# Patient Record
Sex: MALE | Race: WHITE | NOT HISPANIC OR LATINO | Employment: FULL TIME | URBAN - METROPOLITAN AREA
[De-identification: names, ages, dates, MRNs, and addresses within clinical notes are randomized per-mention and may not be internally consistent; named-entity substitution may affect disease eponyms.]

---

## 2017-05-27 DIAGNOSIS — E78.5 HYPERLIPIDEMIA LDL GOAL <160: ICD-10-CM

## 2017-05-30 RX ORDER — ATORVASTATIN CALCIUM 10 MG/1
TABLET, FILM COATED ORAL
Refills: 0
Start: 2017-05-30

## 2017-05-30 NOTE — TELEPHONE ENCOUNTER
Pending Prescriptions:                       Disp   Refills    atorvastatin (LIPITOR) 10 MG tablet [Pharm*90 tab*0        Sig: TAKE 1 TABLET BY MOUTH DAILY - NEED APPT           Last Written Prescription Date: 11/10/2016  Last Fill Quantity: 90, # refills: 3  Last Office Visit with [facility], [facility] or [facility] prescribing provider: 11/10/2016       Lab Results   Component Value Date    CHOL 182 06/17/2016     Lab Results   Component Value Date    HDL 29 06/17/2016     Lab Results   Component Value Date     06/17/2016     Lab Results   Component Value Date    TRIG 171 06/17/2016     Lab Results   Component Value Date    CHOLHDLRATIO 7.3 09/21/2015

## 2017-07-18 DIAGNOSIS — Z11.59 NEED FOR HEPATITIS C SCREENING TEST: ICD-10-CM

## 2017-07-18 DIAGNOSIS — G47.33 OBSTRUCTIVE SLEEP APNEA: ICD-10-CM

## 2017-07-18 DIAGNOSIS — E78.5 HYPERLIPIDEMIA LDL GOAL <160: Primary | ICD-10-CM

## 2017-07-18 DIAGNOSIS — Z12.5 SCREENING FOR PROSTATE CANCER: ICD-10-CM

## 2017-07-18 PROCEDURE — 86803 HEPATITIS C AB TEST: CPT | Performed by: FAMILY MEDICINE

## 2017-07-18 PROCEDURE — 36415 COLL VENOUS BLD VENIPUNCTURE: CPT | Performed by: FAMILY MEDICINE

## 2017-07-18 PROCEDURE — 80053 COMPREHEN METABOLIC PANEL: CPT | Performed by: FAMILY MEDICINE

## 2017-07-18 PROCEDURE — 85027 COMPLETE CBC AUTOMATED: CPT | Performed by: FAMILY MEDICINE

## 2017-07-18 PROCEDURE — 80061 LIPID PANEL: CPT | Performed by: FAMILY MEDICINE

## 2017-07-18 PROCEDURE — 84153 ASSAY OF PSA TOTAL: CPT | Performed by: FAMILY MEDICINE

## 2017-07-18 NOTE — LETTER
[place] [address] [facility]  [address]  [place]  [address]  Tel: [phone]    July 20, 2017    Syed GALICIA Xander  [address]  [place] [place] [facility] [address]        Dear Ronit Xander,    I wanted to get back to you with your test results.  I have enclosed a copy for your review.       I am happy to report that your CBC or complete blood count is normal with no signs of anemia, leukemia or platelet abnormalities. Your chemistry panel shows no signs of diabetes.  Your blood salts, kidney tests, liver tests, and proteins are all fine.     Your total cholesterol is 186 with the normal range being below 200.  Your HDL or good cholesterol is 37 with the normal range being above 40.  Your LDL or bad cholesterol is 122 with the normal range being below 130.       Your PSA (prostate cancer screening test) was normal.     Your screening test for hepatitis C was negative.     Juhi HUTCHINSON-C /NB    Results for orders placed or performed in visit on 07/18/17   **Hepatitis C Screen Reflex to RNA FUTURE anytime   Result Value Ref Range    Hepatitis C Antibody  NR     Nonreactive   Assay performance characteristics have not been established for newborns,   infants, and children     Comprehensive metabolic panel (BMP + Alb, Alk Phos, ALT, AST, Total. Bili, TP)   Result Value Ref Range    Sodium 140 133 - 144 mmol/L    Potassium 4.3 3.4 - 5.3 mmol/L    Chloride 105 94 - 109 mmol/L    Carbon Dioxide 30 20 - 32 mmol/L    Anion Gap 5 3 - 14 mmol/L    Glucose 90 70 - 99 mg/dL    Urea Nitrogen 19 7 - 30 mg/dL    Creatinine 1.17 0.66 - 1.25 mg/dL    GFR Estimate 65 >60 mL/min/1.7m2    GFR Estimate If Black 79 >60 mL/min/1.7m2    Calcium 9.0 8.5 - 10.1 mg/dL    Bilirubin Total 0.9 0.2 - 1.3 mg/dL    Albumin 4.0 3.4 - 5.0 g/dL    Protein Total 7.2 6.8 - 8.8 g/dL    Alkaline Phosphatase 71 40 - 150 U/L    ALT 46 0 - 70 U/L    AST 15 0 - 45 U/L   Lipid Profile (Chol, Trig, HDL, LDL calc)   Result Value Ref Range    Cholesterol  186 <200 mg/dL    Triglycerides 134 <150 mg/dL    HDL Cholesterol 37 (L) >39 mg/dL    LDL Cholesterol Calculated 122 (H) <100 mg/dL    Non HDL Cholesterol 149 (H) <130 mg/dL   PSA, tumor marker   Result Value Ref Range    PSA 0.85 0 - 4 ug/L   CBC with platelets   Result Value Ref Range    WBC 5.4 4.0 - 11.0 10e9/L    RBC Count 4.61 4.4 - 5.9 10e12/L    Hemoglobin 14.7 13.3 - 17.7 g/dL    Hematocrit 42.1 40.0 - 53.0 %    MCV 91 78 - 100 fl    MCH 31.9 26.5 - 33.0 pg    MCHC 34.9 31.5 - 36.5 g/dL    RDW 12.4 10.0 - 15.0 %    Platelet Count 235 150 - 450 10e9/L

## 2017-07-19 LAB
ALBUMIN SERPL-MCNC: 4 G/DL (ref 3.4–5)
ALP SERPL-CCNC: 71 U/L (ref 40–150)
ALT SERPL W P-5'-P-CCNC: 46 U/L (ref 0–70)
ANION GAP SERPL CALCULATED.3IONS-SCNC: 5 MMOL/L (ref 3–14)
AST SERPL W P-5'-P-CCNC: 15 U/L (ref 0–45)
BILIRUB SERPL-MCNC: 0.9 MG/DL (ref 0.2–1.3)
BUN SERPL-MCNC: 19 MG/DL (ref 7–30)
CALCIUM SERPL-MCNC: 9 MG/DL (ref 8.5–10.1)
CHLORIDE SERPL-SCNC: 105 MMOL/L (ref 94–109)
CHOLEST SERPL-MCNC: 186 MG/DL
CO2 SERPL-SCNC: 30 MMOL/L (ref 20–32)
CREAT SERPL-MCNC: 1.17 MG/DL (ref 0.66–1.25)
ERYTHROCYTE [DISTWIDTH] IN BLOOD BY AUTOMATED COUNT: 12.4 % (ref 10–15)
GFR SERPL CREATININE-BSD FRML MDRD: 65 ML/MIN/1.7M2
GLUCOSE SERPL-MCNC: 90 MG/DL (ref 70–99)
HCT VFR BLD AUTO: 42.1 % (ref 40–53)
HDLC SERPL-MCNC: 37 MG/DL
HGB BLD-MCNC: 14.7 G/DL (ref 13.3–17.7)
LDLC SERPL CALC-MCNC: 122 MG/DL
MCH RBC QN AUTO: 31.9 PG (ref 26.5–33)
MCHC RBC AUTO-ENTMCNC: 34.9 G/DL (ref 31.5–36.5)
MCV RBC AUTO: 91 FL (ref 78–100)
NONHDLC SERPL-MCNC: 149 MG/DL
PLATELET # BLD AUTO: 235 10E9/L (ref 150–450)
POTASSIUM SERPL-SCNC: 4.3 MMOL/L (ref 3.4–5.3)
PROT SERPL-MCNC: 7.2 G/DL (ref 6.8–8.8)
PSA SERPL-MCNC: 0.85 UG/L (ref 0–4)
RBC # BLD AUTO: 4.61 10E12/L (ref 4.4–5.9)
SODIUM SERPL-SCNC: 140 MMOL/L (ref 133–144)
TRIGL SERPL-MCNC: 134 MG/DL
WBC # BLD AUTO: 5.4 10E9/L (ref 4–11)

## 2017-07-20 LAB — HCV AB SERPL QL IA: NORMAL

## 2017-07-20 NOTE — PROGRESS NOTES
I wanted to get back to you with your test results.  I have enclosed a copy for your review.      I am happy to report that your CBC or complete blood count is normal with no signs of anemia, leukemia or platelet abnormalities. Your chemistry panel shows no signs of diabetes.  Your blood salts, kidney tests, liver tests, and proteins are all fine.    Your total cholesterol is 186 with the normal range being below 200.  Your HDL or good cholesterol is 37 with the normal range being above 40.  Your LDL or bad cholesterol is 122 with the normal range being below 130.      Your PSA (prostate cancer screening test) was normal.    Your screening test for hepatitis C was negative.    Juhi Skelton PA-C

## 2017-09-07 DIAGNOSIS — F33.1 MAJOR DEPRESSIVE DISORDER, RECURRENT EPISODE, MODERATE (H): ICD-10-CM

## 2017-09-08 RX ORDER — BUPROPION HYDROCHLORIDE 300 MG/1
TABLET ORAL
Qty: 90 TABLET | Refills: 0 | Status: SHIPPED | OUTPATIENT
Start: 2017-09-08 | End: 2018-01-04

## 2017-09-08 NOTE — TELEPHONE ENCOUNTER
Prescription approved per [facility] Refill Protocol.  Emely Cano RN

## 2017-09-08 NOTE — TELEPHONE ENCOUNTER
Pending Prescriptions:                       Disp   Refills    buPROPion (WELLBUTRIN XL) 300 MG 24 hr ta*90 tab*1            Sig: TAKE 1 TABLET (300 MG) BY MOUTH EVERY MORNING           Last Written Prescription Date: 11/10/16  Last Fill Quantity: 90; # refills: 1  Last Office Visit with [facility], [facility] or [facility] prescribing provider:  11/10/16 Nafisa        Last PHQ-9 score on record=   PHQ-9 SCORE 11/10/2016   Total Score -   Total Score 3       Lab Results   Component Value Date    AST 15 07/18/2017     Lab Results   Component Value Date    ALT 46 07/18/2017     Anabell Denney, RT(R)

## 2017-12-14 DIAGNOSIS — F33.1 MAJOR DEPRESSIVE DISORDER, RECURRENT EPISODE, MODERATE (H): ICD-10-CM

## 2017-12-18 RX ORDER — BUPROPION HYDROCHLORIDE 300 MG/1
TABLET ORAL
Qty: 30 TABLET | Refills: 0 | Status: SHIPPED | OUTPATIENT
Start: 2017-12-18 | End: 2018-01-04

## 2017-12-18 NOTE — TELEPHONE ENCOUNTER
Medication is being filled for 1 time refill only due to:  Patient needs to be seen because it has been more than one year since last visit.   Sadia POP RN    Requested Prescriptions   Pending Prescriptions Disp Refills     buPROPion (WELLBUTRIN XL) 300 MG 24 hr tablet [Pharmacy Med Name: BUPROPION HCL  MG TABLET] 90 tablet 1     Sig: TAKE 1 TABLET (300 MG) BY MOUTH EVERY MORNING    SSRIs Protocol Failed    12/14/2017  5:37 PM       Failed - PHQ-9 score less than 5 in past 6 months    Please review PHQ-9 score.          Failed - Medication is NOT Bupropion    If the medication is Bupropion (Wellbutrin), and the patient is taking for smoking cessation; OK to refill.         Failed - Recent (6 mo) or future visit with authorizing provider's specialty    Patient had office visit in the last 6 months or has a visit in the next 30 days with authorizing provider.  See chart review.            Passed - Patient is age 18 or older

## 2018-01-04 ENCOUNTER — OFFICE VISIT (OUTPATIENT)
Dept: FAMILY MEDICINE | Facility: CLINIC | Age: 53
End: 2018-01-04
Payer: COMMERCIAL

## 2018-01-04 VITALS
WEIGHT: 237 LBS | HEART RATE: 56 BPM | OXYGEN SATURATION: 96 % | RESPIRATION RATE: 16 BRPM | HEIGHT: 73 IN | TEMPERATURE: 97.6 F | DIASTOLIC BLOOD PRESSURE: 82 MMHG | BODY MASS INDEX: 31.41 KG/M2 | SYSTOLIC BLOOD PRESSURE: 132 MMHG

## 2018-01-04 DIAGNOSIS — F33.1 MAJOR DEPRESSIVE DISORDER, RECURRENT EPISODE, MODERATE (H): ICD-10-CM

## 2018-01-04 DIAGNOSIS — E78.5 HYPERLIPIDEMIA LDL GOAL <160: ICD-10-CM

## 2018-01-04 DIAGNOSIS — J30.0 CHRONIC VASOMOTOR RHINITIS: ICD-10-CM

## 2018-01-04 DIAGNOSIS — G47.33 OSA ON CPAP: ICD-10-CM

## 2018-01-04 DIAGNOSIS — Z00.00 ENCOUNTER FOR ROUTINE ADULT HEALTH EXAMINATION WITHOUT ABNORMAL FINDINGS: Primary | ICD-10-CM

## 2018-01-04 PROCEDURE — 80053 COMPREHEN METABOLIC PANEL: CPT | Performed by: PHYSICIAN ASSISTANT

## 2018-01-04 PROCEDURE — 99396 PREV VISIT EST AGE 40-64: CPT | Performed by: PHYSICIAN ASSISTANT

## 2018-01-04 PROCEDURE — 80061 LIPID PANEL: CPT | Performed by: PHYSICIAN ASSISTANT

## 2018-01-04 PROCEDURE — 36415 COLL VENOUS BLD VENIPUNCTURE: CPT | Performed by: PHYSICIAN ASSISTANT

## 2018-01-04 RX ORDER — IPRATROPIUM BROMIDE 21 UG/1
2 SPRAY, METERED NASAL EVERY 12 HOURS
Qty: 1 BOX | Refills: 11 | Status: SHIPPED | OUTPATIENT
Start: 2018-01-04 | End: 2019-02-05

## 2018-01-04 RX ORDER — BUPROPION HYDROCHLORIDE 300 MG/1
TABLET ORAL
Qty: 90 TABLET | Refills: 3 | Status: SHIPPED | OUTPATIENT
Start: 2018-01-04 | End: 2019-01-28

## 2018-01-04 RX ORDER — ATORVASTATIN CALCIUM 10 MG/1
TABLET, FILM COATED ORAL
Qty: 90 TABLET | Refills: 3 | Status: SHIPPED | OUTPATIENT
Start: 2018-01-04 | End: 2019-01-28

## 2018-01-04 ASSESSMENT — PATIENT HEALTH QUESTIONNAIRE - PHQ9: SUM OF ALL RESPONSES TO PHQ QUESTIONS 1-9: 4

## 2018-01-04 NOTE — PATIENT INSTRUCTIONS
Preventive Health Recommendations  Male Ages 50 - 64    Yearly exam:             See your health care provider every year in order to  o   Review health changes.   o   Discuss preventive care.    o   Review your medicines if your doctor has prescribed any.     Have a cholesterol test every 5 years, or more frequently if you are at risk for high cholesterol/heart disease.     Have a diabetes test (fasting glucose) every three years. If you are at risk for diabetes, you should have this test more often.     Have a colonoscopy at age 50, or have a yearly FIT test (stool test). These exams will check for colon cancer.      Talk with your health care provider about whether or not a prostate cancer screening test (PSA) is right for you.    You should be tested each year for STDs (sexually transmitted diseases), if you re at risk.     Shots: Get a flu shot each year. Get a tetanus shot every 10 years.     Nutrition:    Eat at least 5 servings of fruits and vegetables daily.     Eat whole-grain bread, whole-wheat pasta and brown rice instead of white grains and rice.     Talk to your provider about Calcium and Vitamin D.     Lifestyle    Exercise for at least 150 minutes a week (30 minutes a day, 5 days a week). This will help you control your weight and prevent disease.     Limit alcohol to one drink per day.     No smoking.     Wear sunscreen to prevent skin cancer.     See your dentist every six months for an exam and cleaning.     See your eye doctor every 1 to 2 years.      For the runny nose:   Your could try an allergy pill such as Allegra 180mg or claritin 10mg or Zyrtec 10mg  If that doesn't work, try the prescription nasal spray Atrovent  Flonase and Nasacort are steroid nasal sprays you can buy over the counter    Start the lamisil for the toenail

## 2018-01-04 NOTE — MR AVS SNAPSHOT
After Visit Summary   1/4/2018    Syed Terrell    MRN: [id number]           Patient Information     Date Of Birth          1965        Visit Information        Provider Department      1/4/2018 9:30 AM Juhi Skelton PA-C [facility]        Today's Diagnoses     Encounter for routine adult health examination without abnormal findings    -  1    Major depressive disorder, recurrent episode, moderate (H)        Chronic vasomotor rhinitis        Hyperlipidemia LDL goal <160        VANESSA on CPAP          Care Instructions      Preventive Health Recommendations  Male Ages 50 - 64    Yearly exam:             See your health care provider every year in order to  o   Review health changes.   o   Discuss preventive care.    o   Review your medicines if your doctor has prescribed any.     Have a cholesterol test every 5 years, or more frequently if you are at risk for high cholesterol/heart disease.     Have a diabetes test (fasting glucose) every three years. If you are at risk for diabetes, you should have this test more often.     Have a colonoscopy at age 50, or have a yearly FIT test (stool test). These exams will check for colon cancer.      Talk with your health care provider about whether or not a prostate cancer screening test (PSA) is right for you.    You should be tested each year for STDs (sexually transmitted diseases), if you re at risk.     Shots: Get a flu shot each year. Get a tetanus shot every 10 years.     Nutrition:    Eat at least 5 servings of fruits and vegetables daily.     Eat whole-grain bread, whole-wheat pasta and brown rice instead of white grains and rice.     Talk to your provider about Calcium and Vitamin D.     Lifestyle    Exercise for at least 150 minutes a week (30 minutes a day, 5 days a week). This will help you control your weight and prevent disease.     Limit alcohol to one drink per day.     No smoking.     Wear sunscreen to prevent skin cancer.  "    See your dentist every six months for an exam and cleaning.     See your eye doctor every 1 to 2 years.      For the runny nose:   Your could try an allergy pill such as Allegra 180mg or claritin 10mg or Zyrtec 10mg  If that doesn't work, try the prescription nasal spray Atrovent  Flonase and Nasacort are steroid nasal sprays you can buy over the counter    Start the lamisil for the toenail              Follow-ups after your visit        Who to contact     If you have questions or need follow up information about today's clinic visit or your schedule please contact [facility] directly at [phone].  Normal or non-critical lab and imaging results will be communicated to you by MyChart, letter or phone within 4 business days after the clinic has received the results. If you do not hear from us within 7 days, please contact the clinic through MyChart or phone. If you have a critical or abnormal lab result, we will notify you by phone as soon as possible.  Submit refill requests through [employer] or call your pharmacy and they will forward the refill request to us. Please allow 3 business days for your refill to be completed.          Additional Information About Your Visit        MyChart Information     [employer] lets you send messages to your doctor, view your test results, renew your prescriptions, schedule appointments and more. To sign up, go to [email or web address] . Click on \"Log in\" on the left side of the screen, which will take you to the Welcome page. Then click on \"Sign up Now\" on the right side of the page.     You will be asked to enter the access code listed below, as well as some personal information. Please follow the directions to create your username and password.     Your access code is: [email or web address]  Expires: 2018  9:33 AM     Your access code will  in 90 days. If you need help or a new code, please call your [facility] clinic or [phone].        Care EveryWhere ID     " "This is your Care EveryWhere ID. This could be used by other organizations to access your [facility] medical records  [id number]        Your Vitals Were     Pulse Temperature Respirations Height Pulse Oximetry BMI (Body Mass Index)    56 97.6  F (36.4  C) (Oral) 16 6' 1\" (1.854 m) 96% 31.27 kg/m2       Blood Pressure from Last 3 Encounters:   01/04/18 132/82   11/10/16 118/70   12/17/15 136/80    Weight from Last 3 Encounters:   01/04/18 237 lb (107.5 kg)   11/10/16 240 lb 12.8 oz (109.2 kg)   12/17/15 232 lb 12.8 oz (105.6 kg)              We Performed the Following     Comprehensive metabolic panel     Lipid Profile          Today's Medication Changes          These changes are accurate as of: 1/4/18  9:33 AM.  If you have any questions, ask your nurse or doctor.               Start taking these medicines.        Dose/Directions    ipratropium 0.03 % spray   Commonly known as:  ATROVENT   Used for:  Chronic vasomotor rhinitis   Started by:  Juhi Skelton PA-C        Dose:  2 spray   Spray 2 sprays into both nostrils every 12 hours   Quantity:  1 Box   Refills:  11         These medicines have changed or have updated prescriptions.        Dose/Directions    buPROPion 300 MG 24 hr tablet   Commonly known as:  WELLBUTRIN XL   This may have changed:  See the new instructions.   Used for:  Major depressive disorder, recurrent episode, moderate (H)   Changed by:  Juhi Skelton PA-C        TAKE 1 TABLET (300 MG) BY MOUTH EVERY MORNING   Quantity:  90 tablet   Refills:  3         Stop taking these medicines if you haven't already. Please contact your care team if you have questions.     terbinafine 250 MG tablet   Commonly known as:  lamISIL   Stopped by:  Juhi Skelton PA-C           triamcinolone 0.1 % cream   Commonly known as:  KENALOG   Stopped by:  Juhi Skelton PA-C                Where to get your medicines      These medications were sent to [facility]/pharmacy #[id number] - [place], [place] - [address] [place] AT [facility]  [address] [place] [address] [address] [facility] [address]     Phone:  [phone]     atorvastatin 10 MG tablet    buPROPion 300 MG 24 hr tablet    ipratropium 0.03 % spray                Primary Care Provider Office Phone # Fax #    Juhi Skelton PA-C [phone] [address] [facility] [address]  [facility] [address]        Equal Access to Services     ROSENDA SCALES : Hadii aad ku hadasho Soomaali, waaxda luqadaha, qaybta kaalmada adeegyada, waxay idiin hayaan adeeg kharash la'jairo . So [facility] [phone].    ATENCIÓN: Si habla español, tiene a hardin disposición servicios gratuitos de asistencia lingüística. [facility] [phone].    We comply with applicable federal civil rights laws and [place] laws. We do not discriminate on the basis of race, color, national origin, age, disability, sex, sexual orientation, or gender identity.            Thank you!     Thank you for choosing [facility]  for your care. Our goal is always to provide you with excellent care. Hearing back from our patients is one way we can continue to improve our services. Please take a few minutes to complete the written survey that you may receive in the mail after your visit with us. Thank you!             Your Updated Medication List - Protect others around you: Learn how to safely use, store and throw away your medicines at www.disposemymeds.org.          This list is accurate as of: 1/4/18  9:33 AM.  Always use your most recent med list.                   Brand Name Dispense Instructions for use Diagnosis    atorvastatin 10 MG tablet    LIPITOR    90 tablet    TAKE 1 TABLET BY MOUTH DAILY    Hyperlipidemia LDL goal <160       buPROPion 300 MG 24 hr tablet    WELLBUTRIN XL    90 tablet    TAKE 1 TABLET (300 MG) BY MOUTH EVERY MORNING    Major depressive disorder, recurrent episode, moderate (H)       ipratropium 0.03 % spray    ATROVENT    1 Box    Spray 2 sprays into both nostrils every 12  hours    Chronic vasomotor rhinitis       vitamin D 2000 UNITS Caps      Take  by mouth.

## 2018-01-04 NOTE — NURSING NOTE
"Chief Complaint   Patient presents with     Physical     pt is fasting.        Initial /82  Pulse 56  Temp 97.6  F (36.4  C) (Oral)  Resp 16  Ht 6' 1\" (1.854 m)  Wt 237 lb (107.5 kg)  SpO2 96%  BMI 31.27 kg/m2 Estimated body mass index is 31.27 kg/(m^2) as calculated from the following:    Height as of this encounter: 6' 1\" (1.854 m).    Weight as of this encounter: 237 lb (107.5 kg).  Medication Reconciliation: complete  "

## 2018-01-04 NOTE — PROGRESS NOTES
SUBJECTIVE:   CC: Syed Terrell is an 52 year old male who presents for preventative health visit.     He has a constant runny nose and some post nasal drip for a number of weeks  Had some sneezing at times    He was off of wellbutrin for a few weeks and felt run down so back on this.   Has helped his mood    He never used his lamisil for toenail fungus and now this nail is thickened and painful    Wife Lb had gastric sleeve surgery and has been losing wt and exercising.  He has not been exercising.      Healthy Habits:    Do you get at least three servings of calcium containing foods daily (dairy, green leafy vegetables, etc.)? yes    Amount of exercise or daily activities, outside of work: 0 day(s) per week    Problems taking medications regularly No    Medication side effects: No    Have you had an eye exam in the past two years? yes    Do you see a dentist twice per year? yes    Do you have sleep apnea, excessive snoring or daytime drowsiness?yes, has cpap and also has daytime drowsiness         Toe fungus problem on left foot. Painful  Allergies? Sinus drainage    Today's PHQ-2 Score:   PHQ-2 ( 1999 Pfizer) 1/4/2018 11/10/2016   Q1: Little interest or pleasure in doing things 0 0   Q2: Feeling down, depressed or hopeless 0 0   PHQ-2 Score 0 0       Abuse: Current or Past(Physical, Sexual or Emotional)- No  Do you feel safe in your environment - Yes    Social History   Substance Use Topics     Smoking status: Never Smoker     Smokeless tobacco: Never Used     Alcohol use 0.0 - 0.6 oz/week     0 - 1 Standard drinks or equivalent per week      Comment: 0-1 drinks per week      If you drink alcohol do you typically have >3 drinks per day or >7 drinks per week? No                      Last PSA:   PSA   Date Value Ref Range Status   07/18/2017 0.85 0 - 4 ug/L Final     Comment:     Assay Method:  Chemiluminescence using Siemens Vista analyzer     Past Medical History:   Diagnosis Date     Colon polyp 2010     "Repeat colonoscopy 2015     FH: colon cancer      Hyperlipidemia LDL goal <160      Moderate major depression (H) 6/12/2013     VANESSA on CPAP     could not tolerate CPAP     History reviewed. No pertinent surgical history.  Current Outpatient Prescriptions   Medication Sig Dispense Refill     buPROPion (WELLBUTRIN XL) 300 MG 24 hr tablet TAKE 1 TABLET (300 MG) BY MOUTH EVERY MORNING 90 tablet 3     ipratropium (ATROVENT) 0.03 % spray Spray 2 sprays into both nostrils every 12 hours 1 Box 11     atorvastatin (LIPITOR) 10 MG tablet TAKE 1 TABLET BY MOUTH DAILY 90 tablet 3     Cholecalciferol (VITAMIN D) 2000 UNITS CAPS Take  by mouth.       [DISCONTINUED] buPROPion (WELLBUTRIN XL) 300 MG 24 hr tablet TAKE 1 TABLET (300 MG) BY MOUTH EVERY MORNING 30 tablet 0     [DISCONTINUED] buPROPion (WELLBUTRIN XL) 300 MG 24 hr tablet TAKE 1 TABLET (300 MG) BY MOUTH EVERY MORNING 90 tablet 0     [DISCONTINUED] atorvastatin (LIPITOR) 10 MG tablet TAKE 1 TABLET BY MOUTH DAILY 90 tablet 3       ROS:  C: NEGATIVE for fever, chills, change in weight  I: NEGATIVE for worrisome rashes, moles or lesions  E: NEGATIVE for vision changes or irritation  ENT: NEGATIVE for ear, mouth and throat problems  R: NEGATIVE for significant cough or SOB  CV: NEGATIVE for chest pain, palpitations or peripheral edema  GI: NEGATIVE for nausea, abdominal pain, heartburn, or change in bowel habits   male: negative for dysuria, hematuria, decreased urinary stream, erectile dysfunction, urethral discharge  M: NEGATIVE for significant arthralgias or myalgia  N: NEGATIVE for weakness, dizziness or paresthesias  P: NEGATIVE for changes in mood or affect    OBJECTIVE:   /82  Pulse 56  Temp 97.6  F (36.4  C) (Oral)  Resp 16  Ht 6' 1\" (1.854 m)  Wt 237 lb (107.5 kg)  SpO2 96%  BMI 31.27 kg/m2  EXAM:  GENERAL: healthy, alert and no distress  EYES: Eyes grossly normal to inspection, PERRL and conjunctivae and sclerae normal  HENT: ear canals and TM's " "normal, nose and mouth without ulcers or lesions  NECK: no adenopathy, no asymmetry, masses, or scars and thyroid normal to palpation  RESP: lungs clear to auscultation - no rales, rhonchi or wheezes  CV: regular rate and rhythm, normal S1 S2, no S3 or S4, no murmur, click or rub, no peripheral edema and peripheral pulses strong  ABDOMEN: soft, nontender, no hepatosplenomegaly, no masses and bowel sounds normal  Rectal: prostate is smooth without masses  MS: no gross musculoskeletal defects noted, no edema  SKIN: no suspicious lesions or rashes  NEURO: Normal strength and tone, mentation intact and speech normal  PSYCH: mentation appears normal, affect normal/bright    ASSESSMENT/PLAN:   Assessment and Plan:     (Z00.00) Encounter for routine adult health examination without abnormal findings  (primary encounter diagnosis)  Comment: colonoscopy is up to date  Plan: Comprehensive metabolic panel, PSA and CBC done in July so not repeated today. Pt has needle phobia so drawn in room.  Declines flu shot            (F33.1) Major depressive disorder, recurrent episode, moderate (H)  Comment:   Plan: buPROPion (WELLBUTRIN XL) 300 MG 24 hr tablet        refilled    (J30.0) Chronic vasomotor rhinitis  Comment: he'd like to try an oral antihistamine first, but then if needed could try Atrovent or flonase  Plan: ipratropium (ATROVENT) 0.03 % spray            (E78.5) Hyperlipidemia LDL goal <160  Comment:   Plan: atorvastatin (LIPITOR) 10 MG tablet, Lipid         Profile            (G47.33,  Z99.89) VANESSA on CPAP  Comment:   Plan:         COUNSELING:  Reviewed preventive health counseling, as reflected in patient instructions       reports that he has never smoked. He has never used smokeless tobacco.      Estimated body mass index is 31.27 kg/(m^2) as calculated from the following:    Height as of this encounter: 6' 1\" (1.854 m).    Weight as of this encounter: 237 lb (107.5 kg).       Counseling Resources:  ATP IV " Guidelines  Pooled Cohorts Equation Calculator  FRAX Risk Assessment  ICSI Preventive Guidelines  Dietary Guidelines for Americans, 2010  USDA's MyPlate  ASA Prophylaxis  Lung CA Screening    Juhi Skelton PA-C  [facility]

## 2018-01-04 NOTE — LETTER
[place] [address] [facility]  [address]  [place]  [address]  Tel: [phone]    January 5, 2018    Syed Terrell  [address]  [place] [address]        Dear Mr. Terrell,    It was a pleasure seeing you for your physical examination.  I wanted to get back to you with your test results.  I have enclosed a copy for your review.      I am happy to report that your chemistry panel shows no signs of diabetes.  Your blood salts, kidney tests, liver tests, and proteins are all fine.    Your total cholesterol is 191 with the normal range being below 200.  Your HDL or good cholesterol is 38 with the normal range being above 40.  Your LDL or bad cholesterol is 117 with the normal range being below 130.  Looks good.  Exercise can bring up the HDL.    Have a great 2018!    If you have any further questions or problems, please contact our office.      Sincerely,    Juhi Skelton PA-C / katty          Enclosure: Lab Results    Resulted Orders   Comprehensive metabolic panel   Result Value Ref Range    Sodium 143 133 - 144 mmol/L    Potassium 4.2 3.4 - 5.3 mmol/L    Chloride 106 94 - 109 mmol/L    Carbon Dioxide 32 20 - 32 mmol/L    Anion Gap 5 3 - 14 mmol/L    Glucose 84 70 - 99 mg/dL    Urea Nitrogen 16 7 - 30 mg/dL    Creatinine 1.01 0.66 - 1.25 mg/dL    GFR Estimate 77 >60 mL/min/1.7m2      Comment:      Non  GFR Calc    GFR Estimate If Black >90 >60 mL/min/1.7m2      Comment:       GFR Calc    Calcium 9.0 8.5 - 10.1 mg/dL    Bilirubin Total 0.7 0.2 - 1.3 mg/dL    Albumin 4.3 3.4 - 5.0 g/dL    Protein Total 7.2 6.8 - 8.8 g/dL    Alkaline Phosphatase 68 40 - 150 U/L    ALT 68 0 - 70 U/L    AST 33 0 - 45 U/L   Lipid Profile   Result Value Ref Range    Cholesterol 191 <200 mg/dL    Triglycerides 180 (H) <150 mg/dL      Comment:      Borderline high:  150-199 mg/dl  High:             200-499 mg/dl  Very high:       >499 mg/dl      HDL Cholesterol 38 (L) >39 mg/dL    LDL  Cholesterol Calculated 117 (H) <100 mg/dL      Comment:      Above desirable:  100-129 mg/dl  Borderline High:  130-159 mg/dL  High:             160-189 mg/dL  Very high:       >189 mg/dl      Non HDL Cholesterol 153 (H) <130 mg/dL      Comment:      Above Desirable:  130-159 mg/dl  Borderline high:  160-189 mg/dl  High:             190-219 mg/dl  Very high:       >219 mg/dl

## 2018-01-05 LAB
ALBUMIN SERPL-MCNC: 4.3 G/DL (ref 3.4–5)
ALP SERPL-CCNC: 68 U/L (ref 40–150)
ALT SERPL W P-5'-P-CCNC: 68 U/L (ref 0–70)
ANION GAP SERPL CALCULATED.3IONS-SCNC: 5 MMOL/L (ref 3–14)
AST SERPL W P-5'-P-CCNC: 33 U/L (ref 0–45)
BILIRUB SERPL-MCNC: 0.7 MG/DL (ref 0.2–1.3)
BUN SERPL-MCNC: 16 MG/DL (ref 7–30)
CALCIUM SERPL-MCNC: 9 MG/DL (ref 8.5–10.1)
CHLORIDE SERPL-SCNC: 106 MMOL/L (ref 94–109)
CHOLEST SERPL-MCNC: 191 MG/DL
CO2 SERPL-SCNC: 32 MMOL/L (ref 20–32)
CREAT SERPL-MCNC: 1.01 MG/DL (ref 0.66–1.25)
GFR SERPL CREATININE-BSD FRML MDRD: 77 ML/MIN/1.7M2
GLUCOSE SERPL-MCNC: 84 MG/DL (ref 70–99)
HDLC SERPL-MCNC: 38 MG/DL
LDLC SERPL CALC-MCNC: 117 MG/DL
NONHDLC SERPL-MCNC: 153 MG/DL
POTASSIUM SERPL-SCNC: 4.2 MMOL/L (ref 3.4–5.3)
PROT SERPL-MCNC: 7.2 G/DL (ref 6.8–8.8)
SODIUM SERPL-SCNC: 143 MMOL/L (ref 133–144)
TRIGL SERPL-MCNC: 180 MG/DL

## 2018-01-05 NOTE — PROGRESS NOTES
It was a pleasure seeing you for your physical examination.  I wanted to get back to you with your test results.  I have enclosed a copy for your review.      I am happy to report that your chemistry panel shows no signs of diabetes.  Your blood salts, kidney tests, liver tests, and proteins are all fine.    Your total cholesterol is 191 with the normal range being below 200.  Your HDL or good cholesterol is 38 with the normal range being above 40.  Your LDL or bad cholesterol is 117 with the normal range being below 130.  Looks good.  Exercise can bring up the HDL.    Have a great 2018!    Juhi Skelton PA-C

## 2018-02-02 DIAGNOSIS — F33.1 MAJOR DEPRESSIVE DISORDER, RECURRENT EPISODE, MODERATE (H): ICD-10-CM

## 2018-02-03 NOTE — TELEPHONE ENCOUNTER
"Requested Prescriptions   Pending Prescriptions Disp Refills     buPROPion (WELLBUTRIN XL) 300 MG 24 hr tablet [Pharmacy Med Name: BUPROPION HCL  MG TABLET] 30 tablet 0     Sig: TAKE 1 TABLET BY MOUTH EVERY MORNING    SSRIs Protocol Passed    2/2/2018  5:51 PM       Passed - PHQ-9 score less than 5 in past 6 months    The PHQ-9 criteria is meant to fail. It requires a PHQ-9 score review         Passed - Medication is Bupropion    If the medication is Bupropion (Wellbutrin), and the patient is taking for smoking cessation; OK to refill.         Passed - Patient is age 18 or older       Passed - Recent (6 mo) or future visit with authorizing provider's specialty    Patient had office visit in the last 6 months or has a visit in the next 30 days with authorizing provider.  See \"Patient Info\" tab in inbasket, or \"Choose Columns\" in Meds & Orders section of the refill encounter.            Last Written Prescription Date:  1/04/18  Last Fill Quantity: 90 tablet,  # refills: 3   Last Office Visit with [facility] provider:  1/04/18 Nafisa   Future Office Visit:           PHQ-9 SCORE 12/17/2015 11/10/2016 1/4/2018   Total Score - - -   Total Score 2 3 4       "

## 2018-02-05 ENCOUNTER — TELEPHONE (OUTPATIENT)
Dept: FAMILY MEDICINE | Facility: CLINIC | Age: 53
End: 2018-02-05

## 2018-02-05 DIAGNOSIS — F33.1 MAJOR DEPRESSIVE DISORDER, RECURRENT EPISODE, MODERATE (H): ICD-10-CM

## 2018-02-05 RX ORDER — BUPROPION HYDROCHLORIDE 300 MG/1
TABLET ORAL
Qty: 90 TABLET | Refills: 3 | Status: CANCELLED | OUTPATIENT
Start: 2018-02-05

## 2018-02-05 RX ORDER — BUPROPION HYDROCHLORIDE 300 MG/1
TABLET ORAL
Refills: 0
Start: 2018-02-05

## 2018-02-05 NOTE — TELEPHONE ENCOUNTER
Reason for Call: call back    Detailed comments: Pharmacy calling in stating that there is no bupropion on file in their system for refill or ever having been refilled. Please call to advise.     Phone Number Patient can be reached at: Other phone number: [phone]- Ani    Best Time: any    Can we leave a detailed message on this number? Not Applicable    Call taken on 2/5/2018 at 3:59 PM by Gladys Beckford

## 2018-03-06 ENCOUNTER — OFFICE VISIT (OUTPATIENT)
Dept: PODIATRY | Facility: CLINIC | Age: 53
End: 2018-03-06
Payer: COMMERCIAL

## 2018-03-06 VITALS
SYSTOLIC BLOOD PRESSURE: 128 MMHG | HEIGHT: 73 IN | HEART RATE: 68 BPM | BODY MASS INDEX: 32.74 KG/M2 | WEIGHT: 247 LBS | DIASTOLIC BLOOD PRESSURE: 79 MMHG

## 2018-03-06 DIAGNOSIS — B35.1 ONYCHOMYCOSIS: Primary | ICD-10-CM

## 2018-03-06 DIAGNOSIS — L60.0 ONYCHOCRYPTOSIS: ICD-10-CM

## 2018-03-06 PROCEDURE — 99203 OFFICE O/P NEW LOW 30 MIN: CPT | Mod: 25 | Performed by: PODIATRIST

## 2018-03-06 PROCEDURE — 11730 AVULSION NAIL PLATE SIMPLE 1: CPT | Mod: TA | Performed by: PODIATRIST

## 2018-03-06 RX ORDER — TERBINAFINE HYDROCHLORIDE 250 MG/1
250 TABLET ORAL DAILY
COMMUNITY
End: 2019-02-05

## 2018-03-06 NOTE — PROGRESS NOTES
PATIENT HISTORY:  Syed Terrell is a 52 year old male who presents to clinic for b/l 1st toenail issues, left more deformed and painful.  Pt is on oral Lamisil.  No other treatments.  Too early to know if this is helping.  Right 1st toenail with some sensitivity distally.  Pain can be worse in shoes overall. 0-5/10 pain.      Review of Systems:  Patient denies fever, chills, rash, wound, stiffness, limping, numbness, weakness, heart burn, blood in stool, chest pain with activity, calf pain when walking, shortness of breath with activity, chronic cough, easy bleeding/bruising, swelling of ankles, excessive thirst, fatigue, depression, anxiety.       PAST MEDICAL HISTORY:   Past Medical History:   Diagnosis Date     Colon polyp 2010    Repeat colonoscopy 2015     FH: colon cancer      Hyperlipidemia LDL goal <160      Moderate major depression (H) 6/12/2013     VANESSA on CPAP     could not tolerate CPAP        PAST SURGICAL HISTORY: No past surgical history on file.     MEDICATIONS:   Current Outpatient Prescriptions:      terbinafine (LAMISIL) 250 MG tablet, Take 250 mg by mouth daily, Disp: , Rfl:      buPROPion (WELLBUTRIN XL) 300 MG 24 hr tablet, TAKE 1 TABLET (300 MG) BY MOUTH EVERY MORNING, Disp: 90 tablet, Rfl: 3     ipratropium (ATROVENT) 0.03 % spray, Spray 2 sprays into both nostrils every 12 hours, Disp: 1 Box, Rfl: 11     atorvastatin (LIPITOR) 10 MG tablet, TAKE 1 TABLET BY MOUTH DAILY, Disp: 90 tablet, Rfl: 3     Cholecalciferol (VITAMIN D) 2000 UNITS CAPS, Take  by mouth., Disp: , Rfl:      ALLERGIES:    Allergies   Allergen Reactions     No Known Drug Allergy         SOCIAL HISTORY:   Social History     Social History     Marital status:      Spouse name: N/A     Number of children: N/A     Years of education: N/A     Occupational History     Not on file.     Social History Main Topics     Smoking status: Never Smoker     Smokeless tobacco: Never Used     Alcohol use 0.0 - 0.6 oz/week     0 - 1  "Standard drinks or equivalent per week      Comment: 0-1 drinks per week     Drug use: No     Sexual activity: Yes     Partners: Female     Other Topics Concern     Not on file     Social History Narrative    , 2 sons ages 21 and 17        FAMILY HISTORY:   Family History   Problem Relation Age of Onset     Cancer - colorectal Maternal Grandfather 70     GASTROINTESTINAL DISEASE Mother      colon polyps     C.A.D. Paternal Grandfather      MI age 65     Prostate Cancer Father 78        EXAM:Vitals: /79 (BP Location: Right arm, Patient Position: Chair, Cuff Size: Adult Large)  Pulse 68  Ht 6' 1\" (1.854 m)  Wt 247 lb (112 kg)  BMI 32.59 kg/m2  BMI= Body mass index is 32.59 kg/(m^2).    General appearance: Patient is alert and fully cooperative with history & exam.  No sign of distress is noted during the visit.     Psychiatric: Affect is pleasant & appropriate.  Patient appears motivated to improve health.     Respiratory: Breathing is regular & unlabored while sitting.     HEENT: Hearing is intact to spoken word.  Speech is clear.  No gross evidence of visual impairment that would impact ambulation.     Dermatologic: b/l hallux nails thickened, dystrophic, discolored.  Left considerably more thickened with central peaking, ingrowing borders.  Pain to pressure.  No paronychia or evidence of soft tissue infection is noted.     Vascular: DP & PT pulses are intact & regular bilaterally.  No significant edema or varicosities noted.  CFT and skin temperature are normal to both lower extremities.     Neurologic: Lower extremity sensation is intact to light touch.  No evidence of weakness or contracture in the lower extremities.  No evidence of neuropathy.     Musculoskeletal: Patient is ambulatory without assistive device or brace.  No gross ankle deformity noted.  No foot or ankle joint effusion is noted.     ASSESSMENT: b/l 1st toe onychomycosis, left onychocryptosis.     PLAN:  Reviewed patient's chart " in epic.  Discussed condition and treatment options including pros and cons.    Discussed causes of nail fungus and ingrowing nails.      Discussed fungus treatment options with patient and explained that there isn't one treatment that is 100% effective.  Debridement is an option.  Discussed oral lamisil which is the most effective but can have liver effects.  Discussed topical options, which are less effective.  Also discussed that if there was damage to the nail and the nail is now dystrophic that none of the above is going to change the nail.      Discussed nail removal as an option for fungus/ingrowing nail.  Given level of deformity of left hallux nail, I don't think partial removal would be adequate.    The patient elected to proceed with nonpermanent partial removal of the left hallux nail.  Discussed risk of painful dystrophic nail regrowth, infection, wound healing issues.      Procedure:  In addition to office visit.  After consent, the left 1st toe was anesthetized with 5cc's of 1% lidocaine plain after alcohol prep. Betadine prep performed.  A tourniquet was applied to the toe. Using sterile instrumentation, the nail was elevated from underlying skin and avulsed.  Bacitracin was applied to the nail bed.  The tourniquet was removed and a hyperemic response was noted.  Bandage was applied to the toe.  The patient tolerated the procedure and anesthesia well.    Post op instructions provided and discussed with pt.  F/u prn.      Mo Dalal DPM, FACFAS

## 2018-03-06 NOTE — MR AVS SNAPSHOT
After Visit Summary   3/6/2018    Syed Terrell    MRN: [id number]           Patient Information     Date Of Birth          1965        Visit Information        Provider Department      3/6/2018 4:30 PM Mo Mena DPM [facility]        Today's Diagnoses     Onychomycosis    -  1    Onychocryptosis          Care Instructions    Thank you for choosing [facility]!    Follow up as needed.    DR. MENA'S CLINIC LOCATIONS     MONDAY  [place] TUESDAY & FRIDAY AM  [place]   [address] [facility]   [address] [place] [address] [address]   [place] [address] [place] [address]   [phone]  [phone]  [phone]       WEDNESDAY  [place] SCHEDULE SURGERY: [phone]   [address] [place] [address] APPOINTMENTS: [phone]   [place] [address] BILLING QUESTIONS: [phone]   [phone]         Ingrown Toenail          What Is an Ingrown Toenail?  When a toenail is ingrown, it is curved and grows into the skin, usually at the nail borders (the sides of the nail). This  digging in  of the nail irritates the skin, often creating pain, redness, swelling, and warmth in the toe.  If an ingrown nail causes a break in the skin, bacteria may enter and cause an infection in the area, which is often marked by drainage and a foul odor. However, even if the toe isn t painful, red, swollen, or warm, a nail that curves downward into the skin can progress to an infection.  Causes  Causes of ingrown toenails include:  Heredity. In many people, the tendency for ingrown toenails is inherited.   Trauma. Sometimes an ingrown toenail is the result of trauma, such as stubbing your toe, having an object fall on your toe, or engaging in activities that involve repeated pressure on the toes, such as kicking or running.   Improper trimming. The most common cause of ingrown toenails is cutting your nails too short. This encourages the skin  next to the nail to fold over the nail.   Improperly sized footwear. Ingrown toenails can result from wearing socks and shoes that are tight or short.   Nail Conditions. Ingrown toenails can be caused by nail problems, such as fungal infections or losing a nail due to trauma.   Treatment  Sometimes initial treatment for ingrown toenails can be safely performed at home. However, home treatment is strongly discouraged if an infection is suspected, or for those who have medical conditions that put feet at high risk, such as diabetes, nerve damage in the foot, or poor circulation.  Home care:  If you don t have an infection or any of the above medical conditions, you can soak your foot in room-temperature water (adding Epsom s salt may be recommended by your doctor), and gently massage the side of the nail fold to help reduce the inflammation.  Avoid attempting  bathroom surgery.  Repeated cutting of the nail can cause the condition to worsen over time. If your symptoms fail to improve, it s time to see a foot and ankle surgeon.  Physician care:  After examining the toe, the foot and ankle surgeon will select the treatment best suited for you. If an infection is present, an oral antibiotic may be prescribed.  Sometimes a minor surgical procedure, often performed in the office, will ease the pain and remove the offending nail. After applying a local anesthetic, the doctor removes part of the nail s side border. Some nails may become ingrown again, requiring removal of the nail root.  Following the nail procedure, a light bandage will be applied. Most people experience very little pain after surgery and may resume normal activity the next day. If your surgeon has prescribed an oral antibiotic, be sure to take all the medication, even if your symptoms have improved.      Preventing Ingrown Toenails  Many cases of ingrown toenails may be prevented by:  Proper trimming. Cut toenails in a fairly straight line, and don t cut  them too short. You should be able to get your fingernail under the sides and end of the nail.   Well-fitted shoes and socks. Don t wear shoes that are short or tight in the toe area. Avoid shoes that are loose, because they too cause pressure on the toes, especially when running or walking briskly.           INGROWN TOENAIL POSTOPERATIVE INSTRUCTIONS   (Nail avulsion or chemical matrixectomy)   1 Go directly home and elevate the affected foot on one or two pillows for the remainder of the day/evening. Your toe may stay numb for the next 2-8 hours.   2 Take Tylenol, ibuprofen or another anti-inflammatory as needed for pain.   3 Take antibiotic if that has been prescribed. Take the entire prescribed antibiotic even if your symptoms have improved.   4 Keep dressing dry and intact the day of the procedure. The morning after the procedure, remove entire dressing and soak/wash the affected area in warm water (you may add Epsom salt) for 5 to 10 minutes. Do this twice a day for 2-4 weeks (6-8 weeks if you had phenol) (you may count showering/bathing as one soak).  After soaks, pat the area dry and then allow to airdry for a few minutes. Apply antibiotic ointment to the area and cover with 2 X 2 gauze and paper tape or a band-aid.  5 May walk and pursue everyday activities as tolerated with either an open toe shoe or cut-out shoe as needed. May wear regular shoes if no pain is noted.  6 Watch for any signs and symptoms of infection such as: redness, red streaks going up the foot/leg, swelling, pus or foul odor. Those that have had the phenol procedure, the toe will drain longer and will look like it is infected because it is a chemical burn.  Please call with questions.  NAIL FUNGUS / ONYCHOMYCOSIS   Nail fungus is not a hygiene problem and will not likely lead to significant medical   problems. The nails may get thick causing pain and possibly local skin infection.   Treatments include debridement (trimming), oral  antifungals, topical antifungals and complete removal of the nail. Most fungal nails are not treated.   Topicals such as tea tree oil can be helpful for surface fungus and may, at best, limit   progression. Over the counter creams (such as Lamisil) can also be used however, their effectiveness is also quite low.  Topical treatment with Pen lac is expensive and often not covered by insurance. Pen lac has an approximate 8% success rate. Topical therapy recommendations is to apply twice a day for at least 3-4 months as it takes 9 months for new nail to grow out.    Experts suggest soaking your feet for 15 to 20 minutes in a mixture of 1 cup vinegar to 4 cups warm water. Be sure to rinse well and pat your feet dry when you're done. You can soak your feet like this daily. But if your skin becomes irritated, try soaking only two to three times a week. Vicks VapoRub, as with vinegar, there have been no controlled clinical trials to assess the effectiveness of Vicks VapoRub on nail fungus, but there have been numerous anecdotal reports that it works. There's no consensus on how often to apply this product, so check with your doctor before using it on your nails.     Oral therapies include Sporanox and Lamisil. Oral therapies are also expensive and not very effective. Side effects such as liver disease are the main concern. Return of fungus is common even if the treatment worked.     Other Tips:  - Penlac nail medication apply daily x 4 months; remove old polish first day of each week  - Antifungal cream/powder (Zeasorb) - apply daily to feet and shoes x 2 months  - Clean shoes with Lysol or in washing machine every few weeks  - Rotate shoe gear; give them 24 hours to dry out between days wearing them  - Clean pair of socks in morning, clean pair in afternoon if your feet sweat  - Shower shoes used in public showers/pools      Body Mass Index (BMI)  Many things can cause foot and ankle problems. Foot structure, activity  "level, foot mechanics and injuries are common causes of pain.  One very important issue that often goes unmentioned, is body weight. Extra weight can cause increased stress on muscles, ligaments, bones and tendons.  Sometimes just a few extra pounds is all it takes to put one over her/his threshold. Without reducing that stress, it can be difficult to alleviate pain. Some people are uncomfortable addressing this issue, but we feel it is important for you to think about it. As Foot &  Ankle specialists, our job is addressing the lower extremity problem and possible causes. Regarding extra body weight, we encourage patients to discuss diet and weight management plans with their primary care doctors. It is this team approach that gives you the best opportunity for pain relief and getting you back on your feet.              Follow-ups after your visit        Follow-up notes from your care team     Return if symptoms worsen or fail to improve.      Who to contact     If you have questions or need follow up information about today's clinic visit or your schedule please contact [facility] directly at [phone].  Normal or non-critical lab and imaging results will be communicated to you by [employer], letter or phone within 4 business days after the clinic has received the results. If you do not hear from us within 7 days, please contact the clinic through [employer] or phone. If you have a critical or abnormal lab result, we will notify you by phone as soon as possible.  Submit refill requests through [employer] or call your pharmacy and they will forward the refill request to us. Please allow 3 business days for your refill to be completed.          Additional Information About Your Visit        [employer] Information     [employer] lets you send messages to your doctor, view your test results, renew your prescriptions, schedule appointments and more. To sign up, go to [email or web address] . Click on \"Log in\" on the " "left side of the screen, which will take you to the Welcome page. Then click on \"Sign up Now\" on the right side of the page.     You will be asked to enter the access code listed below, as well as some personal information. Please follow the directions to create your username and password.     Your access code is: [email or web address]  Expires: 2018  9:33 AM     Your access code will  in 90 days. If you need help or a new code, please call your [place] clinic or [phone].        Care EveryWhere ID     This is your Care EveryWhere ID. This could be used by other organizations to access your [place] medical records  [id number]        Your Vitals Were     Pulse Height BMI (Body Mass Index)             68 6' 1\" (1.854 m) 32.59 kg/m2          Blood Pressure from Last 3 Encounters:   18 128/79   18 132/82   11/10/16 118/70    Weight from Last 3 Encounters:   18 247 lb (112 kg)   18 237 lb (107.5 kg)   11/10/16 240 lb 12.8 oz (109.2 kg)              Today, you had the following     No orders found for display       Primary Care Provider Office Phone # Fax #    Juhi Skelton PA-C [phone] [address] [facility] [address]  [place] [address]        Equal Access to Services     [facility]: Hadii aad ku hadasho Soomaali, waaxda luqadaha, qaybta kaalmada adeegyada, alen pearson . So [facility] [phone].    ATENCIÓN: Si habla español, tiene a hardin disposición servicios gratuitos de asistencia lingüística. Russ al [phone].    We comply with applicable federal civil rights laws and [place] laws. We do not discriminate on the basis of race, color, national origin, age, disability, sex, sexual orientation, or gender identity.            Thank you!     Thank you for choosing [facility]  for your care. Our goal is always to provide you with excellent care. Hearing back from our patients is one way we can continue to improve our services. " Please take a few minutes to complete the written survey that you may receive in the mail after your visit with us. Thank you!             Your Updated Medication List - Protect others around you: Learn how to safely use, store and throw away your medicines at www.disposemymeds.org.          This list is accurate as of 3/6/18  5:04 PM.  Always use your most recent med list.                   Brand Name Dispense Instructions for use Diagnosis    atorvastatin 10 MG tablet    LIPITOR    90 tablet    TAKE 1 TABLET BY MOUTH DAILY    Hyperlipidemia LDL goal <160       buPROPion 300 MG 24 hr tablet    WELLBUTRIN XL    90 tablet    TAKE 1 TABLET (300 MG) BY MOUTH EVERY MORNING    Major depressive disorder, recurrent episode, moderate (H)       ipratropium 0.03 % spray    ATROVENT    1 Box    Spray 2 sprays into both nostrils every 12 hours    Chronic vasomotor rhinitis       terbinafine 250 MG tablet    lamISIL     Take 250 mg by mouth daily        vitamin D 2000 UNITS Caps      Take  by mouth.

## 2018-03-06 NOTE — LETTER
3/6/2018         RE: Syed Terrell  [address]  [place] [facility] [address]        Dear Colleague,    Thank you for referring your patient, Syed Terrell, to the [facility]. Please see a copy of my visit note below.    Weight management plan: Patient was referred to their PCP to discuss a diet and exercise plan.     JIMMY Humphreys MA March 6, 2018 4:37 PM      PATIENT HISTORY:  Syed Terrell is a 52 year old male who presents to clinic for b/l 1st toenail issues, left more deformed and painful.  Pt is on oral Lamisil.  No other treatments.  Too early to know if this is helping.  Right 1st toenail with some sensitivity distally.  Pain can be worse in shoes overall. 0-5/10 pain.      Review of Systems:  Patient denies fever, chills, rash, wound, stiffness, limping, numbness, weakness, heart burn, blood in stool, chest pain with activity, calf pain when walking, shortness of breath with activity, chronic cough, easy bleeding/bruising, swelling of ankles, excessive thirst, fatigue, depression, anxiety.       PAST MEDICAL HISTORY:   Past Medical History:   Diagnosis Date     Colon polyp 2010    Repeat colonoscopy 2015     FH: colon cancer      Hyperlipidemia LDL goal <160      Moderate major depression (H) 6/12/2013     VANESSA on CPAP     could not tolerate CPAP        PAST SURGICAL HISTORY: No past surgical history on file.     MEDICATIONS:   Current Outpatient Prescriptions:      terbinafine (LAMISIL) 250 MG tablet, Take 250 mg by mouth daily, Disp: , Rfl:      buPROPion (WELLBUTRIN XL) 300 MG 24 hr tablet, TAKE 1 TABLET (300 MG) BY MOUTH EVERY MORNING, Disp: 90 tablet, Rfl: 3     ipratropium (ATROVENT) 0.03 % spray, Spray 2 sprays into both nostrils every 12 hours, Disp: 1 Box, Rfl: 11     atorvastatin (LIPITOR) 10 MG tablet, TAKE 1 TABLET BY MOUTH DAILY, Disp: 90 tablet, Rfl: 3     Cholecalciferol (VITAMIN D) 2000 UNITS CAPS, Take  by mouth., Disp: , Rfl:      ALLERGIES:    Allergies   Allergen  "Reactions     No Known Drug Allergy         SOCIAL HISTORY:   Social History     Social History     Marital status:      Spouse name: N/A     Number of children: N/A     Years of education: N/A     Occupational History     Not on file.     Social History Main Topics     Smoking status: Never Smoker     Smokeless tobacco: Never Used     Alcohol use 0.0 - 0.6 oz/week     0 - 1 Standard drinks or equivalent per week      Comment: 0-1 drinks per week     Drug use: No     Sexual activity: Yes     Partners: Female     Other Topics Concern     Not on file     Social History Narrative    , 2 sons ages 21 and 17        FAMILY HISTORY:   Family History   Problem Relation Age of Onset     Cancer - colorectal Maternal Grandfather 70     GASTROINTESTINAL DISEASE Mother      colon polyps     C.A.D. Paternal Grandfather      MI age 65     Prostate Cancer Father 78        EXAM:Vitals: /79 (BP Location: Right arm, Patient Position: Chair, Cuff Size: Adult Large)  Pulse 68  Ht 6' 1\" (1.854 m)  Wt 247 lb (112 kg)  BMI 32.59 kg/m2  BMI= Body mass index is 32.59 kg/(m^2).    General appearance: Patient is alert and fully cooperative with history & exam.  No sign of distress is noted during the visit.     Psychiatric: Affect is pleasant & appropriate.  Patient appears motivated to improve health.     Respiratory: Breathing is regular & unlabored while sitting.     HEENT: Hearing is intact to spoken word.  Speech is clear.  No gross evidence of visual impairment that would impact ambulation.     Dermatologic: b/l hallux nails thickened, dystrophic, discolored.  Left considerably more thickened with central peaking, ingrowing borders.  Pain to pressure.  No paronychia or evidence of soft tissue infection is noted.     Vascular: DP & PT pulses are intact & regular bilaterally.  No significant edema or varicosities noted.  CFT and skin temperature are normal to both lower extremities.     Neurologic: Lower extremity " sensation is intact to light touch.  No evidence of weakness or contracture in the lower extremities.  No evidence of neuropathy.     Musculoskeletal: Patient is ambulatory without assistive device or brace.  No gross ankle deformity noted.  No foot or ankle joint effusion is noted.     ASSESSMENT: b/l 1st toe onychomycosis, left onychocryptosis.     PLAN:  Reviewed patient's chart in epic.  Discussed condition and treatment options including pros and cons.    Discussed causes of nail fungus and ingrowing nails.      Discussed fungus treatment options with patient and explained that there isn't one treatment that is 100% effective.  Debridement is an option.  Discussed oral lamisil which is the most effective but can have liver effects.  Discussed topical options, which are less effective.  Also discussed that if there was damage to the nail and the nail is now dystrophic that none of the above is going to change the nail.      Discussed nail removal as an option for fungus/ingrowing nail.  Given level of deformity of left hallux nail, I don't think partial removal would be adequate.    The patient elected to proceed with nonpermanent partial removal of the left hallux nail.  Discussed risk of painful dystrophic nail regrowth, infection, wound healing issues.      Procedure:  In addition to office visit.  After consent, the left 1st toe was anesthetized with 5cc's of 1% lidocaine plain after alcohol prep. Betadine prep performed.  A tourniquet was applied to the toe. Using sterile instrumentation, the nail was elevated from underlying skin and avulsed.  Bacitracin was applied to the nail bed.  The tourniquet was removed and a hyperemic response was noted.  Bandage was applied to the toe.  The patient tolerated the procedure and anesthesia well.    Post op instructions provided and discussed with pt.  F/u prn.      Mo Dalal, JESS, FACFAS      Again, thank you for allowing me to participate in the care of  your patient.        Sincerely,        Mo Dalal DPM

## 2018-03-06 NOTE — NURSING NOTE
"Chief Complaint   Patient presents with     Toenail     B/L 1st toe pain L>R       Initial /79 (BP Location: Right arm, Patient Position: Chair, Cuff Size: Adult Large)  Pulse 68  Ht 6' 1\" (1.854 m)  Wt 247 lb (112 kg)  BMI 32.59 kg/m2 Estimated body mass index is 32.59 kg/(m^2) as calculated from the following:    Height as of this encounter: 6' 1\" (1.854 m).    Weight as of this encounter: 247 lb (112 kg).  Medication Reconciliation: christal Humphreys MA March 6, 2018 4:36 PM  "

## 2018-03-06 NOTE — PROGRESS NOTES
Weight management plan: Patient was referred to their PCP to discuss a diet and exercise plan.     JIMMY Humphreys MA March 6, 2018 4:37 PM

## 2018-03-06 NOTE — PATIENT INSTRUCTIONS
Thank you for choosing [facility]!    Follow up as needed.    DR. MENA'S CLINIC LOCATIONS     MONDAY  [place] TUESDAY & FRIDAY AM  [place]   [address] [facility]   [address]   [place] [address] [place] DE [place]ra [address]   [phone]  [phone]  [phone]       WEDNESDAY  [place] SCHEDULE SURGERY: [phone]   [address] [place] [address] APPOINTMENTS: [phone]   [place] [address] BILLING QUESTIONS: [phone]   [phone]         Ingrown Toenail          What Is an Ingrown Toenail?  When a toenail is ingrown, it is curved and grows into the skin, usually at the nail borders (the sides of the nail). This  digging in  of the nail irritates the skin, often creating pain, redness, swelling, and warmth in the toe.  If an ingrown nail causes a break in the skin, bacteria may enter and cause an infection in the area, which is often marked by drainage and a foul odor. However, even if the toe isn t painful, red, swollen, or warm, a nail that curves downward into the skin can progress to an infection.  Causes  Causes of ingrown toenails include:  Heredity. In many people, the tendency for ingrown toenails is inherited.   Trauma. Sometimes an ingrown toenail is the result of trauma, such as stubbing your toe, having an object fall on your toe, or engaging in activities that involve repeated pressure on the toes, such as kicking or running.   Improper trimming. The most common cause of ingrown toenails is cutting your nails too short. This encourages the skin next to the nail to fold over the nail.   Improperly sized footwear. Ingrown toenails can result from wearing socks and shoes that are tight or short.   Nail Conditions. Ingrown toenails can be caused by nail problems, such as fungal infections or losing a nail due to trauma.   Treatment  Sometimes initial treatment for ingrown toenails can be safely performed at home.  However, home treatment is strongly discouraged if an infection is suspected, or for those who have medical conditions that put feet at high risk, such as diabetes, nerve damage in the foot, or poor circulation.  Home care:  If you don t have an infection or any of the above medical conditions, you can soak your foot in room-temperature water (adding Epsom s salt may be recommended by your doctor), and gently massage the side of the nail fold to help reduce the inflammation.  Avoid attempting  bathroom surgery.  Repeated cutting of the nail can cause the condition to worsen over time. If your symptoms fail to improve, it s time to see a foot and ankle surgeon.  Physician care:  After examining the toe, the foot and ankle surgeon will select the treatment best suited for you. If an infection is present, an oral antibiotic may be prescribed.  Sometimes a minor surgical procedure, often performed in the office, will ease the pain and remove the offending nail. After applying a local anesthetic, the doctor removes part of the nail s side border. Some nails may become ingrown again, requiring removal of the nail root.  Following the nail procedure, a light bandage will be applied. Most people experience very little pain after surgery and may resume normal activity the next day. If your surgeon has prescribed an oral antibiotic, be sure to take all the medication, even if your symptoms have improved.      Preventing Ingrown Toenails  Many cases of ingrown toenails may be prevented by:  Proper trimming. Cut toenails in a fairly straight line, and don t cut them too short. You should be able to get your fingernail under the sides and end of the nail.   Well-fitted shoes and socks. Don t wear shoes that are short or tight in the toe area. Avoid shoes that are loose, because they too cause pressure on the toes, especially when running or walking briskly.           INGROWN TOENAIL POSTOPERATIVE INSTRUCTIONS   (Nail avulsion or  chemical matrixectomy)   1 Go directly home and elevate the affected foot on one or two pillows for the remainder of the day/evening. Your toe may stay numb for the next 2-8 hours.   2 Take Tylenol, ibuprofen or another anti-inflammatory as needed for pain.   3 Take antibiotic if that has been prescribed. Take the entire prescribed antibiotic even if your symptoms have improved.   4 Keep dressing dry and intact the day of the procedure. The morning after the procedure, remove entire dressing and soak/wash the affected area in warm water (you may add Epsom salt) for 5 to 10 minutes. Do this twice a day for 2-4 weeks (6-8 weeks if you had phenol) (you may count showering/bathing as one soak).  After soaks, pat the area dry and then allow to airdry for a few minutes. Apply antibiotic ointment to the area and cover with 2 X 2 gauze and paper tape or a band-aid.  5 May walk and pursue everyday activities as tolerated with either an open toe shoe or cut-out shoe as needed. May wear regular shoes if no pain is noted.  6 Watch for any signs and symptoms of infection such as: redness, red streaks going up the foot/leg, swelling, pus or foul odor. Those that have had the phenol procedure, the toe will drain longer and will look like it is infected because it is a chemical burn.  Please call with questions.  NAIL FUNGUS / ONYCHOMYCOSIS   Nail fungus is not a hygiene problem and will not likely lead to significant medical   problems. The nails may get thick causing pain and possibly local skin infection.   Treatments include debridement (trimming), oral antifungals, topical antifungals and complete removal of the nail. Most fungal nails are not treated.   Topicals such as tea tree oil can be helpful for surface fungus and may, at best, limit   progression. Over the counter creams (such as Lamisil) can also be used however, their effectiveness is also quite low.  Topical treatment with Pen lac is expensive and often not covered  by insurance. Pen lac has an approximate 8% success rate. Topical therapy recommendations is to apply twice a day for at least 3-4 months as it takes 9 months for new nail to grow out.    Experts suggest soaking your feet for 15 to 20 minutes in a mixture of 1 cup vinegar to 4 cups warm water. Be sure to rinse well and pat your feet dry when you're done. You can soak your feet like this daily. But if your skin becomes irritated, try soaking only two to three times a week. Vicks VapoRub, as with vinegar, there have been no controlled clinical trials to assess the effectiveness of Vicks VapoRub on nail fungus, but there have been numerous anecdotal reports that it works. There's no consensus on how often to apply this product, so check with your doctor before using it on your nails.     Oral therapies include Sporanox and Lamisil. Oral therapies are also expensive and not very effective. Side effects such as liver disease are the main concern. Return of fungus is common even if the treatment worked.     Other Tips:  - Penlac nail medication apply daily x 4 months; remove old polish first day of each week  - Antifungal cream/powder (Zeasorb) - apply daily to feet and shoes x 2 months  - Clean shoes with Lysol or in washing machine every few weeks  - Rotate shoe gear; give them 24 hours to dry out between days wearing them  - Clean pair of socks in morning, clean pair in afternoon if your feet sweat  - Shower shoes used in public showers/pools      Body Mass Index (BMI)  Many things can cause foot and ankle problems. Foot structure, activity level, foot mechanics and injuries are common causes of pain.  One very important issue that often goes unmentioned, is body weight. Extra weight can cause increased stress on muscles, ligaments, bones and tendons.  Sometimes just a few extra pounds is all it takes to put one over her/his threshold. Without reducing that stress, it can be difficult to alleviate pain. Some people are  uncomfortable addressing this issue, but we feel it is important for you to think about it. As Foot &  Ankle specialists, our job is addressing the lower extremity problem and possible causes. Regarding extra body weight, we encourage patients to discuss diet and weight management plans with their primary care doctors. It is this team approach that gives you the best opportunity for pain relief and getting you back on your feet.

## 2019-01-28 DIAGNOSIS — E78.5 HYPERLIPIDEMIA LDL GOAL <160: ICD-10-CM

## 2019-01-28 DIAGNOSIS — F33.1 MAJOR DEPRESSIVE DISORDER, RECURRENT EPISODE, MODERATE (H): ICD-10-CM

## 2019-01-28 NOTE — TELEPHONE ENCOUNTER
"   buPROPion (WELLBUTRIN XL) 300 MG 24 hr tablet 90 tablet 3 1/4/2018     atorvastatin (LIPITOR) 10 MG tablet 90 tablet 3 1/4/2018         Last Written Prescription Date:  01/04/2018  Last Fill Quantity: 90,  # refills: 3   Last office visit: 1/4/2018 with prescribing provider:  Juhi Skelton   Future Office Visit:   Next 5 appointments (look out 90 days)    Feb 05, 2019  9:30 AM CST  PHYSICAL with Juhi Skelton PA-C  [facility] [address] [facility] [address]  [phone]         PHQ-9 SCORE 12/17/2015 11/10/2016 1/4/2018   PHQ-9 Total Score - - -   PHQ-9 Total Score 2 3 4       Requested Prescriptions   Pending Prescriptions Disp Refills     buPROPion (WELLBUTRIN XL) 300 MG 24 hr tablet [Pharmacy Med Name: BUPROPION HCL  MG TABLET] 90 tablet 3     Sig: TAKE 1 TABLET BY MOUTH EVERY MORNING    SSRIs Protocol Failed - 1/28/2019  1:31 AM       Failed - PHQ-9 score less than 5 in past 6 months    Please review last PHQ-9 score.          Passed - Medication is Bupropion    If the medication is Bupropion (Wellbutrin), and the patient is taking for smoking cessation; OK to refill.         Passed - Medication is active on med list       Passed - Patient is age 18 or older       Passed - Recent (6 mo) or future (30 days) visit within the authorizing provider's specialty    Patient had office visit in the last 6 months or has a visit in the next 30 days with authorizing provider or within the authorizing provider's specialty.  See \"Patient Info\" tab in inbasket, or \"Choose Columns\" in Meds & Orders section of the refill encounter.            atorvastatin (LIPITOR) 10 MG tablet [Pharmacy Med Name: ATORVASTATIN 10 MG TABLET] 90 tablet 3     Sig: TAKE 1 TABLET BY MOUTH DAILY    Statins Protocol Failed - 1/28/2019  1:31 AM       Failed - LDL on file in past 12 months    Recent Labs   Lab Test 01/04/18  0953   *            Passed - No abnormal creatine kinase in past 12 " "months    No lab results found.            Passed - Recent (12 mo) or future (30 days) visit within the authorizing provider's specialty    Patient had office visit in the last 12 months or has a visit in the next 30 days with authorizing provider or within the authorizing provider's specialty.  See \"Patient Info\" tab in inbasket, or \"Choose Columns\" in Meds & Orders section of the refill encounter.             Passed - Medication is active on med list       Passed - Patient is age 18 or older          "

## 2019-01-29 RX ORDER — BUPROPION HYDROCHLORIDE 300 MG/1
TABLET ORAL
Qty: 30 TABLET | Refills: 0 | Status: SHIPPED | OUTPATIENT
Start: 2019-01-29 | End: 2019-02-05

## 2019-01-29 RX ORDER — ATORVASTATIN CALCIUM 10 MG/1
TABLET, FILM COATED ORAL
Qty: 30 TABLET | Refills: 0 | Status: SHIPPED | OUTPATIENT
Start: 2019-01-29 | End: 2019-02-05

## 2019-01-29 NOTE — TELEPHONE ENCOUNTER
Medication is being filled for 1 time refill only due to:  due for annual apt and is scheduled 2/5/19.   Deya Gilliland RN

## 2019-02-05 ENCOUNTER — OFFICE VISIT (OUTPATIENT)
Dept: FAMILY MEDICINE | Facility: CLINIC | Age: 54
End: 2019-02-05

## 2019-02-05 VITALS
HEIGHT: 73 IN | TEMPERATURE: 97.3 F | WEIGHT: 244.3 LBS | SYSTOLIC BLOOD PRESSURE: 120 MMHG | OXYGEN SATURATION: 97 % | BODY MASS INDEX: 32.38 KG/M2 | DIASTOLIC BLOOD PRESSURE: 85 MMHG | HEART RATE: 50 BPM

## 2019-02-05 DIAGNOSIS — Z11.4 SCREENING FOR HIV (HUMAN IMMUNODEFICIENCY VIRUS): ICD-10-CM

## 2019-02-05 DIAGNOSIS — F40.298 NEEDLE PHOBIA: ICD-10-CM

## 2019-02-05 DIAGNOSIS — Z23 NEED FOR VACCINATION: ICD-10-CM

## 2019-02-05 DIAGNOSIS — F33.1 MAJOR DEPRESSIVE DISORDER, RECURRENT EPISODE, MODERATE (H): ICD-10-CM

## 2019-02-05 DIAGNOSIS — E78.5 HYPERLIPIDEMIA LDL GOAL <160: ICD-10-CM

## 2019-02-05 DIAGNOSIS — R53.83 FATIGUE, UNSPECIFIED TYPE: ICD-10-CM

## 2019-02-05 DIAGNOSIS — Z12.5 SCREENING FOR PROSTATE CANCER: ICD-10-CM

## 2019-02-05 DIAGNOSIS — Z00.00 ROUTINE GENERAL MEDICAL EXAMINATION AT A HEALTH CARE FACILITY: Primary | ICD-10-CM

## 2019-02-05 DIAGNOSIS — G47.33 OSA ON CPAP: ICD-10-CM

## 2019-02-05 LAB
ALBUMIN SERPL-MCNC: 4.2 G/DL (ref 3.4–5)
ALP SERPL-CCNC: 78 U/L (ref 40–150)
ALT SERPL W P-5'-P-CCNC: 52 U/L (ref 0–70)
ANION GAP SERPL CALCULATED.3IONS-SCNC: 5 MMOL/L (ref 3–14)
AST SERPL W P-5'-P-CCNC: 24 U/L (ref 0–45)
BILIRUB SERPL-MCNC: 0.8 MG/DL (ref 0.2–1.3)
BUN SERPL-MCNC: 19 MG/DL (ref 7–30)
CALCIUM SERPL-MCNC: 9.3 MG/DL (ref 8.5–10.1)
CHLORIDE SERPL-SCNC: 107 MMOL/L (ref 94–109)
CHOLEST SERPL-MCNC: 189 MG/DL
CO2 SERPL-SCNC: 27 MMOL/L (ref 20–32)
CREAT SERPL-MCNC: 1.08 MG/DL (ref 0.66–1.25)
ERYTHROCYTE [DISTWIDTH] IN BLOOD BY AUTOMATED COUNT: 12.9 % (ref 10–15)
GFR SERPL CREATININE-BSD FRML MDRD: 77 ML/MIN/{1.73_M2}
GLUCOSE SERPL-MCNC: 92 MG/DL (ref 70–99)
HCT VFR BLD AUTO: 41.7 % (ref 40–53)
HDLC SERPL-MCNC: 32 MG/DL
HGB BLD-MCNC: 14.6 G/DL (ref 13.3–17.7)
HIV 1+2 AB+HIV1 P24 AG SERPL QL IA: NONREACTIVE
LDLC SERPL CALC-MCNC: 105 MG/DL
MCH RBC QN AUTO: 32.4 PG (ref 26.5–33)
MCHC RBC AUTO-ENTMCNC: 35 G/DL (ref 31.5–36.5)
MCV RBC AUTO: 93 FL (ref 78–100)
NONHDLC SERPL-MCNC: 157 MG/DL
PLATELET # BLD AUTO: 229 10E9/L (ref 150–450)
POTASSIUM SERPL-SCNC: 4 MMOL/L (ref 3.4–5.3)
PROT SERPL-MCNC: 7.5 G/DL (ref 6.8–8.8)
PSA SERPL-MCNC: 0.71 UG/L (ref 0–4)
RBC # BLD AUTO: 4.5 10E12/L (ref 4.4–5.9)
SODIUM SERPL-SCNC: 139 MMOL/L (ref 133–144)
TRIGL SERPL-MCNC: 261 MG/DL
TSH SERPL DL<=0.005 MIU/L-ACNC: 2.5 MU/L (ref 0.4–4)
WBC # BLD AUTO: 6.3 10E9/L (ref 4–11)

## 2019-02-05 PROCEDURE — 84443 ASSAY THYROID STIM HORMONE: CPT | Performed by: PHYSICIAN ASSISTANT

## 2019-02-05 PROCEDURE — 90471 IMMUNIZATION ADMIN: CPT | Performed by: PHYSICIAN ASSISTANT

## 2019-02-05 PROCEDURE — 80053 COMPREHEN METABOLIC PANEL: CPT | Performed by: PHYSICIAN ASSISTANT

## 2019-02-05 PROCEDURE — 36415 COLL VENOUS BLD VENIPUNCTURE: CPT | Performed by: PHYSICIAN ASSISTANT

## 2019-02-05 PROCEDURE — 87389 HIV-1 AG W/HIV-1&-2 AB AG IA: CPT | Performed by: PHYSICIAN ASSISTANT

## 2019-02-05 PROCEDURE — 80061 LIPID PANEL: CPT | Performed by: PHYSICIAN ASSISTANT

## 2019-02-05 PROCEDURE — 99396 PREV VISIT EST AGE 40-64: CPT | Performed by: PHYSICIAN ASSISTANT

## 2019-02-05 PROCEDURE — 85027 COMPLETE CBC AUTOMATED: CPT | Performed by: PHYSICIAN ASSISTANT

## 2019-02-05 PROCEDURE — 84153 ASSAY OF PSA TOTAL: CPT | Performed by: PHYSICIAN ASSISTANT

## 2019-02-05 PROCEDURE — 90715 TDAP VACCINE 7 YRS/> IM: CPT | Performed by: PHYSICIAN ASSISTANT

## 2019-02-05 RX ORDER — BUPROPION HYDROCHLORIDE 300 MG/1
TABLET ORAL
Qty: 90 TABLET | Refills: 3 | Status: SHIPPED | OUTPATIENT
Start: 2019-02-05 | End: 2020-03-02

## 2019-02-05 RX ORDER — ATORVASTATIN CALCIUM 10 MG/1
10 TABLET, FILM COATED ORAL DAILY
Qty: 90 TABLET | Refills: 3 | Status: SHIPPED | OUTPATIENT
Start: 2019-02-05 | End: 2020-01-10

## 2019-02-05 ASSESSMENT — MIFFLIN-ST. JEOR: SCORE: 2002.02

## 2019-02-05 ASSESSMENT — PATIENT HEALTH QUESTIONNAIRE - PHQ9: SUM OF ALL RESPONSES TO PHQ QUESTIONS 1-9: 6

## 2019-02-05 NOTE — NURSING NOTE
Screening Questionnaire for Adult Immunization    Are you sick today?   No   Do you have allergies to medications, food, a vaccine component or latex?   No   Have you ever had a serious reaction after receiving a vaccination?   No   Do you have a long-term health problem with heart disease, lung disease, asthma, kidney disease, metabolic disease (e.g. diabetes), anemia, or other blood disorder?   No   Do you have cancer, leukemia, HIV/AIDS, or any other immune system problem?   No   In the past 3 months, have you taken medications that affect  your immune system, such as prednisone, other steroids, or anticancer drugs; drugs for the treatment of rheumatoid arthritis, Crohn s disease, or psoriasis; or have you had radiation treatments?   No   Have you had a seizure, or a brain or other nervous system problem?   No   During the past year, have you received a transfusion of blood or blood     products, or been given immune (gamma) globulin or antiviral drug?   No   For women: Are you pregnant or is there a chance you could become        pregnant during the next month?   No   Have you received any vaccinations in the past 4 weeks?   No     Immunization questionnaire answers were all negative.        Per orders of EVANGELINA Bean, injection of Tdap given by Andree Dill. Patient instructed to remain in clinic for 15 minutes afterwards, and to report any adverse reaction to me immediately.  Prior to injection, verified patient identity using patient's name and date of birth.  Due to injection administration, patient instructed to remain in clinic for 15 minutes  afterwards, and to report any adverse reaction to me immediately.       Screening performed by Andree Dill on 2/5/2019 at 10:46 AM.

## 2019-02-05 NOTE — PATIENT INSTRUCTIONS
Preventive Health Recommendations  Male Ages 50 - 64    Yearly exam:             See your health care provider every year in order to  o   Review health changes.   o   Discuss preventive care.    o   Review your medicines if your doctor has prescribed any.     Have a cholesterol test every 5 years, or more frequently if you are at risk for high cholesterol/heart disease.     Have a diabetes test (fasting glucose) every three years. If you are at risk for diabetes, you should have this test more often.     Have a colonoscopy at age 50, or have a yearly FIT test (stool test). These exams will check for colon cancer.      Talk with your health care provider about whether or not a prostate cancer screening test (PSA) is right for you.    You should be tested each year for STDs (sexually transmitted diseases), if you re at risk.     Shots: Get a flu shot each year. Get a tetanus shot every 10 years.     Nutrition:    Eat at least 5 servings of fruits and vegetables daily.     Eat whole-grain bread, whole-wheat pasta and brown rice instead of white grains and rice.     Get adequate Calcium and Vitamin D.     Lifestyle    Exercise for at least 150 minutes a week (30 minutes a day, 5 days a week). This will help you control your weight and prevent disease.     Limit alcohol to one drink per day.     No smoking.     Wear sunscreen to prevent skin cancer.     See your dentist every six months for an exam and cleaning.     See your eye doctor every 1 to 2 years.    Start getting on your bike right after work for at least 20 minutes daily  Stop in [facility] and see if Shingrex is covered.  We will give you your tetanus (Tdap) booster today.  Have your blood pressure rechecked a few times.  Home BP monitor ([employer]).  Goal <130/80

## 2019-02-05 NOTE — PROGRESS NOTES
SUBJECTIVE:   CC: Syed Terrell is an 53 year old male who presents for preventive health visit.     Pt complaints of persistent daytime sleepiness despite using his CPAP  He wonders if this is working approp; hasn't been back to [facility] in over 5 years.  Pt was walking at the [employer] on Sunday and felt hot and sweaty and needed to eat and felt better  No chest pain or sob. Worried now about diabetes.  He is concerned about his weight.  Hasn't been exercising and trouble controlling his appetite.    Healthy Habits:    Do you get at least three servings of calcium containing foods daily (dairy, green leafy vegetables, etc.)? yes    Amount of exercise or daily activities, outside of work: none    Problems taking medications regularly No    Medication side effects: No    Have you had an eye exam in the past two years? yes    Do you see a dentist twice per year? yes    Do you have sleep apnea, excessive snoring or daytime drowsiness?yes    Today's PHQ-2 Score:   PHQ-2 ( 1999 [employer]) 2/5/2019 1/4/2018   Q1: Little interest or pleasure in doing things 0 0   Q2: Feeling down, depressed or hopeless 0 0   PHQ-2 Score 0 0       Abuse: Current or Past(Physical, Sexual or Emotional)- No  Do you feel safe in your environment? Yes    Social History     Tobacco Use     Smoking status: Never Smoker     Smokeless tobacco: Never Used   Substance Use Topics     Alcohol use: Yes     Alcohol/week: 0.0 - 0.6 oz     Comment: 0-1 drinks per week     If you drink alcohol do you typically have >3 drinks per day or >7 drinks per week? No                      Last PSA:   PSA   Date Value Ref Range Status   07/18/2017 0.85 0 - 4 ug/L Final     Comment:     Assay Method:  Chemiluminescence using Siemens Vista analyzer       Reviewed orders with patient. Reviewed health maintenance and updated orders accordingly - Yes      Reviewed and updated as needed this visit by clinical staff  Tobacco  Allergies  Meds  Med Hx  Soc Hx        Reviewed and  "updated as needed this visit by Provider  Med Hx        Past Medical History:   Diagnosis Date     Colon polyp 2010    Repeat colonoscopy 2015     FH: colon cancer      Hyperlipidemia LDL goal <160      Moderate major depression (H) 6/12/2013     Needle phobia      VANESSA on CPAP     could not tolerate CPAP     No past surgical history on file.  Current Outpatient Medications   Medication Sig Dispense Refill     atorvastatin (LIPITOR) 10 MG tablet Take 1 tablet (10 mg) by mouth daily 90 tablet 3     buPROPion (WELLBUTRIN XL) 300 MG 24 hr tablet TAKE 1 TABLET BY MOUTH EVERY MORNING 90 tablet 3     Cholecalciferol (VITAMIN D) 2000 UNITS CAPS Take  by mouth.           ROS:  CONSTITUTIONAL: NEGATIVE for fever, chills, weight is up  INTEGUMENTARY/SKIN: NEGATIVE for worrisome rashes, moles or lesions  EYES: NEGATIVE for vision changes or irritation  ENT: NEGATIVE for ear, mouth and throat problems  RESP: NEGATIVE for significant cough or SOB  CV: NEGATIVE for chest pain, palpitations or peripheral edema  GI: NEGATIVE for nausea, abdominal pain, heartburn, or change in bowel habits   male: negative for dysuria, hematuria, decreased urinary stream, erectile dysfunction, urethral discharge  MUSCULOSKELETAL: NEGATIVE for significant arthralgias or myalgia  NEURO: NEGATIVE for weakness, dizziness or paresthesias  PSYCHIATRIC: NEGATIVE for changes in mood or affect    OBJECTIVE:   /85   Pulse 50   Temp 97.3  F (36.3  C) (Oral)   Ht 1.854 m (6' 1\")   Wt 110.8 kg (244 lb 4.8 oz)   SpO2 97%   BMI 32.23 kg/m    EXAM:  GENERAL: healthy, alert and no distress  EYES: Eyes grossly normal to inspection, PERRL and conjunctivae and sclerae normal  HENT: ear canals and TM's normal, nose and mouth without ulcers or lesions  NECK: no adenopathy, no asymmetry, masses, or scars and thyroid normal to palpation  RESP: lungs clear to auscultation - no rales, rhonchi or wheezes  CV: regular rate and rhythm, normal S1 S2, no S3 or S4, " "no murmur, click or rub, no peripheral edema and peripheral pulses strong  ABDOMEN: soft, nontender, no hepatosplenomegaly, no masses and bowel sounds normal  Rectal: prostate smooth without nodules. Rectal tone normal.  MS: no gross musculoskeletal defects noted, no edema  SKIN: no suspicious lesions or rashes  NEURO: Normal strength and tone, mentation intact and speech normal  PSYCH: mentation appears normal, affect normal/bright        ASSESSMENT/PLAN:   Assessment and Plan:     (Z00.00) Routine general medical examination at a health care facility  (primary encounter diagnosis)  Comment: colonoscopy up to date.    Plan: Comprehensive metabolic panel, CBC with         platelets        Discussed shingrex.    (F33.1) Major depressive disorder, recurrent episode, moderate (H)  Comment: start exercising for mood and weight reduction  Plan: buPROPion (WELLBUTRIN XL) 300 MG 24 hr tablet        refilled    (R53.83) Fatigue, unspecified type  Comment: exercise on home bike 20min per day minimum, f/u with [facility] to have CPAP checked.  Plan: TSH with free T4 reflex            (E78.5) Hyperlipidemia LDL goal <160  Comment:   Plan: Lipid Profile, atorvastatin (LIPITOR) 10 MG         tablet            (G47.33,  Z99.89) VANESSA on CPAP  Comment:   Plan: f/u with [facility]. Last saw Emmie HUTCHINSON    (Z11.4) Screening for HIV (human immunodeficiency virus)  Comment:   Plan: HIV Screening            (F40.298) Needle phobia  Comment:   Plan: gave vaccine and matheus blood in room with pt supine.    (Z12.5) Screening for prostate cancer  Comment:   Plan: PSA, tumor marker                COUNSELING:  Reviewed preventive health counseling, as reflected in patient instructions    BP Readings from Last 1 Encounters:   02/05/19 120/85     Estimated body mass index is 32.23 kg/m  as calculated from the following:    Height as of this encounter: 1.854 m (6' 1\").    Weight as of this encounter: 110.8 kg (244 lb 4.8 oz).           reports that  " has never smoked. he has never used smokeless tobacco.      Counseling Resources:  ATP IV Guidelines  Pooled Cohorts Equation Calculator  FRAX Risk Assessment  ICSI Preventive Guidelines  Dietary Guidelines for Americans, 2010  USDA's MyPlate  ASA Prophylaxis  Lung CA Screening    Juhi Skelton PA-C  [facility]

## 2019-02-06 NOTE — RESULT ENCOUNTER NOTE
It was a pleasure seeing you for your physical examination.  I wanted to get back to you with your test results.  I have enclosed a copy for your review.      I am happy to report that your CBC or complete blood count is normal with no signs of anemia, leukemia or platelet abnormalities. Your chemistry panel shows no signs of diabetes.  Your blood salts, kidney tests, liver tests, and proteins are all fine.    Your HIV screening test was negative.    Your TSH (thyroid test) was normal.    Your PSA (prostate cancer screening test) was normal.    Your total cholesterol is 189 with the normal range being below 200.  Your HDL or good cholesterol is 32 with the normal range being above 40.  Your LDL or bad cholesterol is 105 with the normal range being below 130.  Your triglycerides were higher this year at 261. Cutting down on carbs and increase exercise will improve this profile.    Let me know if you have any questions.    Juhi Skelton PA-C

## 2019-10-03 ENCOUNTER — HEALTH MAINTENANCE LETTER (OUTPATIENT)
Age: 54
End: 2019-10-03

## 2019-11-26 ENCOUNTER — ANCILLARY PROCEDURE (OUTPATIENT)
Dept: GENERAL RADIOLOGY | Facility: CLINIC | Age: 54
End: 2019-11-26
Attending: PHYSICIAN ASSISTANT
Payer: COMMERCIAL

## 2019-11-26 ENCOUNTER — OFFICE VISIT (OUTPATIENT)
Dept: FAMILY MEDICINE | Facility: CLINIC | Age: 54
End: 2019-11-26
Payer: COMMERCIAL

## 2019-11-26 VITALS
HEART RATE: 53 BPM | BODY MASS INDEX: 33 KG/M2 | SYSTOLIC BLOOD PRESSURE: 146 MMHG | HEIGHT: 73 IN | DIASTOLIC BLOOD PRESSURE: 84 MMHG | WEIGHT: 249 LBS | OXYGEN SATURATION: 100 % | TEMPERATURE: 97.2 F

## 2019-11-26 DIAGNOSIS — M25.552 BILATERAL HIP PAIN: Primary | ICD-10-CM

## 2019-11-26 DIAGNOSIS — M25.552 BILATERAL HIP PAIN: ICD-10-CM

## 2019-11-26 DIAGNOSIS — R03.0 ELEVATED BLOOD PRESSURE READING WITHOUT DIAGNOSIS OF HYPERTENSION: ICD-10-CM

## 2019-11-26 DIAGNOSIS — M25.551 BILATERAL HIP PAIN: Primary | ICD-10-CM

## 2019-11-26 DIAGNOSIS — M25.551 BILATERAL HIP PAIN: ICD-10-CM

## 2019-11-26 PROCEDURE — 73522 X-RAY EXAM HIPS BI 3-4 VIEWS: CPT

## 2019-11-26 PROCEDURE — 99214 OFFICE O/P EST MOD 30 MIN: CPT | Performed by: PHYSICIAN ASSISTANT

## 2019-11-26 ASSESSMENT — PATIENT HEALTH QUESTIONNAIRE - PHQ9: SUM OF ALL RESPONSES TO PHQ QUESTIONS 1-9: 7

## 2019-11-26 ASSESSMENT — MIFFLIN-ST. JEOR: SCORE: 2018.34

## 2019-11-26 NOTE — PATIENT INSTRUCTIONS
Advil 600mg 3 times per day with food for one week.    Purchase a home blood pressure monitor. (Omron) large cuff.    Monitor readings and send the results via [employer] in 2 weeks.    Try to find the motivation to exercise.

## 2019-11-26 NOTE — PROGRESS NOTES
"HPI: Syed is a 53 yo male here with bilat hip pain since early Sept  Started with the right hip but now in the L hip as well  Denies pain with walking or biking  Pain worse with lifting his leg if sitting  Denies numbness or tingling.  He has been more inactive than usual  Denies new lifting or exercises.  He now noticed some pain in the low back which is more recent (2 weeks)  This is worse first thing in the morning  Pain is a dull ache in the middle of the low back  He hasn't tried medication for this  He is comfortable at rest but anytime he lifts his legs he can feel this.      Past Medical History:   Diagnosis Date     Colon polyp 2010    Repeat colonoscopy 2015     FH: colon cancer      Hyperlipidemia LDL goal <160      Moderate major depression (H) 6/12/2013     Needle phobia      VANESSA on CPAP     could not tolerate CPAP     No past surgical history on file.  Social History     Tobacco Use     Smoking status: Never Smoker     Smokeless tobacco: Never Used   Substance Use Topics     Alcohol use: Yes     Alcohol/week: 0.0 - 1.0 standard drinks     Comment: 0-1 drinks per week     Current Outpatient Medications   Medication Sig Dispense Refill     atorvastatin (LIPITOR) 10 MG tablet Take 1 tablet (10 mg) by mouth daily 90 tablet 3     buPROPion (WELLBUTRIN XL) 300 MG 24 hr tablet TAKE 1 TABLET BY MOUTH EVERY MORNING 90 tablet 3     Cholecalciferol (VITAMIN D) 2000 UNITS CAPS Take  by mouth.       Allergies   Allergen Reactions     No Known Drug Allergy      FAMILY HISTORY NOTED AND REVIEWED      PHYSICAL EXAM:    BP (!) 146/84 (BP Location: Right arm, Cuff Size: Adult Large)   Pulse 53   Temp 97.2  F (36.2  C) (Tympanic)   Ht 1.854 m (6' 1\")   Wt 112.9 kg (249 lb)   SpO2 100%   BMI 32.85 kg/m      Patient appears non toxic  Gait is normal  FROM in spine  No lumbar spine tenderness in the  Midline  SLR neg bilat but pt has poor flexibility  No pain with int/ext rotation of the hip    Xray report:           "                                                     IMPRESSION: No acute fracture or malalignment. Mild-to-moderate  bilateral hip joint degenerative changes. Mild degenerative changes of  the sacroiliac joints and lower lumbar spine.       Assessment and Plan:     (M25.551,  M25.552) Bilateral hip pain  (primary encounter diagnosis)  Comment: suspect hip flexor tendinitis, recd a few sessions of PT.  Consider ortho ref if sxs persist  Plan: XR Pelvis and Hip Bilateral 1 View    Patient Instructions   Advil 600mg 3 times per day with food for one week.    Purchase a home blood pressure monitor. (Omron) large cuff.    Monitor readings and send the results via [employer] in 2 weeks.    Try to find the motivation to exercise.                (R03.0) Elevated blood pressure reading without diagnosis of hypertension  Comment:   Plan: monitor BP and f/u with readings as may need to start antihypertensive medication. Discussed exercise and a low salt diet.      Juhi Skelton PA-C

## 2019-11-28 DIAGNOSIS — M25.552 BILATERAL HIP PAIN: Primary | ICD-10-CM

## 2019-11-28 DIAGNOSIS — M25.551 BILATERAL HIP PAIN: Primary | ICD-10-CM

## 2019-11-28 NOTE — RESULT ENCOUNTER NOTE
"Syed,    Your xrays show \"mild to moderate degenerative changes\" of both hips (arthritis).  I am not convinced that is the cause of your pain however.  I would like you to try physical therapy and see if that helps.  If not we may need to have you see an orthopedic surgeon.    Call [phone] to schedule with the [facility]. They have locations throughout the [facility].    Juhi Skelton PA-C    "

## 2019-12-11 ENCOUNTER — THERAPY VISIT (OUTPATIENT)
Dept: PHYSICAL THERAPY | Facility: CLINIC | Age: 54
End: 2019-12-11
Attending: PHYSICIAN ASSISTANT
Payer: COMMERCIAL

## 2019-12-11 DIAGNOSIS — M25.552 BILATERAL HIP PAIN: ICD-10-CM

## 2019-12-11 DIAGNOSIS — M25.551 BILATERAL HIP PAIN: ICD-10-CM

## 2019-12-11 PROCEDURE — 97161 PT EVAL LOW COMPLEX 20 MIN: CPT | Mod: GP | Performed by: PHYSICAL THERAPIST

## 2019-12-11 PROCEDURE — 97110 THERAPEUTIC EXERCISES: CPT | Mod: GP | Performed by: PHYSICAL THERAPIST

## 2019-12-11 ASSESSMENT — ACTIVITIES OF DAILY LIVING (ADL)
GOING_DOWN_1_FLIGHT_OF_STAIRS: NO DIFFICULTY AT ALL
WALKING_INITIALLY: NO DIFFICULTY AT ALL
HEAVY_WORK: SLIGHT DIFFICULTY
RECREATIONAL_ACTIVITIES: SLIGHT DIFFICULTY
PUTTING_ON_SOCKS_AND_SHOES: SLIGHT DIFFICULTY
TWISTING/PIVOTING_ON_INVOLVED_LEG: NO DIFFICULTY AT ALL
HOW_WOULD_YOU_RATE_YOUR_CURRENT_LEVEL_OF_FUNCTION_DURING_YOUR_USUAL_ACTIVITIES_OF_DAILY_LIVING_FROM_0_TO_100_WITH_100_BEING_YOUR_LEVEL_OF_FUNCTION_PRIOR_TO_YOUR_HIP_PROBLEM_AND_0_BEING_THE_INABILITY_TO_PERFORM_ANY_OF_YOUR_USUAL_DAILY_ACTIVITIES?: 80
WALKING_UP_STEEP_HILLS: NO DIFFICULTY AT ALL
GOING_UP_1_FLIGHT_OF_STAIRS: NO DIFFICULTY AT ALL
SITTING_FOR_15_MINUTES: NO DIFFICULTY AT ALL
LIGHT_TO_MODERATE_WORK: NO DIFFICULTY AT ALL
HOS_ADL_HIGHEST_POTENTIAL_SCORE: 68
HOS_ADL_COUNT: 17
HOS_ADL_ITEM_SCORE_TOTAL: 63
GETTING_INTO_AND_OUT_OF_A_BATHTUB: SLIGHT DIFFICULTY
STANDING_FOR_15_MINUTES: NO DIFFICULTY AT ALL
WALKING_15_MINUTES_OR_GREATER: NO DIFFICULTY AT ALL
ROLLING_OVER_IN_BED: NO DIFFICULTY AT ALL
STEPPING_UP_AND_DOWN_CURBS: NO DIFFICULTY AT ALL
HOS_ADL_SCORE(%): 92.65
WALKING_APPROXIMATELY_10_MINUTES: NO DIFFICULTY AT ALL
DEEP_SQUATTING: SLIGHT DIFFICULTY
WALKING_DOWN_STEEP_HILLS: NO DIFFICULTY AT ALL
GETTING_INTO_AND_OUT_OF_AN_AVERAGE_CAR: SLIGHT DIFFICULTY

## 2019-12-11 NOTE — PROGRESS NOTES
[facility] Initial Evaluation  Subjective:  The history is provided by the patient. No  was used.   Syed Terrell being seen for hip pain that hurts mostly in the groin. The pain only occurs when he goes to lift his leg. For example, getting into his car. Walking and standing for long periods of time are not bothersome. .   Problem began 9/11/2019. Problem occurred: insidious onset  and reported as 2/10 on pain scale. General health as reported by patient is fair. Pertinent medical history includes:  High blood pressure, depression and sleep disorder/apnea.         Primary job tasks include:  Prolonged sitting.  Pain is described as aching and sharp   Since onset symptoms are unchanged. Special tests:  X-ray (Imaging revealed mild arthritis in bilateral hips).     Patient is Accounting. Restrictions include:  Working in normal job without restrictions.    Barriers include:  None as reported by patient.  Red flags:  None as reported by patient.  Type of problem:  Bilateral hips   Condition occurred with:  Insidious onset. This is a new condition    Patient reports pain:  Anterior. Radiates to:  No radiation. Associated symptoms:  Loss of strength and loss of motion/stiffness. Symptoms are exacerbated by certain positions and relieved by rest and activity/movement.                      Objective:  Standing Alignment:            Hip:  Normal        Gait:    Gait Type:  Normal   Assistive Devices:  None                 Lumbar/SI Evaluation  ROM:    AROM Lumbar:   Flexion:            Full to the floor, no pain  Ext:                    WNL, no pain   Side Bend:        Left:  To knee, no pain    Right:  To knee, no pain  Rotation:           Left:  WNL, no pain    Right:  WNL, no pain  Side Glide:        Left:     Right:             Lumbar DTR's:    L4 (Quad):  Left:  2   Right:  2      Lumbar Dermtomes:  normal (Pt denies any tingling, numbness, or burning sensations into bilateral  LEs.)                                                            Hip Evaluation  HIP AROM:    Flexion: Left: WNL    Right:  90                  Hip PROM:    Flexion: Left: WNL   Right: WNL        Internal Rotation: Left: 25    Right: 25  External Rotation: Left: 45    Right: 45      Pain: Pain prevents full AROM on the right        Hip Strength:    Flexion:   Left: 5-/5   +/-  Pain:  Right:  4/5   ++  Pain: strong/painful                      Abduction:  Left: 4+/5     Pain:Right: 4+/5    Pain:          Knee Extension:  Left: 5/5   -  Pain:Right: 5/5    +/-  Pain:        Hip Special Testing:   Special tests hip not assessed: Rick test reveals increased tightness in bilateral hip flexors but no pain.    Left hip negative for the following special tests:  Janet; Fadir/Labrum; SLR; Rob's; Rick or Femoral Nerve  Right hip negative for the following special tests:  Janet; Fadir/Labrum; SLR; Rob's; Rick or Femoral Nerve    Hip Palpation:      Left hip tenderness not present at:  Greater Trachanter; IT Band; hip flexors; ASIS or Gluteus Medius  Right hip tenderness present at:  hip flexors  Right hip tenderness not present at:  Greater Trachanter; IT Band; ASIS or Gluteus Medius  Functional Testing:  Functional test hip: Squat: No pain, full range of motion and good technique.                       General     ROS    Assessment/Plan:    Patient is a 54 year old male with both sides hip complaints.    Patient has the following significant findings with corresponding treatment plan.                Diagnosis 1:  Hip pain secondary to hip flexor tightness  Pain -  hot/cold therapy, US, electric stimulation, manual therapy, self management, education, directional preference exercise and home program  Decreased ROM/flexibility - manual therapy, therapeutic exercise and home program  Decreased joint mobility - manual therapy, therapeutic exercise and home program  Decreased strength - therapeutic exercise, therapeutic  activities and home program  Decreased function - therapeutic activities and home program    Therapy Evaluation Codes:   1) History comprised of:   Personal factors that impact the plan of care:      None.    Comorbidity factors that impact the plan of care are:      None.     Medications impacting care: None.  2) Examination of Body Systems comprised of:   Body structures and functions that impact the plan of care:      Hip.   Activity limitations that impact the plan of care are:      Driving, Dressing, Sitting and Laying down.  3) Clinical presentation characteristics are:   Stable/Uncomplicated.  4) Decision-Making    Low complexity using standardized patient assessment instrument and/or measureable assessment of functional outcome.  Cumulative Therapy Evaluation is: Low complexity.    Previous and current functional limitations:  (See Goal Flow Sheet for this information)    Short term and Long term goals: (See Goal Flow Sheet for this information)     Communication ability:  Patient appears to be able to clearly communicate and understand verbal and written communication and follow directions correctly.  Treatment Explanation - The following has been discussed with the patient:   RX ordered/plan of care  Anticipated outcomes  Possible risks and side effects  This patient would benefit from PT intervention to resume normal activities.   Rehab potential is excellent.    Frequency:  1 X week, once daily  Duration:  for 4 weeks  Discharge Plan:  Achieve all LTG.  Independent in home treatment program.  Reach maximal therapeutic benefit.    Please refer to the daily flowsheet for treatment today, total treatment time and time spent performing 1:1 timed codes.

## 2019-12-26 ENCOUNTER — MYC MEDICAL ADVICE (OUTPATIENT)
Dept: FAMILY MEDICINE | Facility: CLINIC | Age: 54
End: 2019-12-26

## 2019-12-26 NOTE — TELEPHONE ENCOUNTER
Have patient make appointment with me if he agrees   This will be new medication   Ok to add him to my schedule at 3:45 tomorrow if patient agrees  Dr.Nasima Mamadou MD

## 2019-12-26 NOTE — TELEPHONE ENCOUNTER
Juhi,    Please see [employer] message and reply to patient with advise, or route advise back to triage for follow up with patient.      Thank you,  Janay BALES RN,BSN

## 2019-12-26 NOTE — TELEPHONE ENCOUNTER
Dr. Cedillo see below response from patient, would you advise sooner visit with team?     [place] MARQUEZ RN

## 2019-12-27 ENCOUNTER — OFFICE VISIT (OUTPATIENT)
Dept: FAMILY MEDICINE | Facility: CLINIC | Age: 54
End: 2019-12-27
Payer: COMMERCIAL

## 2019-12-27 VITALS
HEART RATE: 61 BPM | TEMPERATURE: 97 F | SYSTOLIC BLOOD PRESSURE: 146 MMHG | DIASTOLIC BLOOD PRESSURE: 80 MMHG | OXYGEN SATURATION: 98 % | BODY MASS INDEX: 32.47 KG/M2 | HEIGHT: 73 IN | WEIGHT: 245 LBS

## 2019-12-27 DIAGNOSIS — R07.89 ATYPICAL CHEST PAIN: ICD-10-CM

## 2019-12-27 DIAGNOSIS — Z91.89 CARDIOVASCULAR RISK FACTOR: ICD-10-CM

## 2019-12-27 DIAGNOSIS — I10 BENIGN HYPERTENSION: Primary | ICD-10-CM

## 2019-12-27 DIAGNOSIS — G47.33 OSA ON CPAP: ICD-10-CM

## 2019-12-27 DIAGNOSIS — E78.5 HYPERLIPIDEMIA, UNSPECIFIED HYPERLIPIDEMIA TYPE: ICD-10-CM

## 2019-12-27 DIAGNOSIS — F32.1 MODERATE MAJOR DEPRESSION (H): ICD-10-CM

## 2019-12-27 LAB
ANION GAP SERPL CALCULATED.3IONS-SCNC: 5 MMOL/L (ref 3–14)
BUN SERPL-MCNC: 18 MG/DL (ref 7–30)
CALCIUM SERPL-MCNC: 8.9 MG/DL (ref 8.5–10.1)
CHLORIDE SERPL-SCNC: 108 MMOL/L (ref 94–109)
CO2 SERPL-SCNC: 27 MMOL/L (ref 20–32)
CREAT SERPL-MCNC: 1.03 MG/DL (ref 0.66–1.25)
GFR SERPL CREATININE-BSD FRML MDRD: 81 ML/MIN/{1.73_M2}
GLUCOSE SERPL-MCNC: 101 MG/DL (ref 70–99)
POTASSIUM SERPL-SCNC: 3.8 MMOL/L (ref 3.4–5.3)
SODIUM SERPL-SCNC: 140 MMOL/L (ref 133–144)

## 2019-12-27 PROCEDURE — 80048 BASIC METABOLIC PNL TOTAL CA: CPT | Performed by: INTERNAL MEDICINE

## 2019-12-27 PROCEDURE — 99214 OFFICE O/P EST MOD 30 MIN: CPT | Performed by: INTERNAL MEDICINE

## 2019-12-27 PROCEDURE — 93000 ELECTROCARDIOGRAM COMPLETE: CPT | Performed by: INTERNAL MEDICINE

## 2019-12-27 PROCEDURE — 36415 COLL VENOUS BLD VENIPUNCTURE: CPT | Performed by: INTERNAL MEDICINE

## 2019-12-27 RX ORDER — HYDROCHLOROTHIAZIDE 25 MG/1
25 TABLET ORAL DAILY
Qty: 90 TABLET | Refills: 0 | Status: SHIPPED | OUTPATIENT
Start: 2019-12-27 | End: 2020-03-02

## 2019-12-27 ASSESSMENT — MIFFLIN-ST. JEOR: SCORE: 2000.19

## 2019-12-27 NOTE — PROGRESS NOTES
"Subjective     Syed Terrell is a 54 year old male who presents to clinic today for the following health issues:    Patient is accompanied by wife    HPI     Elevated BP  Patient is here for elevated BP follow-up   He sent [employer] message about his BP readings  Based on his recordings his BP is staying on higher side  He tries to exercise regularly  Relates he typically exercises in cycles  Typically around spring/summer he is more motivated to work out  He is unsure about family history of hypertension  /81 today    Chest pain  Patient notes occasional chest pain  Pain is not always associated with exertion  Sometimes will have it after doing physical activity  Sometimes will have chest pain when sitting at desk  Pain is mild, but it is something he has never had before  Notes he has been having heart burn lately  Never had cardiac work up done before  Wondering if he should have stress test done    Fatigue  He has been tired person for most of life  Unsure what to do about this  At this point he has accepted he is \"just a tired person\"  Was started on Wellbutrin by Juhi Skelton on 02/05/2019  Has not noticed improvement since starting this  Notes his appetite has increased  He has sleep apnea  Was diagnosed few years ago  Uses cpap nightly  Though he does not put water in it or clean it  Reports he does not feel rested when he wakes up  Last sleep study was done few years ago [facility] [place] [facility]    Medications and Labs reviewed in EPIC    Reviewed and updated as needed this visit by Provider         Review of Systems   ROS COMP: Constitutional, HEENT, cardiovascular, pulmonary, GI, , musculoskeletal, neuro, skin, endocrine and psych systems are negative, except as otherwise noted.    POSITIVE for chest pain  POSITIVE for fatigue    This document serves as a record of the services and decisions personally performed and made by Carole Cedillo MD. It was created on her behalf by Tania Wayne, a trained medical " "scribe. The creation of this document is based on the provider's statements to the medical scribe.  Tania Wayne 3:45 PM December 27, 2019        Objective    BP (!) 146/80   Pulse 61   Temp 97  F (36.1  C) (Oral)   Ht 1.854 m (6' 1\")   Wt 111.1 kg (245 lb)   SpO2 98%   BMI 32.32 kg/m    Body mass index is 32.32 kg/m .  Physical Exam   GENERAL APPEARANCE: obese, alert and no distress  EYES: Eyes grossly normal to inspection, PERRL and conjunctivae and sclerae normal  HENT: ear canals and TM's normal and nose and mouth without ulcers or lesions  NECK: no adenopathy  RESP: lungs clear to auscultation - no rales, rhonchi or wheezes  CV: regular rates and rhythm, normal S1 S2, no S3  PSYCH: mentation appears normal, affect normal/bright    Diagnostic Test Results:  Labs reviewed in Epic  Results for orders placed or performed in visit on 12/27/19 (from the past 24 hour(s))   Basic metabolic panel   Result Value Ref Range    Sodium 140 133 - 144 mmol/L    Potassium 3.8 3.4 - 5.3 mmol/L    Chloride 108 94 - 109 mmol/L    Carbon Dioxide 27 20 - 32 mmol/L    Anion Gap 5 3 - 14 mmol/L    Glucose 101 (H) 70 - 99 mg/dL    Urea Nitrogen 18 7 - 30 mg/dL    Creatinine 1.03 0.66 - 1.25 mg/dL    GFR Estimate 81 >60 mL/min/[1.73_m2]    GFR Estimate If Black >90 >60 mL/min/[1.73_m2]    Calcium 8.9 8.5 - 10.1 mg/dL            EKG - sinus bradycardia, nonspecific T-abnormality, normal axis, normal intervals, no acute ST changes c/w ischemia, no LVH by voltage criteria, there are no prior tracings available     Reviewed and discussed CBC done on 02/05/2019  Reviewed and discussed CMP done on 02/05/2019  Reviewed and discussed lipids done on 02/05/2019    Assessment & Plan   Syed was seen today for hypertension.    Diagnoses and all orders for this visit:    Benign hypertension  Patient's BP has been staying on higher side  He sent [employer] message of BP recordings  He tries to exercise regularly  Notes he tends to exercise in " cycles  Spring and summer he is most motivated to work out  His family history of hypertension is unclear  BP was 151/81 today  Answered all questions patient had  Information about DASH diet provided in office today  Explained BP should stay below 140/90  Explained high BP is risk factor for CVA and TI  Explained his BP has been staying consistently high and this is indication to start medication  Explained Wellbutrin also helps with appetite suppression  BP rechecked in office and was 146/80  Start 25mg hydrochlorothiazide once daily  Monitor your blood pressure once a week at home.  Bring those readings on your next visit.  Notify us if your blood pressure readings consistently stays greater than 140/90.  Maintain healthy diet and do regular physical exercise  Watch salt intake  Can discuss weight loss medication in future if needed  -     EKG 12-lead complete w/read - Clinics  -     Basic metabolic panel  -     hydrochlorothiazide (HYDRODIURIL) 25 MG tablet; Take 1 tablet (25 mg) by mouth daily For high Blood Pressure    Atypical chest pain  Patient complains of occasional chest pain  Chest pain is mild in intensity  Never had this type of pain in past  Notes he has been having issues with heart burn  Never had cardiac work up done before  Explained acid reflux can cause chest pain  Explained he may also be physically deconditioned  Will do baseline EKG today  Will also do stress test to rule out cardiac etiology  Referral for stress test placed  He will schedule  Take OTC pepcid 20mg twice daily for acid reflux  Do physical activity slowly and gradually  -     EKG 12-lead complete w/read - Clinics    Cardiovascular risk factor  See above  -     [facility] Lexiscan stress test; Future    VANESSA on CPAP  Patient complains of fatigue  He has been fatigue for a long time  Was recently started on Wellbutrin by Juhi Skelton  Despite this he still has fatigue  Notes he has large appetite as well  Has sleep apnea and uses cpap  Last  "sleep study was years ago [facility] [place] [facility]   Does not feel rested when he wakes up  Notes he does not clean cpap at all  Explained uncontrolled sleep apnea is not good for right side of heart  Explained not cleaning cpap can increase risk of infection  Advised patient to have another sleep study done  Referral for sleep specialist placed  He will schedule  Bring cpap machine with to appointment   Keep cpap machine clean   -     SLEEP EVALUATION & MANAGEMENT REFERRAL - [facility] [place] [phone]  (Age 18 and up); Future    Hyperlipidemia, unspecified hyperlipidemia type  Stable  Compliant with medication    Moderate major depression (H)  Stable  Compliant with medication    BMI:   Estimated body mass index is 32.32 kg/m  as calculated from the following:    Height as of this encounter: 1.854 m (6' 1\").    Weight as of this encounter: 111.1 kg (245 lb).   Weight management plan: Discussed healthy diet and exercise guidelines    Patient Instructions     Labs and EKG today  Do regular physical activity and maintain healthy diet  Watch salt intake  Monitor your blood pressure once a week at home.  Bring those readings on your next visit.  Notify us if your blood pressure readings consistently stays greater than 140/90.    Schedule an appointment for stress test   Take pepcid 20mg twice daily for acid reflux  Start physical activity slowly and gradually  If start having chest pain then stop    Schedule an appointment for sleep specialists   Bring your cpap machine with you    Follow-up in 4-6 weeks with uJhi Skelton  Seek sooner medical attention if there is any worsening of symptoms or problems    The information in this document, created by the medical scribe for me, accurately reflects the services I personally performed and the decisions made by me. I have reviewed and approved this document for accuracy prior to leaving the patient care area.  December 27, 2019 4:15 PM    Carole PIEDRA" MD Mamadou  [facility]

## 2019-12-27 NOTE — PATIENT INSTRUCTIONS
Labs and EKG today  Do regular physical activity and maintain healthy diet  Watch salt intake  Monitor your blood pressure once a week at home.  Bring those readings on your next visit.  Notify us if your blood pressure readings consistently stays greater than 140/90.    Schedule an appointment for stress test   Take pepcid 20mg twice daily for acid reflux  Start physical activity slowly and gradually  If start having chest pain then stop    Schedule an appointment for sleep specialists   Bring your cpap machine with you    Follow-up in 4-6 weeks with Juhi Skelton  Seek sooner medical attention if there is any worsening of symptoms or problems    Patient Education     Lowering Your Blood Pressure with DASH  What is the DASH eating plan?  DASH (Dietary Approaches to Stop Hypertension) can help you prevent or lower high blood pressure. This eating plan provides the nutrients that help lower blood pressure: potassium, magnesium, calcium, protein and fiber.   If not controlled, high blood pressure may lead to heart disease, stroke or blindness.  This eating plan is rich in:     Fruits and vegetables    Whole grains    Fat-free or low-fat milk products    Fish and poultry (chicken, turkey, etc.)    Beans, seeds and nuts.  This eating plan is low in:     Salt and sodium    Sugar, sweets and drinks with sugar    Red meat, saturated fat and trans fat.  Lifestyle changes  Besides a healthy eating plan, other steps are needed to help control high blood pressure.     Stay at a healthy weight.    Be active for at least 30 minutes on most days.    If you drink alcohol, have no more than two drinks per day (for men) or one per day (for women).    If you take blood pressure medicine, take it as directed.  Losing weight with DASH  You can lose weight by eating fewer calories. It is best to take off pounds slowly over time. Talk to a dietitian about the best way to do this.  Staying active   To shed pounds, combine DASH with daily  exercise. Start with a 15-minute walk each day. Slowly increase the time until you reach a total of 30 minutes on most days. To avoid weight gain, try for 60 minutes each day. Check with your doctor before starting any exercise program.  Tips for less sodium    Avoid processed foods. These may include baked goods, cereals, soy sauce and even antacids.    Cook with less salt. Don't bring the saltshaker to the table.    Season food with herbs, spices, lemon, lime, vinegar, wine and salt-free seasoning blends.    Use low-sodium canned vegetables or fruits.  Tips to ease the change  Take a week or two to slowly make changes to your diet.    Add a serving of vegetables at lunch one day. The next day, add a serving at dinner as well.    Add fruit to one meal or eat it as a snack.    Work up to three servings of fat-free and low-fat milk products each day.    If you eat large portions of meat, cut back by a third at each meal. The goal is to eat 6 oz (ounces) of meat or less per day.    Serve brown rice and whole-wheat bread and pasta.    Try casseroles and stir-gamboa dishes that have less meat and more vegetables, grains or cooked dry beans.    Serve two or more meatless meals each week.    For snacks and desserts, eat foods low in fat, sodium and sugar, such as:  ? Unsalted rice cakes, nuts or seeds  ? Fresh fruits, raw vegetables and raisins  ? Fat-free, low-fat or frozen yogurt  ? Popcorn with no salt or butter.    If you have trouble digesting milk products, try lactose-free milk or take lactase pills.    If you have a nut allergy, eat seeds, beans, lentils or split peas.    Fruits, vegetables and whole grain foods are high in fiber. To avoid bloating and diarrhea (loose, watery stools), increase these foods over several weeks.  The DASH eating plan  Use this chart to plan your meals. Or take it with you when you shop for food.   Food Group Servings per Day  Serving Size Examples    1,600 Calories 2,000 Calories 2,600  Calories      Grains  (whole wheat) 6 6 to 8 10 to 11   1 slice bread    1 oz dry cereal      cup cooked rice, pasta or cereal Whole-wheat bread and rolls, whole-wheat pasta, English muffin, pablo bread, bagel, cereals, grits, oatmeal, brown rice, unsalted pretzels and popcorn   Vegetables  3 to 4 4 to 5 5 to 6   1 cup raw leafy vegetables      cup cut-up raw or cooked vegetable      cup vegetable juice Broccoli, carrots, collards, green beans, green peas, kale, lima beans, potatoes, spinach, squash, sweet potatoes, tomatoes   Fruits 4 4 to 5 5 to 6   1 medium fruit      cup dried fruit      cup fresh, frozen, or canned fruit      cup fruit juice Apples, apricots, bananas, dates, grapes, oranges, grapefruit, mangoes, melons, peaches, pineapples, raisins, strawberries, tangerines   Fat-free or   low-fat milk and milk products 2 to 3 2 to 3 3   1 cup milk or yogurt    1   oz cheese Fat-free or low-fat (1%) milk, buttermilk, cheese, regular or frozen yogurt   Lean meats, poultry and fish 3 to 6 6 or less 6   1 oz cooked meats, poultry (chicken, turkey) or fish    1 egg    2 egg whites Lean meats (trim away any fat, then broil, roast or poach); remove skin from poultry. Eggs are high in cholesterol, so limit egg yolks to four or less per week.   Nuts, seeds   and legumes 3 per week 4 to 5 per week 1 per day   ? cup (1   oz) nuts    2 Tbsp peanut butter    2 Tbsp (   oz) seeds      cup cooked legumes (dry beans and peas) Almonds, hazelnuts, mixed nuts, peanuts, walnuts, sunflower seeds, peanut butter, kidney beans, lentils, split peas   Fats and oils 2 2 to 3 3   1 tsp soft margarine    1 tsp vegetable oil    1 Tbsp chacon    2 Tbsp salad dressing Soft margarine, vegetable oil (such as canola, corn, olive or safflower), low-fat mayonnaise, light salad dressing   Sweets and   added sugars 0 5 or less per week 2 or less per day   1 Tbsp sugar, jelly or jam      cup sorbet or gelatin    1 cup lemonade Fruit-flavored gelatin,  "fruit punch, hard candy, jelly, maple syrup, sorbets and ices   A sample meal plan  This sample menu gives two sodium levels. Start with 2,300 mg of sodium (about 1 teaspoon of table salt per day). Then, try to lower your intake to 1,500 mg a day. Talk to your doctor or dietitian about how to do this.   These samples are for people who eat 2,000 calories per day. The less salt you eat, the more you may lower your blood pressure.   Menu for 2,300 mg sodium per day   Breakfast:   cup instant oatmeal, 1 mini whole-wheat bagel, 1 tablespoon peanut butter, 1 medium banana, 1 cup (8 ounces) low-fat milk.   Lunch: 1 cup cantaloupe chunks, 1 cup apple juice and one chicken breast sandwich that includes:    Two slices (3 ounces) chicken breast, no skin    Two slices whole-wheat bread    1 slice (   ounce) reduced-fat cheddar cheese    One leaf bruce lettuce    Two slices tomato    1 tablespoon low-fat mayonnaise.  Dinner: 1 cup cooked spaghetti,   cup low-salt vegetarian spaghetti sauce, 3 tablespoons Parmesan cheese;   cup corn (from frozen);   cup canned pears in juice; one spinach salad that includes:    1 cup fresh spinach leaves      cup fresh carrots, grated      cup fresh mushrooms, sliced    1 tablespoon vinegar and oil dressing.  Snacks:? cup unsalted almonds;   cup dried apricots; 1 cup fat-free fruit yogurt, no sugar added.  Reducing to 1,500 mg sodium per day  Use the same menu plan, but:    For breakfast, replace instant oatmeal with regular oatmeal and 1 teaspoon cinnamon.    For lunch, replace cheddar cheese with low-sodium Swiss cheese.  Resources on diet and health  [facility] [place]  [facility]  [address] [address]   [place] [address]   Phone: [phone]   TTY: [phone]   www.nhlbi.nih.gov   \"Aim for a Healthy Weight\"   www.nhlbi.nih.gov/health/public/heart/obesity/lose_wt/index.htm  \"Dietary Guidelines for Americans 2005\"   and \"A Healthier You\" " "  www.healthierus.gov/dietaryguidelines  For informational purposes only. Not to replace the advice of your health care provider. Adapted from \"Your Guide to Lowering Blood Pressure with DASH,\" by the National Heart, Lung and Blood [place],   NIH Publication No. , revised April 2006. Available at www.nhlbi.nih.gov/health/public/heart/hbp/dash/index.htm. Published by [employer]. [employer] 288239 - REV 09/15.  For informational purposes only. Not to replace the advice of your health care provider.  Copyright   2018 [employer]. All rights reserved.           "

## 2019-12-28 NOTE — RESULT ENCOUNTER NOTE
Stacey Lynch,    This is to inform you regarding your test result.    Basic metabolic panel which includes electrolytes and kidney fucntion is satisfactory .      Sincerely,      Dr.Nasima Mamdaou MD,FACP

## 2020-01-09 ENCOUNTER — TELEPHONE (OUTPATIENT)
Dept: FAMILY MEDICINE | Facility: CLINIC | Age: 55
End: 2020-01-09

## 2020-01-09 ENCOUNTER — HOSPITAL ENCOUNTER (OUTPATIENT)
Dept: CARDIOLOGY | Facility: CLINIC | Age: 55
Discharge: HOME | End: 2020-01-09
Attending: INTERNAL MEDICINE | Admitting: INTERNAL MEDICINE
Payer: COMMERCIAL

## 2020-01-09 VITALS
BODY MASS INDEX: 33.27 KG/M2 | SYSTOLIC BLOOD PRESSURE: 126 MMHG | HEIGHT: 73 IN | WEIGHT: 251 LBS | DIASTOLIC BLOOD PRESSURE: 72 MMHG | HEART RATE: 51 BPM | OXYGEN SATURATION: 96 %

## 2020-01-09 DIAGNOSIS — Z91.89 CARDIOVASCULAR RISK FACTOR: ICD-10-CM

## 2020-01-09 DIAGNOSIS — R94.39 ABNORMAL STRESS TEST: Primary | ICD-10-CM

## 2020-01-09 LAB
CV BLOOD PRESSURE: 51 %
NUC STRESS EJECTION FRACTION: 47 %
STRESS ANGINA INDEX: 0
STRESS ECHO POST ESTIMATED WORKLOAD: 11.6 METS
STRESS ECHO POST EXERCISE DUR MIN: 9 MIN
STRESS ECHO POST EXERCISE DUR SEC: 30 SEC
STRESS ECHO TARGET HR: 165

## 2020-01-09 PROCEDURE — 34300033 ZZH RX 343: Performed by: INTERNAL MEDICINE

## 2020-01-09 PROCEDURE — 78452 HT MUSCLE IMAGE SPECT MULT: CPT | Mod: 26 | Performed by: INTERNAL MEDICINE

## 2020-01-09 PROCEDURE — 78452 HT MUSCLE IMAGE SPECT MULT: CPT

## 2020-01-09 PROCEDURE — A9502 TC99M TETROFOSMIN: HCPCS | Performed by: INTERNAL MEDICINE

## 2020-01-09 PROCEDURE — 93016 CV STRESS TEST SUPVJ ONLY: CPT | Performed by: INTERNAL MEDICINE

## 2020-01-09 PROCEDURE — 93018 CV STRESS TEST I&R ONLY: CPT | Performed by: INTERNAL MEDICINE

## 2020-01-09 RX ORDER — ALBUTEROL SULFATE 90 UG/1
2 AEROSOL, METERED RESPIRATORY (INHALATION) EVERY 5 MIN PRN
Status: DISCONTINUED | OUTPATIENT
Start: 2020-01-09 | End: 2020-01-10 | Stop reason: HOSPADM

## 2020-01-09 RX ORDER — ACYCLOVIR 200 MG/1
0-1 CAPSULE ORAL
Status: DISCONTINUED | OUTPATIENT
Start: 2020-01-09 | End: 2020-01-10 | Stop reason: HOSPADM

## 2020-01-09 RX ORDER — REGADENOSON 0.08 MG/ML
0.4 INJECTION, SOLUTION INTRAVENOUS ONCE
Status: DISCONTINUED | OUTPATIENT
Start: 2020-01-09 | End: 2020-01-10 | Stop reason: HOSPADM

## 2020-01-09 RX ORDER — AMINOPHYLLINE 25 MG/ML
50-100 INJECTION, SOLUTION INTRAVENOUS
Status: DISCONTINUED | OUTPATIENT
Start: 2020-01-09 | End: 2020-01-10 | Stop reason: HOSPADM

## 2020-01-09 RX ADMIN — TETROFOSMIN 4.19 MCI.: 1.38 INJECTION, POWDER, LYOPHILIZED, FOR SOLUTION INTRAVENOUS at 08:29

## 2020-01-09 RX ADMIN — TETROFOSMIN 12.24 MCI.: 1.38 INJECTION, POWDER, LYOPHILIZED, FOR SOLUTION INTRAVENOUS at 10:07

## 2020-01-09 ASSESSMENT — MIFFLIN-ST. JEOR: SCORE: 2027.41

## 2020-01-09 NOTE — TELEPHONE ENCOUNTER
I spoke to the cardiology Dr. Diaz  Patient had abnormal stress test  He would need angiogram  I told cardiology office to contact the patient and schedule an appointment  I also called the patient informed him regarding test results  Answered his questions  Advised him to make an appointment with cardiology  If he does not hear from them in the next 24 to 48 hours then contact us  He can start taking 1 baby aspirin a day.  Avoid any strenuous activity until the evaluation is complete  Seek attention in case of any unusual symptoms  He is not having any chest pain at this time  Dr.Nasima Mamadou MD

## 2020-01-09 NOTE — TELEPHONE ENCOUNTER
Reason for Call:  Other call back    Detailed comments: [facility] They have been trying to reach Dr. Cedillo on the provider line, but has not been successful. Dr. Diaz from the [facility] would like to talk to Dr. Cedillo about the referral that she placed for this Pt.     Phone Number Patient can be reached at: Other phone number:  [phone]    Best Time: Before 5 pm     Can we leave a detailed message on this number? NO    Call taken on 1/9/2020 at 1:01 PM by Nohelia Cadet

## 2020-01-10 ENCOUNTER — OFFICE VISIT (OUTPATIENT)
Dept: CARDIOLOGY | Facility: CLINIC | Age: 55
End: 2020-01-10
Attending: INTERNAL MEDICINE
Payer: COMMERCIAL

## 2020-01-10 VITALS
SYSTOLIC BLOOD PRESSURE: 124 MMHG | WEIGHT: 250 LBS | DIASTOLIC BLOOD PRESSURE: 80 MMHG | HEIGHT: 73 IN | HEART RATE: 56 BPM | BODY MASS INDEX: 33.13 KG/M2

## 2020-01-10 DIAGNOSIS — R07.89 ATYPICAL CHEST PAIN: Primary | ICD-10-CM

## 2020-01-10 DIAGNOSIS — E78.5 HYPERLIPIDEMIA LDL GOAL <160: ICD-10-CM

## 2020-01-10 PROCEDURE — 99205 OFFICE O/P NEW HI 60 MIN: CPT | Performed by: INTERNAL MEDICINE

## 2020-01-10 RX ORDER — ATORVASTATIN CALCIUM 40 MG/1
40 TABLET, FILM COATED ORAL DAILY
Qty: 90 TABLET | Refills: 3 | Status: SHIPPED | OUTPATIENT
Start: 2020-01-10 | End: 2020-03-02

## 2020-01-10 ASSESSMENT — MIFFLIN-ST. JEOR: SCORE: 2022.87

## 2020-01-10 NOTE — PROGRESS NOTES
HPI and Plan:   Today I had the pleasure of seeing Syed Terrell at [facility]. He is a pleasant 54 year old patient with a past medical history of hypertension presents to the clinic in consultation for an abnormal stress test.  He is accompanied by his wife.  The patient recently presented to his primary care physician and during the conversation reported having episodes of chest tightness.  To further work-up his symptoms and nuclear exercise stress test was ordered during which the patient exercised for over 9 minutes reaching the target heart rate.  There were no significant ST changes and the patient did not develop chest pain during exercise.  However, the imaging part of the test showed 2 reversible defects, 1 in the inferior wall and the other in the mid segment of the anterior wall.  He was then asked to see a cardiologist to discuss his treatment options.    Today the patient tells me that he the episodes of chest pain  occur mostly at rest and not very often with exercise.  However, his patient his wife tells me that she has noticed the patient to have decreased exercise tolerance and also feels that he sweats a lot with mild exertion.  He bikes a lot in summer and but does not do any activity besides that feels he is deconditioned for rest of the year.  He also reports pain in bilateral groin and in the medial side of the thighs but was recently told that he has osteoarthritis of the hip.  Otherwise, he denies claudication.  No leg pain during 9 minutes of exercise on Zach protocol during stress test.    Denies orthopnea, PND, presyncope syncope, lower extremity edema or premature family history of coronary disease    Assessment and plan  1.  Abnormal stress test   2.  Chest pain  3.  Hypertension  4.  Bradycardia    The patient had an abnormal stress test.  He was able to exercise for 9 minutes which is reassuring.  However, the ejection fraction was low normal at 47% at rest  and because that there were perfusion abnormalities in more than 1 coronary distribution involving several segments I think it is reasonable to perform a coronary angiogram to further work-up his symptoms.  I will schedule him to undergo a coronary angiogram for further work-up.  Risks and benefits of left heart catheterization and coronary angiogram were discussed with the patient in detail.  I explained to the patient that the risk involved but were not limited to stroke, MI, death, emergent bypass for diagnostic angio, risk of contrast induced allergic reaction, renal dysfunction, vascular complications.  Alternatives to performing coronary angiogram were also discussed.  Patient understands and wishes to proceed with the test.  He does not have any surgery scheduled in the near foreseeable future and is aware that he would need to be on dual antiplatelet therapy if PCI is performed.      I would like to start him on a beta-blocker today but I am unable to do so given low resting heart rate.  I would also order transthoracic echocardiogram to further assess his LV function and regional wall motion.  If abnormal I will consider decreasing hydrochlorothiazide and starting an ACE inhibitor at next visit.  I will also increase the dose of Lipitor from 10 mg to 40 mg.  A fasting lipid profile along with ALT can be repeated in 3-6 months.    Plan  1.  Coronary angiogram   2.  Increase Lipitor to 40 mg daily  3.  Transthoracic echocardiogram  4.  Follow-up with an TERRY 1 week after coronary angiogram    Thank you for allowing me to participate in the care of Syed GRAYSON Xander    This note was completed in part using Dragon voice recognition software. Although reviewed after completion, some word and grammatical errors may occur.    Darrel Guevara MD  Cardiology    Orders Placed This Encounter   Procedures     Echocardiogram Complete       Orders Placed This Encounter   Medications     atorvastatin (LIPITOR) 40 MG tablet      Sig: Take 1 tablet (40 mg) by mouth daily     Dispense:  90 tablet     Refill:  3       Medications Discontinued During This Encounter   Medication Reason     atorvastatin (LIPITOR) 10 MG tablet        Encounter Diagnoses   Name Primary?     Atypical chest pain Yes     Hyperlipidemia LDL goal <160        CURRENT MEDICATIONS:  Current Outpatient Medications   Medication Sig Dispense Refill     atorvastatin (LIPITOR) 40 MG tablet Take 1 tablet (40 mg) by mouth daily 90 tablet 3     buPROPion (WELLBUTRIN XL) 300 MG 24 hr tablet TAKE 1 TABLET BY MOUTH EVERY MORNING 90 tablet 3     Cholecalciferol (VITAMIN D) 2000 UNITS CAPS Take  by mouth.       hydrochlorothiazide (HYDRODIURIL) 25 MG tablet Take 1 tablet (25 mg) by mouth daily For high Blood Pressure 90 tablet 0       ALLERGIES     Allergies   Allergen Reactions     No Known Drug Allergy        PAST MEDICAL HISTORY:  Past Medical History:   Diagnosis Date     Colon polyp 2010    Repeat colonoscopy 2015     FH: colon cancer      Hyperlipidemia LDL goal <160      Moderate major depression (H) 6/12/2013     Needle phobia      VANESSA on CPAP     could not tolerate CPAP       PAST SURGICAL HISTORY:  No past surgical history on file.    FAMILY HISTORY:  Family History   Problem Relation Age of Onset     Cancer - colorectal Maternal Grandfather 70     Gastrointestinal Disease Mother         colon polyps     C.A.D. Paternal Grandfather         MI age 65     Prostate Cancer Father 78       SOCIAL HISTORY:  Social History     Socioeconomic History     Marital status:      Spouse name: None     Number of children: None     Years of education: None     Highest education level: None   Occupational History     None   Social Needs     Financial resource strain: None     Food insecurity:     Worry: None     Inability: None     Transportation needs:     Medical: None     Non-medical: None   Tobacco Use     Smoking status: Never Smoker     Smokeless tobacco: Never Used   Substance  "and Sexual Activity     Alcohol use: Yes     Alcohol/week: 0.0 - 1.0 standard drinks     Comment: 0-1 drinks per week     Drug use: No     Sexual activity: Yes     Partners: Female   Lifestyle     Physical activity:     Days per week: None     Minutes per session: None     Stress: None   Relationships     Social connections:     Talks on phone: None     Gets together: None     Attends Scientology service: None     Active member of club or organization: None     Attends meetings of clubs or organizations: None     Relationship status: None     Intimate partner violence:     Fear of current or ex partner: None     Emotionally abused: None     Physically abused: None     Forced sexual activity: None   Other Topics Concern     None   Social History Narrative    , 2 sons ages 21 and 17       Review of Systems:  Skin:  Negative       Eyes:  Positive for glasses    ENT:  Negative      Respiratory:  Positive for dyspnea on exertion     Cardiovascular:    chest pain;Positive for    Gastroenterology: Negative      Genitourinary:  Negative      Musculoskeletal:  Negative   pain in legs  Neurologic:  Negative      Psychiatric:  Positive for depression    Heme/Lymph/Imm:  Negative      Endocrine:  Negative        Physical Exam:  Vitals: /80   Pulse 56   Ht 1.854 m (6' 1\")   Wt 113.4 kg (250 lb)   BMI 32.98 kg/m    Constitutional: awake, alert, no distress  Skin: Warm and dry to touch  Head: Normocephalic, atraumatic  Eyes: Conjunctivae and lids unremarkable, sclera white  ENT: No pallor or cyanosis  Respiratory: Normal breath sounds, clear to auscultation  Cardiac: Regular rate and rhythm, S1-S2 normal.  No murmurs gallops or rubs.   No pedal edema.   Extremities and musculoskeletal: No gross motor deficit  Neurological.  Affect normal  Psych: Alert and oriented x3    Recent Lab Results:  LIPID RESULTS:  Lab Results   Component Value Date    CHOL 189 02/05/2019    HDL 32 (L) 02/05/2019     (H) 02/05/2019    " TRIG 261 (H) 02/05/2019    CHOLHDLRATIO 7.3 (H) 09/21/2015       LIVER ENZYME RESULTS:  Lab Results   Component Value Date    AST 24 02/05/2019    ALT 52 02/05/2019       CBC RESULTS:  Lab Results   Component Value Date    WBC 6.3 02/05/2019    RBC 4.50 02/05/2019    HGB 14.6 02/05/2019    HCT 41.7 02/05/2019    MCV 93 02/05/2019    MCH 32.4 02/05/2019    MCHC 35.0 02/05/2019    RDW 12.9 02/05/2019     02/05/2019       BMP RESULTS:  Lab Results   Component Value Date     12/27/2019    POTASSIUM 3.8 12/27/2019    CHLORIDE 108 12/27/2019    CO2 27 12/27/2019    ANIONGAP 5 12/27/2019     (H) 12/27/2019    BUN 18 12/27/2019    CR 1.03 12/27/2019    GFRESTIMATED 81 12/27/2019    GFRESTBLACK >90 12/27/2019    ELEUTERIO 8.9 12/27/2019        A1C RESULTS:  No results found for: A1C    INR RESULTS:  No results found for: INR    CC  Carole Cedillo MD  [address]  [place] [address]    All medical records were reviewed in detail and discussed with the patient. Greater than 45 mins were spent with the patient, 50% of this time was spent on counseling and coordination of care.  After visit summary was printed and given to the patient.

## 2020-01-10 NOTE — LETTER
1/10/2020    Juhi Skelton PA-C  [address]  [place] [address]    RE: Syed Terrell       Dear Colleague,    I had the pleasure of seeing Syed Terrell in the [facility].    HPI and Plan:   Today I had the pleasure of seeing Syed Terrell at [facility]. He is a pleasant 54 year old patient with a past medical history of hypertension presents to the clinic in consultation for an abnormal stress test.  He is accompanied by his wife.  The patient recently presented to his primary care physician and during the conversation reported having episodes of chest tightness.  To further work-up his symptoms and nuclear exercise stress test was ordered during which the patient exercised for over 9 minutes reaching the target heart rate.  There were no significant ST changes and the patient did not develop chest pain during exercise.  However, the imaging part of the test showed 2 reversible defects, 1 in the inferior wall and the other in the mid segment of the anterior wall.  He was then asked to see a cardiologist to discuss his treatment options.    Today the patient tells me that he the episodes of chest pain  occur mostly at rest and not very often with exercise.  However, his patient his wife tells me that she has noticed the patient to have decreased exercise tolerance and also feels that he sweats a lot with mild exertion.  He bikes a lot in summer and but does not do any activity besides that feels he is deconditioned for rest of the year.  He also reports pain in bilateral groin and in the medial side of the thighs but was recently told that he has osteoarthritis of the hip.  Otherwise, he denies claudication.  No leg pain during 9 minutes of exercise on Zach protocol during stress test.    Denies orthopnea, PND, presyncope syncope, lower extremity edema or premature family history of coronary disease    Assessment and plan  1.  Abnormal stress  test   2.  Chest pain  3.  Hypertension  4.  Bradycardia    The patient had an abnormal stress test.  He was able to exercise for 9 minutes which is reassuring.  However, the ejection fraction was low normal at 47% at rest and because that there were perfusion abnormalities in more than 1 coronary distribution involving several segments I think it is reasonable to perform a coronary angiogram to further work-up his symptoms.  I will schedule him to undergo a coronary angiogram for further work-up.  Risks and benefits of left heart catheterization and coronary angiogram were discussed with the patient in detail.  I explained to the patient that the risk involved but were not limited to stroke, MI, death, emergent bypass for diagnostic angio, risk of contrast induced allergic reaction, renal dysfunction, vascular complications.  Alternatives to performing coronary angiogram were also discussed.  Patient understands and wishes to proceed with the test.  He does not have any surgery scheduled in the near foreseeable future and is aware that he would need to be on dual antiplatelet therapy if PCI is performed.      I would like to start him on a beta-blocker today but I am unable to do so given low resting heart rate.  I would also order transthoracic echocardiogram to further assess his LV function and regional wall motion.  If abnormal I will consider decreasing hydrochlorothiazide and starting an ACE inhibitor at next visit.  I will also increase the dose of Lipitor from 10 mg to 40 mg.  A fasting lipid profile along with ALT can be repeated in 3-6 months.    Plan  1.  Coronary angiogram   2.  Increase Lipitor to 40 mg daily  3.  Transthoracic echocardiogram  4.  Follow-up with an TERRY 1 week after coronary angiogram    Thank you for allowing me to participate in the care of Syed GRAYSON Terrell    This note was completed in part using Dragon voice recognition software. Although reviewed after completion, some word and  grammatical errors may occur.    Darrel Guevara MD  Cardiology    Orders Placed This Encounter   Procedures     Echocardiogram Complete       Orders Placed This Encounter   Medications     atorvastatin (LIPITOR) 40 MG tablet     Sig: Take 1 tablet (40 mg) by mouth daily     Dispense:  90 tablet     Refill:  3       Medications Discontinued During This Encounter   Medication Reason     atorvastatin (LIPITOR) 10 MG tablet        Encounter Diagnoses   Name Primary?     Atypical chest pain Yes     Hyperlipidemia LDL goal <160        CURRENT MEDICATIONS:  Current Outpatient Medications   Medication Sig Dispense Refill     atorvastatin (LIPITOR) 40 MG tablet Take 1 tablet (40 mg) by mouth daily 90 tablet 3     buPROPion (WELLBUTRIN XL) 300 MG 24 hr tablet TAKE 1 TABLET BY MOUTH EVERY MORNING 90 tablet 3     Cholecalciferol (VITAMIN D) 2000 UNITS CAPS Take  by mouth.       hydrochlorothiazide (HYDRODIURIL) 25 MG tablet Take 1 tablet (25 mg) by mouth daily For high Blood Pressure 90 tablet 0       ALLERGIES     Allergies   Allergen Reactions     No Known Drug Allergy        PAST MEDICAL HISTORY:  Past Medical History:   Diagnosis Date     Colon polyp 2010    Repeat colonoscopy 2015     FH: colon cancer      Hyperlipidemia LDL goal <160      Moderate major depression (H) 6/12/2013     Needle phobia      VANESSA on CPAP     could not tolerate CPAP       PAST SURGICAL HISTORY:  No past surgical history on file.    FAMILY HISTORY:  Family History   Problem Relation Age of Onset     Cancer - colorectal Maternal Grandfather 70     Gastrointestinal Disease Mother         colon polyps     C.A.D. Paternal Grandfather         MI age 65     Prostate Cancer Father 78       SOCIAL HISTORY:  Social History     Socioeconomic History     Marital status:      Spouse name: None     Number of children: None     Years of education: None     Highest education level: None   Occupational History     None   Social Needs     Financial resource  "strain: None     Food insecurity:     Worry: None     Inability: None     Transportation needs:     Medical: None     Non-medical: None   Tobacco Use     Smoking status: Never Smoker     Smokeless tobacco: Never Used   Substance and Sexual Activity     Alcohol use: Yes     Alcohol/week: 0.0 - 1.0 standard drinks     Comment: 0-1 drinks per week     Drug use: No     Sexual activity: Yes     Partners: Female   Lifestyle     Physical activity:     Days per week: None     Minutes per session: None     Stress: None   Relationships     Social connections:     Talks on phone: None     Gets together: None     Attends Tenriism service: None     Active member of club or organization: None     Attends meetings of clubs or organizations: None     Relationship status: None     Intimate partner violence:     Fear of current or ex partner: None     Emotionally abused: None     Physically abused: None     Forced sexual activity: None   Other Topics Concern     None   Social History Narrative    , 2 sons ages 21 and 17       Review of Systems:  Skin:  Negative       Eyes:  Positive for glasses    ENT:  Negative      Respiratory:  Positive for dyspnea on exertion     Cardiovascular:    chest pain;Positive for    Gastroenterology: Negative      Genitourinary:  Negative      Musculoskeletal:  Negative   pain in legs  Neurologic:  Negative      Psychiatric:  Positive for depression    Heme/Lymph/Imm:  Negative      Endocrine:  Negative        Physical Exam:  Vitals: /80   Pulse 56   Ht 1.854 m (6' 1\")   Wt 113.4 kg (250 lb)   BMI 32.98 kg/m     Constitutional: awake, alert, no distress  Skin: Warm and dry to touch  Head: Normocephalic, atraumatic  Eyes: Conjunctivae and lids unremarkable, sclera white  ENT: No pallor or cyanosis  Respiratory: Normal breath sounds, clear to auscultation  Cardiac: Regular rate and rhythm, S1-S2 normal.  No murmurs gallops or rubs.   No pedal edema.   Extremities and musculoskeletal: No " gross motor deficit  Neurological.  Affect normal  Psych: Alert and oriented x3    Recent Lab Results:  LIPID RESULTS:  Lab Results   Component Value Date    CHOL 189 02/05/2019    HDL 32 (L) 02/05/2019     (H) 02/05/2019    TRIG 261 (H) 02/05/2019    CHOLHDLRATIO 7.3 (H) 09/21/2015       LIVER ENZYME RESULTS:  Lab Results   Component Value Date    AST 24 02/05/2019    ALT 52 02/05/2019       CBC RESULTS:  Lab Results   Component Value Date    WBC 6.3 02/05/2019    RBC 4.50 02/05/2019    HGB 14.6 02/05/2019    HCT 41.7 02/05/2019    MCV 93 02/05/2019    MCH 32.4 02/05/2019    MCHC 35.0 02/05/2019    RDW 12.9 02/05/2019     02/05/2019       BMP RESULTS:  Lab Results   Component Value Date     12/27/2019    POTASSIUM 3.8 12/27/2019    CHLORIDE 108 12/27/2019    CO2 27 12/27/2019    ANIONGAP 5 12/27/2019     (H) 12/27/2019    BUN 18 12/27/2019    CR 1.03 12/27/2019    GFRESTIMATED 81 12/27/2019    GFRESTBLACK >90 12/27/2019    ELEUTERIO 8.9 12/27/2019        A1C RESULTS:  No results found for: A1C    INR RESULTS:  No results found for: INR    CC  Carole Cedillo MD  [address]  [place] [address]    All medical records were reviewed in detail and discussed with the patient. Greater than 45 mins were spent with the patient, 50% of this time was spent on counseling and coordination of care.  After visit summary was printed and given to the patient.      Thank you for allowing me to participate in the care of your patient.    Sincerely,     Darrel Guevara MD     [facility]

## 2020-01-12 NOTE — RESULT ENCOUNTER NOTE
This result was discussed with patient on phone on day of his test   He already saw cardiologist and is scheduled for angiogram

## 2020-01-14 ENCOUNTER — TELEPHONE (OUTPATIENT)
Dept: CARDIOLOGY | Facility: CLINIC | Age: 55
End: 2020-01-14

## 2020-01-14 RX ORDER — LIDOCAINE 40 MG/G
CREAM TOPICAL
Status: CANCELLED | OUTPATIENT
Start: 2020-01-14

## 2020-01-14 RX ORDER — SODIUM CHLORIDE 9 MG/ML
INJECTION, SOLUTION INTRAVENOUS CONTINUOUS
Status: CANCELLED | OUTPATIENT
Start: 2020-01-14

## 2020-01-14 RX ORDER — POTASSIUM CHLORIDE 1500 MG/1
20 TABLET, EXTENDED RELEASE ORAL
Status: CANCELLED | OUTPATIENT
Start: 2020-01-14

## 2020-01-14 NOTE — TELEPHONE ENCOUNTER
Spoke to patient to review angiogram scheduled 1- to arrive at 8:00 for a  10:00 procedure. Instructed patient to not eat or drink after midnight and take AM medications to include ASA. Verified patient does not  have a known contrast allergy. Verified wife will drive patient home and be available 24 hours post angiogram. Answered all patient questions and verbalized understanding. Orders placed in EPIC.

## 2020-01-17 ENCOUNTER — HOSPITAL ENCOUNTER (OUTPATIENT)
Facility: CLINIC | Age: 55
Discharge: HOME | End: 2020-01-17
Admitting: INTERNAL MEDICINE
Payer: COMMERCIAL

## 2020-01-17 ENCOUNTER — SURGERY (OUTPATIENT)
Age: 55
End: 2020-01-17
Payer: COMMERCIAL

## 2020-01-17 VITALS
SYSTOLIC BLOOD PRESSURE: 129 MMHG | OXYGEN SATURATION: 95 % | HEIGHT: 73 IN | WEIGHT: 252.8 LBS | TEMPERATURE: 98.1 F | DIASTOLIC BLOOD PRESSURE: 76 MMHG | RESPIRATION RATE: 16 BRPM | HEART RATE: 55 BPM | BODY MASS INDEX: 33.5 KG/M2

## 2020-01-17 DIAGNOSIS — R07.89 ATYPICAL CHEST PAIN: ICD-10-CM

## 2020-01-17 PROBLEM — Z98.890 STATUS POST CORONARY ANGIOGRAM: Status: ACTIVE | Noted: 2020-01-17

## 2020-01-17 LAB
ANION GAP SERPL CALCULATED.3IONS-SCNC: 5 MMOL/L (ref 3–14)
APTT PPP: 33 SEC (ref 22–37)
BUN SERPL-MCNC: 18 MG/DL (ref 7–30)
CALCIUM SERPL-MCNC: 9.1 MG/DL (ref 8.5–10.1)
CHLORIDE SERPL-SCNC: 106 MMOL/L (ref 94–109)
CO2 SERPL-SCNC: 30 MMOL/L (ref 20–32)
CREAT SERPL-MCNC: 1.13 MG/DL (ref 0.66–1.25)
ERYTHROCYTE [DISTWIDTH] IN BLOOD BY AUTOMATED COUNT: 12.2 % (ref 10–15)
GFR SERPL CREATININE-BSD FRML MDRD: 72 ML/MIN/{1.73_M2}
GLUCOSE SERPL-MCNC: 108 MG/DL (ref 70–99)
HCT VFR BLD AUTO: 41.8 % (ref 40–53)
HGB BLD-MCNC: 14.6 G/DL (ref 13.3–17.7)
INR PPP: 0.99 (ref 0.86–1.14)
MCH RBC QN AUTO: 32.1 PG (ref 26.5–33)
MCHC RBC AUTO-ENTMCNC: 34.9 G/DL (ref 31.5–36.5)
MCV RBC AUTO: 92 FL (ref 78–100)
PLATELET # BLD AUTO: 186 10E9/L (ref 150–450)
POTASSIUM SERPL-SCNC: 3.9 MMOL/L (ref 3.4–5.3)
RBC # BLD AUTO: 4.55 10E12/L (ref 4.4–5.9)
SODIUM SERPL-SCNC: 141 MMOL/L (ref 133–144)
WBC # BLD AUTO: 5.5 10E9/L (ref 4–11)

## 2020-01-17 PROCEDURE — 25000125 ZZHC RX 250: Performed by: INTERNAL MEDICINE

## 2020-01-17 PROCEDURE — C1760 CLOSURE DEV, VASC: HCPCS | Performed by: INTERNAL MEDICINE

## 2020-01-17 PROCEDURE — 85730 THROMBOPLASTIN TIME PARTIAL: CPT | Performed by: INTERNAL MEDICINE

## 2020-01-17 PROCEDURE — 36415 COLL VENOUS BLD VENIPUNCTURE: CPT

## 2020-01-17 PROCEDURE — 85027 COMPLETE CBC AUTOMATED: CPT | Performed by: INTERNAL MEDICINE

## 2020-01-17 PROCEDURE — C1894 INTRO/SHEATH, NON-LASER: HCPCS | Performed by: INTERNAL MEDICINE

## 2020-01-17 PROCEDURE — 40000852 ZZH STATISTIC HEART CATH LAB OR EP LAB

## 2020-01-17 PROCEDURE — 93010 ELECTROCARDIOGRAM REPORT: CPT | Performed by: INTERNAL MEDICINE

## 2020-01-17 PROCEDURE — 93005 ELECTROCARDIOGRAM TRACING: CPT

## 2020-01-17 PROCEDURE — 93458 L HRT ARTERY/VENTRICLE ANGIO: CPT | Mod: 26 | Performed by: INTERNAL MEDICINE

## 2020-01-17 PROCEDURE — 25000128 H RX IP 250 OP 636: Performed by: INTERNAL MEDICINE

## 2020-01-17 PROCEDURE — 40000235 ZZH STATISTIC TELEMETRY

## 2020-01-17 PROCEDURE — 25800030 ZZH RX IP 258 OP 636: Performed by: INTERNAL MEDICINE

## 2020-01-17 PROCEDURE — 85610 PROTHROMBIN TIME: CPT | Performed by: INTERNAL MEDICINE

## 2020-01-17 PROCEDURE — 27210794 ZZH OR GENERAL SUPPLY STERILE: Performed by: INTERNAL MEDICINE

## 2020-01-17 PROCEDURE — 99152 MOD SED SAME PHYS/QHP 5/>YRS: CPT | Performed by: INTERNAL MEDICINE

## 2020-01-17 PROCEDURE — 25000132 ZZH RX MED GY IP 250 OP 250 PS 637: Performed by: INTERNAL MEDICINE

## 2020-01-17 PROCEDURE — 80048 BASIC METABOLIC PNL TOTAL CA: CPT | Performed by: INTERNAL MEDICINE

## 2020-01-17 PROCEDURE — 99153 MOD SED SAME PHYS/QHP EA: CPT | Performed by: INTERNAL MEDICINE

## 2020-01-17 PROCEDURE — 25000128 H RX IP 250 OP 636

## 2020-01-17 PROCEDURE — 99152 MOD SED SAME PHYS/QHP 5/>YRS: CPT | Mod: GC | Performed by: INTERNAL MEDICINE

## 2020-01-17 PROCEDURE — 93458 L HRT ARTERY/VENTRICLE ANGIO: CPT | Performed by: INTERNAL MEDICINE

## 2020-01-17 RX ORDER — SODIUM CHLORIDE 9 MG/ML
INJECTION, SOLUTION INTRAVENOUS CONTINUOUS
Status: DISCONTINUED | OUTPATIENT
Start: 2020-01-17 | End: 2020-01-17

## 2020-01-17 RX ORDER — POTASSIUM CHLORIDE 1500 MG/1
20 TABLET, EXTENDED RELEASE ORAL
Status: COMPLETED | OUTPATIENT
Start: 2020-01-17 | End: 2020-01-17

## 2020-01-17 RX ORDER — NALOXONE HYDROCHLORIDE 0.4 MG/ML
.1-.4 INJECTION, SOLUTION INTRAMUSCULAR; INTRAVENOUS; SUBCUTANEOUS
Status: DISCONTINUED | OUTPATIENT
Start: 2020-01-17 | End: 2020-01-17 | Stop reason: HOSPADM

## 2020-01-17 RX ORDER — IOPAMIDOL 755 MG/ML
INJECTION, SOLUTION INTRAVASCULAR
Status: DISCONTINUED | OUTPATIENT
Start: 2020-01-17 | End: 2020-01-17

## 2020-01-17 RX ORDER — SODIUM CHLORIDE 9 MG/ML
INJECTION, SOLUTION INTRAVENOUS CONTINUOUS
Status: DISCONTINUED | OUTPATIENT
Start: 2020-01-17 | End: 2020-01-17 | Stop reason: HOSPADM

## 2020-01-17 RX ORDER — POTASSIUM CHLORIDE 1500 MG/1
20 TABLET, EXTENDED RELEASE ORAL
Status: DISCONTINUED | OUTPATIENT
Start: 2020-01-17 | End: 2020-01-17

## 2020-01-17 RX ORDER — FENTANYL CITRATE 50 UG/ML
INJECTION, SOLUTION INTRAMUSCULAR; INTRAVENOUS
Status: DISCONTINUED | OUTPATIENT
Start: 2020-01-17 | End: 2020-01-17

## 2020-01-17 RX ORDER — FENTANYL CITRATE 50 UG/ML
25-50 INJECTION, SOLUTION INTRAMUSCULAR; INTRAVENOUS
Status: DISCONTINUED | OUTPATIENT
Start: 2020-01-17 | End: 2020-01-17 | Stop reason: HOSPADM

## 2020-01-17 RX ORDER — NALOXONE HYDROCHLORIDE 0.4 MG/ML
.2-.4 INJECTION, SOLUTION INTRAMUSCULAR; INTRAVENOUS; SUBCUTANEOUS
Status: DISCONTINUED | OUTPATIENT
Start: 2020-01-17 | End: 2020-01-17 | Stop reason: HOSPADM

## 2020-01-17 RX ORDER — LIDOCAINE 40 MG/G
CREAM TOPICAL
Status: DISCONTINUED | OUTPATIENT
Start: 2020-01-17 | End: 2020-01-17

## 2020-01-17 RX ORDER — ACETAMINOPHEN 325 MG/1
650 TABLET ORAL EVERY 4 HOURS PRN
Status: DISCONTINUED | OUTPATIENT
Start: 2020-01-17 | End: 2020-01-17 | Stop reason: HOSPADM

## 2020-01-17 RX ORDER — ASPIRIN 81 MG/1
81 TABLET, CHEWABLE ORAL DAILY
COMMUNITY

## 2020-01-17 RX ORDER — ATROPINE SULFATE 0.1 MG/ML
0.5 INJECTION INTRAVENOUS EVERY 5 MIN PRN
Status: DISCONTINUED | OUTPATIENT
Start: 2020-01-17 | End: 2020-01-17 | Stop reason: HOSPADM

## 2020-01-17 RX ORDER — LIDOCAINE 40 MG/G
CREAM TOPICAL
Status: DISCONTINUED | OUTPATIENT
Start: 2020-01-17 | End: 2020-01-17 | Stop reason: HOSPADM

## 2020-01-17 RX ORDER — FLUMAZENIL 0.1 MG/ML
0.2 INJECTION, SOLUTION INTRAVENOUS
Status: DISCONTINUED | OUTPATIENT
Start: 2020-01-17 | End: 2020-01-17 | Stop reason: HOSPADM

## 2020-01-17 RX ORDER — ONDANSETRON 2 MG/ML
4 INJECTION INTRAMUSCULAR; INTRAVENOUS ONCE
Status: COMPLETED | OUTPATIENT
Start: 2020-01-17 | End: 2020-01-17

## 2020-01-17 RX ADMIN — FENTANYL CITRATE 50 MCG: 50 INJECTION, SOLUTION INTRAMUSCULAR; INTRAVENOUS at 10:31

## 2020-01-17 RX ADMIN — MIDAZOLAM 1 MG: 1 INJECTION INTRAMUSCULAR; INTRAVENOUS at 10:31

## 2020-01-17 RX ADMIN — ONDANSETRON 4 MG: 2 INJECTION INTRAMUSCULAR; INTRAVENOUS at 11:21

## 2020-01-17 RX ADMIN — POTASSIUM CHLORIDE 20 MEQ: 1500 TABLET, EXTENDED RELEASE ORAL at 09:22

## 2020-01-17 RX ADMIN — MIDAZOLAM 1 MG: 1 INJECTION INTRAMUSCULAR; INTRAVENOUS at 10:41

## 2020-01-17 RX ADMIN — FENTANYL CITRATE 50 MCG: 50 INJECTION, SOLUTION INTRAMUSCULAR; INTRAVENOUS at 10:44

## 2020-01-17 RX ADMIN — LIDOCAINE HYDROCHLORIDE 10 ML: 10 INJECTION, SOLUTION EPIDURAL; INFILTRATION; INTRACAUDAL; PERINEURAL at 10:46

## 2020-01-17 RX ADMIN — FENTANYL CITRATE 50 MCG: 50 INJECTION, SOLUTION INTRAMUSCULAR; INTRAVENOUS at 10:41

## 2020-01-17 RX ADMIN — MIDAZOLAM 1 MG: 1 INJECTION INTRAMUSCULAR; INTRAVENOUS at 10:44

## 2020-01-17 RX ADMIN — IOPAMIDOL 70 ML: 755 INJECTION, SOLUTION INTRAVENOUS at 10:58

## 2020-01-17 RX ADMIN — SODIUM CHLORIDE: 9 INJECTION, SOLUTION INTRAVENOUS at 08:51

## 2020-01-17 ASSESSMENT — MIFFLIN-ST. JEOR: SCORE: 2035.57

## 2020-01-17 NOTE — PLAN OF CARE
Care Suites Post-Procedure Note    Procedure: L heart cath  CS arrival time: 1115  Accompanied by: procedure RN  Concerns/abnormal assessment after procedure: none  Plan: Recovery per protocol, Bedrest x2 hours    Pt drowsy but arousable, VSS, 2L O2 applied to maintain sats above 92%. R groin site with angioseal covered with dry gauze and tegaderm, soft and dry.

## 2020-01-17 NOTE — PRE-PROCEDURE
GENERAL PRE-PROCEDURE:     Risks and benefits: Risks, benefits and alternatives were discussed    DC Plan: Appropriate discharge home plan in place for patients who are going home after procedure   Patient states understanding of procedure being performed: Yes    Patient's understanding of procedure matches consent: Yes    Procedure consent matches procedure scheduled: Yes    Appropriately NPO:  Yes  ASA Class:  Class 3- Severe systemic disease, definite functional limitations  Mallampati  :  Grade 2- soft palate, base of uvula, tonsillar pillars, and portion of posterior pharyngeal wall visible  Lungs:  Lungs clear with good breath sounds bilaterally  Heart:  Normal heart sounds and rate  History & Physical reviewed:  History and physical reviewed and no updates needed  Statement of review:  I have reviewed the lab findings, diagnostic data, medications, and the plan for sedation

## 2020-01-17 NOTE — PLAN OF CARE
Care Suites Admission Nursing Note    Reason for admission: L heart cath  CS arrival time: 0811  Accompanied by: spouse Lb  Name/phone of DC : Lb, [phone]  Medications held: none  Consent signed: Awaiting MD to consent Pt  Abnormal assessment/labs: K+ 3.9, replaced as ordered; Creat slightly elevated 1.13  If abnormal, provider notified: n/a  Education/questions answered: yes  Plan: L heart catheterization with Dr. Everett scheduled for 1000

## 2020-01-17 NOTE — PLAN OF CARE
Discharge instructions reviewed with spouse, questions answered. She denies concerns at this time.

## 2020-01-17 NOTE — PLAN OF CARE
Bedrest completed at 1315, pt up and ambulated in hallways, able to void, tolerating PO. R groin site remained stable, CMS intact.

## 2020-01-17 NOTE — PLAN OF CARE
Care Suites Discharge Nursing Note    Education/questions answered: yes  Patient DC location: home  Accompanied by: spouse   CS discharge time: 2783

## 2020-01-17 NOTE — DISCHARGE INSTRUCTIONS
Cardiac Angiogram Discharge Instructions - Femoral    After you go home:      Have an adult stay with you until tomorrow.    Drink extra fluids for 2 days.    You may resume your normal diet.    No smoking       For 24 hours - due to the sedation you received:    Relax and take it easy.    Do NOT make any important or legal decisions.    Do NOT drive or operate machines at home or at work.    Do NOT drink alcohol.    Care of Groin Puncture Site:      For the first 24 hrs - check the puncture site every 1-2 hours while awake.    For 2 days, when you cough, sneeze, laugh or move your bowels, hold your hand over the puncture site and press firmly.    Remove the bandaid after 24 hours. If there is minor oozing, apply another bandaid and remove it after 12 hours.    It is normal to have a small bruise or pea size lump at the site.    You may shower tomorrow. Do NOT take a bath, or use a hot tub or pool for at least 3 days. Do NOT scrub the site. Do not use lotion or powder near the puncture site.    Activity:            For 2 days:    No stooping or squatting    Do NOT do any heavy activity such as exercise, lifting, or straining.     No housework, yard work or any activity that make you sweat    Do NOT lift more than 10 pounds    Bleeding:      If you start bleeding from the site in your groin, lie down flat and press firmly on/above the site for 10 minutes.     Once bleeding stops, lay flat for 2 hours.     Call [facility] as soon as you can.       Call 911 right away if you have heavy bleeding or bleeding that does not stop.      Medicines:      If you have stopped any medicines, check with your provider about when to restart them.    Follow Up Appointments:      Follow up with [facility] Nurse Practitioner at [facility] of patient preference in 7-10 days.    Call the clinic if:      You have increased pain or a large or growing hard lump around the site.    The site is red, swollen, hot or tender.    Blood or  fluid is draining from the site.    You have chills or a fever greater than 101 F (38 C).    Your leg feels numb, cool or changes color.    You have hives, a rash or unusual itching.    New pain in the back or belly that you cannot control with Tylenol.    Any questions or concerns.          [facility] at [place]:    [phone] [facility] (7 days a week)

## 2020-01-20 ENCOUNTER — TELEPHONE (OUTPATIENT)
Dept: CARDIOLOGY | Facility: CLINIC | Age: 55
End: 2020-01-20

## 2020-01-20 NOTE — TELEPHONE ENCOUNTER
Patient states he has an echocardiogram on Friday and it is recommended he have a post angiogram follow up and is wondering if it is necessary?  Reviewed with patient even if he did not have an intervention it is recommended to check the site and for any additional follow up needed.  Patient verbalized understanding and agreed to plan of care.   patient has a echo on Friday and would like a follow up on 1-. Patient verbalized understanding and agreed to plan of care.

## 2020-01-22 LAB — INTERPRETATION ECG - MUSE: NORMAL

## 2020-01-24 ENCOUNTER — OFFICE VISIT (OUTPATIENT)
Dept: CARDIOLOGY | Facility: CLINIC | Age: 55
End: 2020-01-24
Payer: COMMERCIAL

## 2020-01-24 ENCOUNTER — HOSPITAL ENCOUNTER (OUTPATIENT)
Dept: CARDIOLOGY | Facility: CLINIC | Age: 55
Discharge: HOME | End: 2020-01-24
Attending: INTERNAL MEDICINE | Admitting: INTERNAL MEDICINE
Payer: COMMERCIAL

## 2020-01-24 VITALS
SYSTOLIC BLOOD PRESSURE: 131 MMHG | HEART RATE: 67 BPM | DIASTOLIC BLOOD PRESSURE: 88 MMHG | BODY MASS INDEX: 33.93 KG/M2 | WEIGHT: 256 LBS | HEIGHT: 73 IN

## 2020-01-24 DIAGNOSIS — E78.5 HYPERLIPIDEMIA LDL GOAL <160: ICD-10-CM

## 2020-01-24 DIAGNOSIS — R94.39 ABNORMAL STRESS TEST: Primary | ICD-10-CM

## 2020-01-24 DIAGNOSIS — I10 BENIGN HYPERTENSION: ICD-10-CM

## 2020-01-24 DIAGNOSIS — R07.89 ATYPICAL CHEST PAIN: ICD-10-CM

## 2020-01-24 PROCEDURE — 99214 OFFICE O/P EST MOD 30 MIN: CPT | Mod: 25 | Performed by: NURSE PRACTITIONER

## 2020-01-24 PROCEDURE — 93306 TTE W/DOPPLER COMPLETE: CPT | Mod: 26 | Performed by: INTERNAL MEDICINE

## 2020-01-24 PROCEDURE — 93306 TTE W/DOPPLER COMPLETE: CPT

## 2020-01-24 ASSESSMENT — MIFFLIN-ST. JEOR: SCORE: 2050.09

## 2020-01-24 NOTE — LETTER
1/24/2020    Juhi Skelton PA-C  [address]  [facility] [address]    RE: Syed Terrell       Dear Colleague,    I had the pleasure of seeing Syed Terrell in the [facility].    Cardiology Clinic Progress Note  Syed Terrell MRN# [id number]   YOB: 1965 Age: 54 year old     Reason For Visit:  Post angiogram follow up   Primary Cardiologist:   Dr. Guevara          History of Presenting Illness:      Syed Terrell is a pleasant 54 year old patient who carries a past medical history significant for hypertension, hyperlipidemia, and sleep apnea on CPAP.     He was last seen on 1/10/20 for evaluation of chest tightness. A follow up nuclear exercise stress test demonstrated a medium sized area of ischemia in the mid to basal inferior and inferoseptal segment of the LV and a small area of mild ischemia in the mid to basal anterior segment of the LV. He subsequently underwent a coronary angiogram that identified minimal nonocclusive CAD with normal LV systolic function.  He returns to the office today for follow up.      He is feeling well on a cardiac standpoint, denies chest pain, shortness of breath, PND, orthopnea, presyncope, syncope, edema, heart racing, or palpitations. His primary complaint is fatigue which he attributes to deconditioning. He if faithful with CPAP.     Echocardiogram today demonstrated a normal EF 55-60%, borderline dilated RV and borderline reduced RV systolic function, and no significant valvular disease.     Blood pressure is well controlled at 131/88, managed on hydrochlorothiazide.   Last BMP showed a sodium of 141, potassium 3.9, BUN 18, creatinine 1.13, and GFR 72  Hgb 14.6 and Plt 186    Last lipid profile showed a total cholesterol of 189, HDL 32, , and , on atorvastatin   BMI 33.77    Remains compliant with all medications.                   Assessment and Plan:       1. Abnormal stress test/ Chest tightness - Resolved.  Nuclear exercise stress test demonstrated a medium sized area of ischemia in the mid to basal inferior and inferoseptal segment of the LV and a small area of mild ischemia in the mid to basal anterior segment of the LV. He subsequently underwent a coronary angiogram that identified minimal nonocclusive CAD with normal LV systolic function. Echocardiogram today demonstrated a normal EF 55-60%, borderline dilated RV and borderline reduced RV systolic function, and no significant valvular disease. Continue on current medical therapy. Encouarge     2. Hypertension - Well controlled on hydrochlorothiazide.   3. Hyperlipidemia - , recently increased from 10mg to 40mg. Follow up FLP in 6 weeks with PCP                Thank you for allowing me to participate in this delightful patient's care. I have recommended he follow up with his PCP and  heart  .       Natalya Shankar, MYNOR CNP         Review of Systems:     Review of Systems:  Skin:  Negative     Eyes:  Positive for glasses  ENT:  Negative    Respiratory:  Negative    Cardiovascular:  Negative    Gastroenterology: Negative    Genitourinary:  Negative    Musculoskeletal:  Negative    Neurologic:  Negative    Psychiatric:  Negative    Heme/Lymph/Imm:  Negative    Endocrine:  Negative                Physical Exam:     GEN:  In general, this is a well nourished male in no acute distress.  HEENT:  Pupils equal, round. Sclerae nonicteric. Clear oropharynx. Mucous membranes moist.  NECK: Supple, no masses appreciated. Trachea midline. No JVD   C/V:  Regular rate and rhythm, No murmur, rub or gallop. No S3 or RV heave.   RESP: Respirations are unlabored. No use of accessory muscles. Clear to auscultation bilaterally without wheezing, rales, or rhonchi.  GI: Abdomen soft, nontender, nondistended. No HSM appreciated.   EXTREM: No LE edema. No cyanosis or clubbing.  NEURO: Alert and oriented, cooperative. No obvious focal deficits.   PSYCH: Normal affect.  SKIN: Warm and  dry. No rashes or petechiae appreciated.          Past Medical History:     Past Medical History:   Diagnosis Date     Colon polyp 2010    Repeat colonoscopy 2015     FH: colon cancer      Hyperlipidemia LDL goal <160      Hypertension      Moderate major depression (H) 6/12/2013     Needle phobia      VANESSA on CPAP     could not tolerate CPAP              Past Surgical History:     Past Surgical History:   Procedure Laterality Date     CV HEART CATHETERIZATION WITH POSSIBLE INTERVENTION N/A 1/17/2020    Procedure: Coronary Angiogram;  Surgeon: Patricia Gomez MD;  Location:  HEART CARDIAC CATH LAB              Allergies:   No known drug allergy       Data:   All laboratory data reviewed:    LAST CHOLESTEROL:  Lab Results   Component Value Date    CHOL 189 02/05/2019     Lab Results   Component Value Date    HDL 32 02/05/2019     Lab Results   Component Value Date     02/05/2019     Lab Results   Component Value Date    TRIG 261 02/05/2019     Lab Results   Component Value Date    CHOLHDLRATIO 7.3 09/21/2015       LAST BMP:  Lab Results   Component Value Date     01/17/2020      Lab Results   Component Value Date    POTASSIUM 3.9 01/17/2020     Lab Results   Component Value Date    CHLORIDE 106 01/17/2020     Lab Results   Component Value Date    ELEUTERIO 9.1 01/17/2020     Lab Results   Component Value Date    CO2 30 01/17/2020     Lab Results   Component Value Date    BUN 18 01/17/2020     Lab Results   Component Value Date    CR 1.13 01/17/2020     Lab Results   Component Value Date     01/17/2020       LAST CBC:  Lab Results   Component Value Date    WBC 5.5 01/17/2020     Lab Results   Component Value Date    RBC 4.55 01/17/2020     Lab Results   Component Value Date    HGB 14.6 01/17/2020     Lab Results   Component Value Date    HCT 41.8 01/17/2020     Lab Results   Component Value Date    MCV 92 01/17/2020     Lab Results   Component Value Date    MCH 32.1 01/17/2020     Lab Results    Component Value Date    MCHC 34.9 01/17/2020     Lab Results   Component Value Date    RDW 12.2 01/17/2020     Lab Results   Component Value Date     01/17/2020         Thank you for allowing me to participate in the care of your patient.    Sincerely,     MYNOR [facility]

## 2020-01-24 NOTE — PATIENT INSTRUCTIONS
Thanks for coming into [facility] clinic today.    Doing well on a cardiac standpoint  Reviewed results of angiogram and echocardiogram   Blood pressures well controlled on hydrochlorothiazide.   Encourage exercise, weight loss, and heart healthy diet  Continue on current medical therapy  Follow up with PCP and  Heart as needed       Please call my nurse at [phone] with any questions or concerns.    Scheduling phone number: [phone]  Reminder: Please bring in all current medications, over the counter supplements and vitamin bottles to your next appointment.        Patient Education     Heart Health Guidelines  Therapeutic Lifestyle Changes (TLC) Diet  There are many ways to improve the health of   your heart, lungs and blood vessels. One way is   to eat heart-healthy foods. The food choices you   make can be as important as the medicines   your doctor prescribes.     Limit foods that are high in saturated fat, trans fat and cholesterol. These are found in whole milk products, fatty meats, tropical oils (coconut, palm, palm kernel oils) and partially hydrogenated vegetable oils.    Limit sodium to 2,400 mg per day.    Limit cholesterol to less than 200 mg per day.    Balance the number of calories you eat with the amount of calories you use up. This will help you stay at a healthy weight.    Stay active. (Check with your doctor before starting an exercise program.)    Eat a variety of whole grains, fruits and vegetables.  Heart Healthy Food Choices  Below are examples of heart-healthy foods from every food group.   Breads, cereals, crackers, rice and pastas  Choose breads and crackers made without trans fat or hydrogenated oils.     Whole grain, whole wheat and rye breads    Whole wheat English muffins and bagels    Corn, flour and whole wheat tortillas    Oat, bran, multi-grain, corn and wheat cereals    Jas crackers, unsalted soda crackers,   whole grain crackers and rye  crackers    Whole wheat noodles    Brown rice and wild rice (cooked without salt)  Fruits and vegetables  Eat at least five servings per day (a sample serving: one small apple or   cup cooked carrots).    Fresh, frozen or canned fruit (packed in   own juice)    100% fruit juice (for more fiber, eat whole fruit)    Dried fruit    Fresh, frozen (without sauce) or canned vegetables (without salt)    Low-sodium tomato or vegetable juice  Meats and proteins   Eat no more than 6 ounces cooked meat, poultry or fish per day. Eat fish at least two times per week. One serving is 3.5 oz. or   cup flaked fish. Fatty fish are best (salmon, mackerel tuna or herring).    Lean cuts of beef or bison (eye of round, top round, sirloin), pork (tenderloin, sirloin, top loin), lamb (leg shank), and veal (shoulder, ground veal, cutlets, sirloin)    Skinless poultry (chicken and turkey)    Low-sodium veggie burgers    Low-fat, low-sodium lunch meats and hot dogs    Fresh fish    Low-sodium canned fish (packed in water)    Natural peanut butter    Nuts without salt (  cup serving)  ? Almonds, hazelnuts, pecans, walnuts, pistachios  ? Peanuts, soy nuts    Beans and lentils, cooked or low-sodium if canned    Egg whites, egg substitutes or whole eggs    Lean ground beef, ground turkey breast or ground bison    Low-fat tofu  Dairy products    Skim or 1% milk    Fat-free or low-fat yogurt    Low-fat cheese or cottage cheese (limit to one serving per day)    Calcium-enriched low-fat soymilk  Fats and oils    Olive oil, canola oil or peanut oil    Tub margarine with no trans fats or hydrogenated oils    Low-fat sandwich spread  Sweets and snacks    Low-fat cookies, animal crackers and wafers    [place] food or other low-fat cakes    Popcorn without butter or salt    Fat-free or low-fat ice cream, sherbet or frozen yogurt    Baked chips or unsalted pretzels  Look for sweets and snacks made without trans fat or hydrogenated oils.  If you eat:  Your  total fat should be:  Your saturated fat should be:    1,200 calories per day less than 40 grams  less than 9 grams    1,500 calories per day less than 50 grams  less than 12 grams    1,800 calories per day less than 60 grams  less than 14 grams    2,000 calories per day less than 67 grams  less than 15 grams    2,200 calories per day less than 73 grams  less than 17 grams      Use the table above to find out how much total fat and saturated fat you can have each day.   Keep your trans fat intake as low as possible. If you can't lower your cholesterol enough   with these guidelines, your doctor or dietitian may have you add soluble fiber   and plant stanols/sterols to your diet.   For informational purposes only. Not to replace the advice of your health care provider.   Copyright   2010 [employer]. All rights reserved. [place] 687029 - REV 09/15.  For informational purposes only. Not to replace the advice of your health care provider.  Copyright   2018 [employer]. All rights reserved.           Patient Education     Eating Heart-Healthy Foods  Eating has a big impact on your heart health. In fact, eating healthier can improve several of your heart risks at once. For instance, it helps you manage weight, cholesterol, and blood pressure. Here are ideas to help you make heart-healthy changes without giving up all the foods and flavors you love.  Getting started    Talk with your healthcare provider about eating plans, such as the DASH or Mediterranean diet. You may also be referred to a dietitian.    Change a few things at a time. Give yourself time to get used to a few eating changes before adding more.    Work to create a tasty, healthy eating plan that you can stick to for the rest of your life.    Goals for healthy eating  Below are some tips to improve your eating habits:    Limit saturated fats and trans fats. Saturated fats raise your levels of cholesterol, so keep these fats to a  minimum. They are found in foods such as fatty meats, whole milk, cheese, and palm and coconut oils. Avoid trans fats because they lower good cholesterol as well as raise bad cholesterol. Trans fats are most often found in processed foods.    Reduce sodium (salt) intake. Eating too much salt may increase your blood pressure. Limit your sodium intake to 2,300 milligrams (mg) per day (the amount in 1 teaspoon of salt), or less if your healthcare provider recommends it. Dining out less often and eating fewer processed foods are two great ways to decrease the amount of salt you consume.    Managing calories. A calorie is a unit of energy. Your body burns calories for fuel, but if you eat more calories than your body burns, the extras are stored as fat. Your healthcare provider can help you create a diet plan to manage your calories. This will likely include eating healthier foods as well as exercising regularly. To help you track your progress, keep a diary to record what you eat and how often you exercise.  Choose the right foods  Aim to make these foods staples of your diet. If you have diabetes, you may have different recommendations than what is listed here:    Fruits and vegetables provide plenty of nutrients without a lot of calories. At meals, fill half your plate with these foods. Split the other half of your plate between whole grains and lean protein.    Whole grains are high in fiber and rich in vitamins and nutrients. Good choices include whole-wheat bread, pasta, and brown rice.    Lean proteins give you nutrition with less fat. Good choices include fish, skinless chicken, and beans.    Low-fat or nonfat dairy provides nutrients without a lot of fat. Try low-fat or nonfat milk, cheese, or yogurt.    Healthy fats can be good for you in small amounts. These are unsaturated fats, such as olive oil, nuts, and fish. Try to have at least 2 servings per week of fatty fish, such as salmon, sardines, mackerel,  rainbow trout, and albacore tuna. These contain omega-3 fatty acids, which are good for your heart. Flaxseed is another source of a heart-healthy fat.  More on heart-healthy eating  Read food labels  Healthy eating starts at the grocery store. Be sure to pay attention to food labels on packaged foods. Look for products that are high in fiber and protein, and low in saturated fat, cholesterol, and sodium. Avoid products that contain trans fat. And pay close attention to serving size. For instance, if you plan to eat two servings, double all the numbers on the label.  Prepare food right  A key part of healthy cooking is cutting down on added fat and salt. Look on the internet for lower-fat, lower-sodium recipes. Also, try these tips:    Remove fat from meat and skin from poultry before cooking.    Skim fat from the surface of soups and sauces.    Broil, boil, bake, steam, grill, and microwave food without added fats.    Choose ingredients that spice up your food without adding calories, fat, or sodium. Try these items: horseradish, hot sauce, lemon, mustard, nonfat salad dressings, and vinegar. For salt-free herbs and spices, try basil, cilantro, cinnamon, pepper, and rosemary.  Date Last Reviewed: 10/1/2017    2165-6721 [employer]. [address] [place] [address]. All rights reserved. This information is not intended as a substitute for professional medical care. Always follow your healthcare professional's instructions.

## 2020-01-24 NOTE — PROGRESS NOTES
Cardiology Clinic Progress Note  Syed Terrell MRN# [id number]   YOB: 1965 Age: 54 year old     Reason For Visit:  Post angiogram follow up   Primary Cardiologist:   Dr. Guevara          History of Presenting Illness:      Syed Terrell is a pleasant 54 year old patient who carries a past medical history significant for hypertension, hyperlipidemia, and sleep apnea on CPAP.     He was last seen on 1/10/20 for evaluation of chest tightness. A follow up nuclear exercise stress test demonstrated a medium sized area of ischemia in the mid to basal inferior and inferoseptal segment of the LV and a small area of mild ischemia in the mid to basal anterior segment of the LV. He subsequently underwent a coronary angiogram that identified minimal nonocclusive CAD with normal LV systolic function.  He returns to the office today for follow up.      He is feeling well on a cardiac standpoint, denies chest pain, shortness of breath, PND, orthopnea, presyncope, syncope, edema, heart racing, or palpitations. His primary complaint is fatigue which he attributes to deconditioning. He if faithful with CPAP.     Echocardiogram today demonstrated a normal EF 55-60%, borderline dilated RV and borderline reduced RV systolic function, and no significant valvular disease.     Blood pressure is well controlled at 131/88, managed on hydrochlorothiazide.   Last BMP showed a sodium of 141, potassium 3.9, BUN 18, creatinine 1.13, and GFR 72  Hgb 14.6 and Plt 186    Last lipid profile showed a total cholesterol of 189, HDL 32, , and , on atorvastatin   BMI 33.77    Remains compliant with all medications.                   Assessment and Plan:       1. Abnormal stress test/ Chest tightness - Resolved. Nuclear exercise stress test demonstrated a medium sized area of ischemia in the mid to basal inferior and inferoseptal segment of the LV and a small area of mild ischemia in the mid to basal anterior segment of the LV.  He subsequently underwent a coronary angiogram that identified minimal nonocclusive CAD with normal LV systolic function. Echocardiogram today demonstrated a normal EF 55-60%, borderline dilated RV and borderline reduced RV systolic function, and no significant valvular disease. Continue on current medical therapy. Encouarge     2. Hypertension - Well controlled on hydrochlorothiazide.   3. Hyperlipidemia - , recently increased from 10mg to 40mg. Follow up FLP in 6 weeks with PCP                Thank you for allowing me to participate in this delightful patient's care. I have recommended he follow up with his PCP and  heart  .       Natalya Shankar, APRN CNP         Review of Systems:     Review of Systems:  Skin:  Negative     Eyes:  Positive for glasses  ENT:  Negative    Respiratory:  Negative    Cardiovascular:  Negative    Gastroenterology: Negative    Genitourinary:  Negative    Musculoskeletal:  Negative    Neurologic:  Negative    Psychiatric:  Negative    Heme/Lymph/Imm:  Negative    Endocrine:  Negative                Physical Exam:     GEN:  In general, this is a well nourished male in no acute distress.  HEENT:  Pupils equal, round. Sclerae nonicteric. Clear oropharynx. Mucous membranes moist.  NECK: Supple, no masses appreciated. Trachea midline. No JVD   C/V:  Regular rate and rhythm, No murmur, rub or gallop. No S3 or RV heave.   RESP: Respirations are unlabored. No use of accessory muscles. Clear to auscultation bilaterally without wheezing, rales, or rhonchi.  GI: Abdomen soft, nontender, nondistended. No HSM appreciated.   EXTREM: No LE edema. No cyanosis or clubbing.  NEURO: Alert and oriented, cooperative. No obvious focal deficits.   PSYCH: Normal affect.  SKIN: Warm and dry. No rashes or petechiae appreciated.          Past Medical History:     Past Medical History:   Diagnosis Date     Colon polyp 2010    Repeat colonoscopy 2015     FH: colon cancer      Hyperlipidemia LDL goal <160       Hypertension      Moderate major depression (H) 6/12/2013     Needle phobia      VANESSA on CPAP     could not tolerate CPAP              Past Surgical History:     Past Surgical History:   Procedure Laterality Date     CV HEART CATHETERIZATION WITH POSSIBLE INTERVENTION N/A 1/17/2020    Procedure: Coronary Angiogram;  Surgeon: Patricia Gomez MD;  Location:  HEART CARDIAC CATH LAB              Allergies:   No known drug allergy       Data:   All laboratory data reviewed:    LAST CHOLESTEROL:  Lab Results   Component Value Date    CHOL 189 02/05/2019     Lab Results   Component Value Date    HDL 32 02/05/2019     Lab Results   Component Value Date     02/05/2019     Lab Results   Component Value Date    TRIG 261 02/05/2019     Lab Results   Component Value Date    CHOLHDLRATIO 7.3 09/21/2015       LAST BMP:  Lab Results   Component Value Date     01/17/2020      Lab Results   Component Value Date    POTASSIUM 3.9 01/17/2020     Lab Results   Component Value Date    CHLORIDE 106 01/17/2020     Lab Results   Component Value Date    ELEUTERIO 9.1 01/17/2020     Lab Results   Component Value Date    CO2 30 01/17/2020     Lab Results   Component Value Date    BUN 18 01/17/2020     Lab Results   Component Value Date    CR 1.13 01/17/2020     Lab Results   Component Value Date     01/17/2020       LAST CBC:  Lab Results   Component Value Date    WBC 5.5 01/17/2020     Lab Results   Component Value Date    RBC 4.55 01/17/2020     Lab Results   Component Value Date    HGB 14.6 01/17/2020     Lab Results   Component Value Date    HCT 41.8 01/17/2020     Lab Results   Component Value Date    MCV 92 01/17/2020     Lab Results   Component Value Date    MCH 32.1 01/17/2020     Lab Results   Component Value Date    MCHC 34.9 01/17/2020     Lab Results   Component Value Date    RDW 12.2 01/17/2020     Lab Results   Component Value Date     01/17/2020

## 2020-02-06 ENCOUNTER — TRANSFERRED RECORDS (OUTPATIENT)
Dept: HEALTH INFORMATION MANAGEMENT | Facility: CLINIC | Age: 55
End: 2020-02-06

## 2020-02-28 DIAGNOSIS — E78.5 HYPERLIPIDEMIA LDL GOAL <160: ICD-10-CM

## 2020-02-28 DIAGNOSIS — I10 BENIGN HYPERTENSION: ICD-10-CM

## 2020-02-28 DIAGNOSIS — F33.1 MAJOR DEPRESSIVE DISORDER, RECURRENT EPISODE, MODERATE (H): ICD-10-CM

## 2020-02-29 NOTE — TELEPHONE ENCOUNTER
"atorvastatin (LIPITOR) 40 MG tablet  Last Written Prescription Date:  1/10/20  Last Fill Quantity: 90 tablet,  # refills: 3   Last office visit: 1/10/2020 with prescribing provider:  TALI Guevara   Future Office Visit:      buPROPion (WELLBUTRIN XL) 300 MG 24 hr tablet  Last Written Prescription Date:  2/05/19  Last Fill Quantity: 90 tablet,  # refills: 3   Last office visit: 12/27/2019 with prescribing provider:  Mamadou   Future Office Visit:      [facility] Follow-up to PHQ 1/4/2018 2/5/2019 11/26/2019   PHQ-9 9. Suicide Ideation past 2 weeks Not at all Not at all Not at all     No flowsheet data found.      hydrochlorothiazide (HYDRODIURIL) 25 MG tablet  Last Written Prescription Date:  12/27/19  Last Fill Quantity: 90 tablet,  # refills: 0   Last office visit: 12/27/2019 with prescribing provider:  Mamadou   Future Office Visit:      Requested Prescriptions   Pending Prescriptions Disp Refills     atorvastatin (LIPITOR) 40 MG tablet 90 tablet 3     Sig: Take 1 tablet (40 mg) by mouth daily       Statins Protocol Failed - 2/28/2020  2:06 PM        Failed - LDL on file in past 12 months     Recent Labs   Lab Test 02/05/19  1012   *             Passed - No abnormal creatine kinase in past 12 months     No lab results found.             Passed - Recent (12 mo) or future (30 days) visit within the authorizing provider's specialty     Patient has had an office visit with the authorizing provider or a provider within the authorizing providers department within the previous 12 mos or has a future within next 30 days. See \"Patient Info\" tab in inbasket, or \"Choose Columns\" in Meds & Orders section of the refill encounter.              Passed - Medication is active on med list        Passed - Patient is age 18 or older        buPROPion (WELLBUTRIN XL) 300 MG 24 hr tablet 90 tablet 3     Sig: TAKE 1 TABLET BY MOUTH EVERY MORNING       SSRIs Protocol Failed - 2/28/2020  2:06 PM        Failed - PHQ-9 score less than 5 in past 6 " "months     Please review last PHQ-9 score.           Passed - Medication is Bupropion     If the medication is Bupropion (Wellbutrin), and the patient is taking for smoking cessation; OK to refill.          Passed - Medication is active on med list        Passed - Patient is age 18 or older        Passed - Recent (6 mo) or future (30 days) visit within the authorizing provider's specialty     Patient had office visit in the last 6 months or has a visit in the next 30 days with authorizing provider or within the authorizing provider's specialty.  See \"Patient Info\" tab in inbasket, or \"Choose Columns\" in Meds & Orders section of the refill encounter.            hydrochlorothiazide (HYDRODIURIL) 25 MG tablet 90 tablet 0     Sig: Take 1 tablet (25 mg) by mouth daily For high Blood Pressure       Diuretics (Including Combos) Protocol Passed - 2/28/2020  2:06 PM        Passed - Blood pressure under 140/90 in past 12 months     BP Readings from Last 3 Encounters:   01/24/20 131/88   01/17/20 129/76   01/10/20 124/80                 Passed - Recent (12 mo) or future (30 days) visit within the authorizing provider's specialty     Patient has had an office visit with the authorizing provider or a provider within the authorizing providers department within the previous 12 mos or has a future within next 30 days. See \"Patient Info\" tab in inbasket, or \"Choose Columns\" in Meds & Orders section of the refill encounter.              Passed - Medication is active on med list        Passed - Patient is age 18 or older        Passed - Normal serum creatinine on file in past 12 months     Recent Labs   Lab Test 01/17/20  0840   CR 1.13              Passed - Normal serum potassium on file in past 12 months     Recent Labs   Lab Test 01/17/20  0840   POTASSIUM 3.9                    Passed - Normal serum sodium on file in past 12 months     Recent Labs   Lab Test 01/17/20  0840                   "

## 2020-03-02 RX ORDER — BUPROPION HYDROCHLORIDE 300 MG/1
TABLET ORAL
Qty: 90 TABLET | Refills: 0 | Status: SHIPPED | OUTPATIENT
Start: 2020-03-02 | End: 2020-06-11

## 2020-03-02 RX ORDER — HYDROCHLOROTHIAZIDE 25 MG/1
25 TABLET ORAL DAILY
Qty: 90 TABLET | Refills: 0 | Status: SHIPPED | OUTPATIENT
Start: 2020-03-02 | End: 2020-06-11

## 2020-03-02 RX ORDER — ATORVASTATIN CALCIUM 40 MG/1
40 TABLET, FILM COATED ORAL DAILY
Qty: 90 TABLET | Refills: 0 | Status: SHIPPED | OUTPATIENT
Start: 2020-03-02 | End: 2020-07-08

## 2020-03-13 PROBLEM — M25.551 BILATERAL HIP PAIN: Status: RESOLVED | Noted: 2019-12-11 | Resolved: 2020-03-13

## 2020-03-13 PROBLEM — M25.552 BILATERAL HIP PAIN: Status: RESOLVED | Noted: 2019-12-11 | Resolved: 2020-03-13

## 2020-03-22 ENCOUNTER — HEALTH MAINTENANCE LETTER (OUTPATIENT)
Age: 55
End: 2020-03-22

## 2020-06-09 DIAGNOSIS — F33.1 MAJOR DEPRESSIVE DISORDER, RECURRENT EPISODE, MODERATE (H): ICD-10-CM

## 2020-06-09 DIAGNOSIS — I10 BENIGN HYPERTENSION: ICD-10-CM

## 2020-06-11 RX ORDER — HYDROCHLOROTHIAZIDE 25 MG/1
25 TABLET ORAL DAILY
Qty: 90 TABLET | Refills: 0 | Status: SHIPPED | OUTPATIENT
Start: 2020-06-11 | End: 2020-07-08

## 2020-06-11 RX ORDER — BUPROPION HYDROCHLORIDE 300 MG/1
TABLET ORAL
Qty: 90 TABLET | Refills: 0 | Status: SHIPPED | OUTPATIENT
Start: 2020-06-11 | End: 2020-07-08

## 2020-06-11 NOTE — TELEPHONE ENCOUNTER
Routing refill request to provider for review/approval because:  Pt referred to and saw cardiology 1/24/20 and was noted to f/u with PCP prn.  Did you want to see pt?  Please authorize if appropriate.  Thanks,  Deya Gilliland RN

## 2020-07-03 VITALS — HEIGHT: 73 IN | WEIGHT: 245 LBS | BODY MASS INDEX: 32.47 KG/M2

## 2020-07-03 ASSESSMENT — MIFFLIN-ST. JEOR: SCORE: 1995.19

## 2020-07-03 NOTE — PATIENT INSTRUCTIONS
Your BMI is Body mass index is 32.32 kg/m .  Weight management is a personal decision.  If you are interested in exploring weight loss strategies, the following discussion covers the approaches that may be successful. Body mass index (BMI) is one way to tell whether you are at a healthy weight, overweight, or obese. It measures your weight in relation to your height.  A BMI of 18.5 to 24.9 is in the healthy range. A person with a BMI of 25 to 29.9 is considered overweight, and someone with a BMI of 30 or greater is considered obese. More than two-thirds of American adults are considered overweight or obese.  Being overweight or obese increases the risk for further weight gain. Excess weight may lead to heart disease and diabetes.  Creating and following plans for healthy eating and physical activity may help you improve your health.  Weight control is part of healthy lifestyle and includes exercise, emotional health, and healthy eating habits. Careful eating habits lifelong are the mainstay of weight control. Though there are significant health benefits from weight loss, long-term weight loss with diet alone may be very difficult to achieve- studies show long-term success with dietary management in less than 10% of people. Attaining a healthy weight may be especially difficult to achieve in those with severe obesity. In some cases, medications, devices and surgical management might be considered.  What can you do?  If you are overweight or obese and are interested in methods for weight loss, you should discuss this with your provider.     Consider reducing daily calorie intake by 500 calories.     Keep a food journal.     Avoiding skipping meals, consider cutting portions instead.    Diet combined with exercise helps maintain muscle while optimizing fat loss. Strength training is particularly important for building and maintaining muscle mass. Exercise helps reduce stress, increase energy, and improves fitness.  Increasing exercise without diet control, however, may not burn enough calories to loose weight.       Start walking three days a week 10-20 minutes at a time    Work towards walking thirty minutes five days a week     Eventually, increase the speed of your walking for 1-2 minutes at time    In addition, we recommend that you review healthy lifestyles and methods for weight loss available through the National Institutes of Health patient information sites:  http://win.niddk.nih.gov/publications/index.htm    And look into health and wellness programs that may be available through your health insurance provider, employer, local community center, or laly club.    Weight management plan: Patient was referred to their PCP to discuss a diet and exercise plan.

## 2020-07-06 ENCOUNTER — VIRTUAL VISIT (OUTPATIENT)
Dept: SLEEP MEDICINE | Facility: CLINIC | Age: 55
End: 2020-07-06
Attending: INTERNAL MEDICINE
Payer: COMMERCIAL

## 2020-07-06 DIAGNOSIS — G25.81 RESTLESS LEGS SYNDROME (RLS): ICD-10-CM

## 2020-07-06 DIAGNOSIS — G47.33 OSA ON CPAP: ICD-10-CM

## 2020-07-06 DIAGNOSIS — E61.1 IRON DEFICIENCY: Primary | ICD-10-CM

## 2020-07-06 PROCEDURE — 99204 OFFICE O/P NEW MOD 45 MIN: CPT | Mod: 95 | Performed by: INTERNAL MEDICINE

## 2020-07-06 NOTE — PROGRESS NOTES
"Syed Terrell is a 55 year old male who is being evaluated via a billable video visit.      The patient has been notified of following:     \"This video visit will be conducted via a call between you and your physician/provider. We have found that certain health care needs can be provided without the need for an in-person physical exam.  This service lets us provide the care you need with a video conversation.  If a prescription is necessary we can send it directly to your pharmacy.  If lab work is needed we can place an order for that and you can then stop by our lab to have the test done at a later time.    Video visits are billed at different rates depending on your insurance coverage.  Please reach out to your insurance provider with any questions.    If during the course of the call the physician/provider feels a video visit is not appropriate, you will not be charged for this service.\"    Patient has given verbal consent for Video visit? Yes  How would you like to obtain your AVS? MyChart    Will anyone else be joining your video visit? No        Ingrid Leo MA on 7/3/2020 at 12:13 PM  Video-Visit Details    Type of service:  Video Visit    Video Start Time: 8:45 AM  Video End Time: 9:15 AM    Originating Location (pt. Location): Home    Distant Location (provider location):  [facility]     Platform used for Video Visit: Miguel Long MD, MD      Sleep Consultation:    Date on this visit: 7/6/2020    Syed Terrell is sent by Carole Cedillo for a sleep consultation regarding sleep apmea.    Primary Physician: Juhi Skelton     Chief complaint: sleep apnea, non restorative sleep    Presenting History:     Syed Terrell reports non-restorative sleep and daytime fatigue for more than 10 years.      His medical history is significant for HTN, GERD and depression.     He had previous sleep evaluation in 2010 through [facility]. PSG on 8/12/2010 showed an AHI of 5.3 " and RDI of 19. PLM index was 10.9. REM latency was 75.5 minutes and TST was 384.5 mins.     He was prescribed CPAP therapy and is currently on auto PAP therapy.     Overall, he rates the experience with PAP as 8 (0 poor, 10 great). The mask is comfortable. The mask is not leaking.  He is not snoring with the mask on. He is not having gasp arousals.  He is not having significant oral/nasal dryness. The pressure settings are comfortable.     He does not feel rested in the morning.    Respironics    Auto-PAP 7-12 cmH2O download:  30/30 total days of use. 0 nonuse days.  Average use 8 hours 12 minutes per day.  100% days with >4 hours use.  Large leak 1 mins per day average. CPAP 90% pressure 8.8cm. AHI 2.4      Syed goes to sleep at 10:00 PM during the week. He wakes up at 6:00 AM without an alarm. He falls asleep in 10 minutes.  Syed denies difficulty falling asleep.  He wakes up 1 times a night for 10 minutes before falling back to sleep.  Once a week, he may have difficulty returning to sleep due to an active mind. Syed wakes up to go to the bathroom and uncertain reasons.  On weekends, Syed goes to sleep at 10:00 PM.  He wakes up at 6:00 AM without an alarm. He falls asleep in 10 minutes.  Patient gets an average of 8 hours of sleep per night.     He denies no morning headaches. Syed denies any bruxism, sleep walking, sleep talking, dream enactment, sleep paralysis, cataplexy and hypnogogic/hypnopompic hallucinations.     Patient has mild restless leg symptoms at bedtime.     Patient's [place] Sleepiness score 5/24 consistent with no daytime sleepiness.      Syed naps 0 times per week . He takes no inadvertant naps.  He denies closing eyes, dozing and falling asleep while driving. Patient was counseled on the importance of driving while alert, to pull over if drowsy, or nap before getting into the vehicle if sleepy.      He uses 1 cups/day of coffee, 2 sodas/day. Last caffeine intake is usually before 2  pm.    Allergies:    Allergies   Allergen Reactions     No Known Drug Allergy        Medications:    Current Outpatient Medications   Medication Sig Dispense Refill     aspirin (ASA) 81 MG chewable tablet Take 81 mg by mouth daily       atorvastatin (LIPITOR) 40 MG tablet Take 1 tablet (40 mg) by mouth daily 90 tablet 0     buPROPion (WELLBUTRIN XL) 300 MG 24 hr tablet TAKE 1 TABLET BY MOUTH EVERY MORNING 90 tablet 0     Cholecalciferol (VITAMIN D) 2000 UNITS CAPS Take by mouth five times a week        hydrochlorothiazide (HYDRODIURIL) 25 MG tablet Take 1 tablet (25 mg) by mouth daily For high Blood Pressure 90 tablet 0       Problem List:  Patient Active Problem List    Diagnosis Date Noted     Abnormal stress test 01/24/2020     Priority: Medium     Status post coronary angiogram 01/17/2020     Priority: Medium     Atypical chest pain 01/10/2020     Priority: Medium     Added automatically from request for surgery [id number]       Benign hypertension 12/27/2019     Priority: Medium     Needle phobia      Priority: Medium     Psoriasis 09/24/2015     Priority: Medium     Elevated blood pressure 09/24/2015     Priority: Medium     Hyperlipidemia LDL goal <160      Priority: Medium     VANESSA on CPAP      Priority: Medium     Moderate major depression (H) 06/12/2013     Priority: Medium     GERD (gastroesophageal reflux disease) 07/05/2012     Priority: Medium     CARDIOVASCULAR SCREENING; LDL GOAL LESS THAN 160 06/27/2012     Priority: Medium     Colon polyp      Priority: Medium     Repeat colonoscopy 2015          Past Medical/Surgical History:  Past Medical History:   Diagnosis Date     Colon polyp 2010    Repeat colonoscopy 2015     FH: colon cancer      Hyperlipidemia LDL goal <160      Hypertension      Moderate major depression (H) 6/12/2013     Needle phobia      VANESSA on CPAP     could not tolerate CPAP     Past Surgical History:   Procedure Laterality Date     CV HEART CATHETERIZATION WITH POSSIBLE INTERVENTION  N/A 1/17/2020    Procedure: Coronary Angiogram;  Surgeon: Patricia Gomze MD;  Location:  HEART CARDI[facility] CATH LAB       Social History:  Social History     Socioeconomic History     Marital status:      Spouse name: Not on file     Number of children: Not on file     Years of education: Not on file     Highest education level: Not on file   Occupational History     Not on file   Social Needs     Financial resource strain: Not on file     Food insecurity     Worry: Not on file     Inability: Not on file     Transportation needs     Medical: Not on file     Non-medical: Not on file   Tobacco Use     Smoking status: Never Smoker     Smokeless tobacco: Never Used   Substance and Sexual Activity     Alcohol use: Yes     Alcohol/week: 0.0 - 1.0 standard drinks     Comment: 2 drinks per week     Drug use: No     Sexual activity: Yes     Partners: Female   Lifestyle     Physical activity     Days per week: Not on file     Minutes per session: Not on file     Stress: Not on file   Relationships     Social connections     Talks on phone: Not on file     Gets together: Not on file     Attends [employer] service: Not on file     Active member of club or organization: Not on file     Attends meetings of clubs or organizations: Not on file     Relationship status: Not on file     Intimate partner violence     Fear of current or ex partner: Not on file     Emotionally abused: Not on file     Physically abused: Not on file     Forced sexual activity: Not on file   Other Topics Concern     Parent/sibling w/ CABG, MI or angioplasty before 65F 55M? Not Asked   Social History Narrative    , 2 sons ages 21 and 17       Family History:  Family History   Problem Relation Age of Onset     Cancer - colorectal Maternal Grandfather 70     Gastrointestinal Disease Mother         colon polyps     C.A.D. Paternal Grandfather         MI age 65     Prostate Cancer Father 78       Review of Systems:  A complete review of  "systems reviewed by me is negative with the exeption of what has been mentioned in the history of present illness.  CONSTITUTIONAL:  POSITIVE for  weight gain  EYES: NEGATIVE for changes in vision, blind spots, double vision.  ENT: NEGATIVE for ear pain, sore throat, sinus pain, post-nasal drip, runny nose, bloody nose  CARDIAC: NEGATIVE for fast heartbeats or fluttering in chest, chest pain or pressure, breathlessness when lying flat, swollen legs or swollen feet.  NEUROLOGIC: NEGATIVE headaches, weakness or numbness in the arms or legs.  DERMATOLOGIC: NEGATIVE for rashes, new moles or change in mole(s)  PULMONARY: NEGATIVE SOB at rest, SOB with activity, dry cough, productive cough, coughing up blood, wheezing or whistling when breathing.    GASTROINTESTINAL: NEGATIVE for nausea or vomitting, loose or watery stools, fat or grease in stools, constipation, abdominal pain, bowel movements black in color or blood noted.  GENITOURINARY: NEGATIVE for pain during urination, blood in urine, urinating more frequently than usual, irregular menstrual periods.  MUSCULOSKELETAL: NEGATIVE for muscle pain, bone or joint pain, swollen joints.  ENDOCRINE: NEGATIVE for increased thirst or urination, diabetes.  LYMPHATIC: NEGATIVE for swollen lymph nodes, lumps or bumps in the breasts or nipple discharge.    Physical Examination:  Vitals: Ht 1.854 m (6' 1\")   Wt 111.1 kg (245 lb)   BMI 32.32 kg/m    BMI= Body mass index is 32.32 kg/m .         Jber Total Score 7/3/2020   Total score - Jber 5       LAUREN Total Score: 11 (07/03/20 1200)    GENERAL APPEARANCE: healthy, alert and no distress      Impression/Plan:    1. Obstructive sleep apnea    - Patient has mild obstructive sleep apnea at baseline which is being treated with auto PAP therapy. Patient complains of daytime fatigue despite regular compliance with therapy. I reviewed data from his CPAP deviec which shows regular complianec and normal AHI indicating adequate " treatement.     - Because of his compliant of non restorative sleep, we can consider a titration PSG for a more thorough assessment. We are not able to do titration PSG during the pandemic. Considering normal AHI on CPAP download, I am not sure if untreated sleep disordered breathing contributes to fatigue.     - A central disorder of hypersomnia seems less possible but a titration PSG and MSLT can objectively assess for sleepiness . Empirical treatment of fatigue with a stimulant like Modafinil is a consideration. Patient doesnot want stimulant therapy.     Plan:     1. Continue auto PAP therapy 7-12 cm H2O      2. Restless leg syndrome     - Patient has mild restless leg symptoms at bedtime. Pharmacotherapy options were reviewed. Considering mild symptomatic burden, patient declines treatment,.     - I advised checking a serum Ferritin to assess for iron deficiency, Oral iron replacement can be considered if Ferritin is below 50.     CPAP download reviewed.   Obstructive sleep apnea reviewed.  Complications of untreated sleep apnea were reviewed.    I spent a total of 30 minutes with patient with more than 50% in counseling     Dr. Dhiraj Long     CC: Carole Cedillo

## 2020-07-08 ENCOUNTER — OFFICE VISIT (OUTPATIENT)
Dept: FAMILY MEDICINE | Facility: CLINIC | Age: 55
End: 2020-07-08
Payer: COMMERCIAL

## 2020-07-08 VITALS
HEIGHT: 73 IN | HEART RATE: 50 BPM | DIASTOLIC BLOOD PRESSURE: 79 MMHG | OXYGEN SATURATION: 97 % | WEIGHT: 246 LBS | BODY MASS INDEX: 32.6 KG/M2 | TEMPERATURE: 97.5 F | SYSTOLIC BLOOD PRESSURE: 125 MMHG

## 2020-07-08 DIAGNOSIS — E61.1 IRON DEFICIENCY: ICD-10-CM

## 2020-07-08 DIAGNOSIS — G25.81 RESTLESS LEGS SYNDROME (RLS): ICD-10-CM

## 2020-07-08 DIAGNOSIS — E78.5 HYPERLIPIDEMIA LDL GOAL <130: ICD-10-CM

## 2020-07-08 DIAGNOSIS — I10 BENIGN HYPERTENSION: ICD-10-CM

## 2020-07-08 DIAGNOSIS — F32.1 MODERATE MAJOR DEPRESSION (H): Primary | ICD-10-CM

## 2020-07-08 DIAGNOSIS — Z86.0100 HISTORY OF COLONIC POLYPS: ICD-10-CM

## 2020-07-08 DIAGNOSIS — M16.0 PRIMARY OSTEOARTHRITIS OF BOTH HIPS: ICD-10-CM

## 2020-07-08 DIAGNOSIS — E78.5 HYPERLIPIDEMIA LDL GOAL <160: ICD-10-CM

## 2020-07-08 PROCEDURE — 99214 OFFICE O/P EST MOD 30 MIN: CPT | Performed by: PHYSICIAN ASSISTANT

## 2020-07-08 PROCEDURE — 82550 ASSAY OF CK (CPK): CPT | Performed by: PHYSICIAN ASSISTANT

## 2020-07-08 PROCEDURE — 36415 COLL VENOUS BLD VENIPUNCTURE: CPT | Performed by: PHYSICIAN ASSISTANT

## 2020-07-08 PROCEDURE — 80061 LIPID PANEL: CPT | Performed by: PHYSICIAN ASSISTANT

## 2020-07-08 PROCEDURE — 82728 ASSAY OF FERRITIN: CPT | Performed by: INTERNAL MEDICINE

## 2020-07-08 PROCEDURE — 80053 COMPREHEN METABOLIC PANEL: CPT | Performed by: PHYSICIAN ASSISTANT

## 2020-07-08 RX ORDER — ATORVASTATIN CALCIUM 40 MG/1
40 TABLET, FILM COATED ORAL DAILY
Qty: 90 TABLET | Refills: 3 | Status: SHIPPED | OUTPATIENT
Start: 2020-07-08 | End: 2020-10-28

## 2020-07-08 RX ORDER — BUPROPION HYDROCHLORIDE 300 MG/1
TABLET ORAL
Qty: 90 TABLET | Refills: 3 | Status: SHIPPED | OUTPATIENT
Start: 2020-07-08 | End: 2020-10-28

## 2020-07-08 RX ORDER — HYDROCHLOROTHIAZIDE 25 MG/1
25 TABLET ORAL DAILY
Qty: 90 TABLET | Refills: 3 | Status: SHIPPED | OUTPATIENT
Start: 2020-07-08 | End: 2020-10-28

## 2020-07-08 ASSESSMENT — MIFFLIN-ST. JEOR: SCORE: 1999.73

## 2020-07-08 ASSESSMENT — PATIENT HEALTH QUESTIONNAIRE - PHQ9: SUM OF ALL RESPONSES TO PHQ QUESTIONS 1-9: 5

## 2020-07-08 NOTE — PROGRESS NOTES
"Subjective     Syed Terrell is a 55 year old male who presents to clinic today for the following health issues:    HPI       Chief Complaint   Patient presents with     Medication Follow-up      buPROPion (WELLBUTRIN XL) 300 MG 24 hr tablet and hydrochlorothiazide (HYDRODIURIL) 25 MG tablet      HTN: he stopped checking his BP   Depression: would like to cont wellbutrin at current dose.  Declines need for counseling  Lipids: he is compliant with taking lipitor and is fasting for labs today    Reviewed and updated as needed this visit by Provider         Review of Systems   Constitutional, HEENT, cardiovascular, pulmonary, gi and gu systems are negative, except as otherwise noted.      Objective    /79 (BP Location: Right arm, Patient Position: Sitting, Cuff Size: Adult Large)   Pulse 50   Temp 97.5  F (36.4  C) (Temporal)   Ht 1.854 m (6' 1\")   Wt 111.6 kg (246 lb)   SpO2 97%   BMI 32.46 kg/m    Body mass index is 32.46 kg/m .  Physical Exam   GENERAL: healthy, alert and no distress  RESP: lungs clear to auscultation - no rales, rhonchi or wheezes  CV: regular rate and rhythm, normal S1 S2, no S3 or S4, no murmur, click or rub, no peripheral edema and peripheral pulses strong  NEURO: Normal strength and tone, mentation intact and speech normal  PSYCH: mentation appears normal, affect normal/bright          Recent Labs   Lab Test 02/05/19  1012 01/04/18  0953  09/21/15  0823 10/08/13  0901   CHOL 189 191   < > 257* 221*   HDL 32* 38*   < > 35* 37*   * 117*   < > 161* 160*   TRIG 261* 180*   < > 307* 118   CHOLHDLRATIO  --   --   --  7.3* 5.9*    < > = values in this interval not displayed.     Assessment and Plan:     (F32.1) Moderate major depression (H)  (primary encounter diagnosis)  Comment: stable on wellbutrin  Plan: cont same. Declines any need for counseling.    (I10) Benign hypertension  Comment: well controlled, cont same.  Plan: Comprehensive metabolic panel,         " hydrochlorothiazide (HYDRODIURIL) 25 MG tablet            (E78.5) Hyperlipidemia LDL goal <130  Comment:   Plan: Lipid panel reflex to direct LDL Fasting            (M16.0) Primary osteoarthritis of both hips  Comment: he has bilat groin and upper thigh pain for over a year.  Plan: CK total, ORTHOPEDICS PEDS REFERRAL, see Dr. Arshad            (E78.5) Hyperlipidemia LDL goal <160  Comment:   Plan: atorvastatin (LIPITOR) 40 MG tablet        refilled    (Z86.010) History of colonic polyps  Comment:   Plan: GASTROENTEROLOGY ADULT REF PROCEDURE ONLY            (G25.81) Restless legs syndrome (RLS)  Comment:   Plan: ferritin ordered by his sleep doc.      Juhi Skelton PA-C

## 2020-07-10 LAB
ALBUMIN SERPL-MCNC: 4.3 G/DL (ref 3.4–5)
ALP SERPL-CCNC: 84 U/L (ref 40–150)
ALT SERPL W P-5'-P-CCNC: 57 U/L (ref 0–70)
ANION GAP SERPL CALCULATED.3IONS-SCNC: 5 MMOL/L (ref 3–14)
AST SERPL W P-5'-P-CCNC: 23 U/L (ref 0–45)
BILIRUB SERPL-MCNC: 1.1 MG/DL (ref 0.2–1.3)
BUN SERPL-MCNC: 20 MG/DL (ref 7–30)
CALCIUM SERPL-MCNC: 9 MG/DL (ref 8.5–10.1)
CHLORIDE SERPL-SCNC: 105 MMOL/L (ref 94–109)
CHOLEST SERPL-MCNC: 151 MG/DL
CK SERPL-CCNC: 125 U/L (ref 30–300)
CO2 SERPL-SCNC: 27 MMOL/L (ref 20–32)
CREAT SERPL-MCNC: 1.16 MG/DL (ref 0.66–1.25)
FERRITIN SERPL-MCNC: 224 NG/ML (ref 26–388)
GFR SERPL CREATININE-BSD FRML MDRD: 70 ML/MIN/{1.73_M2}
GLUCOSE SERPL-MCNC: 78 MG/DL (ref 70–99)
HDLC SERPL-MCNC: 30 MG/DL
LDLC SERPL CALC-MCNC: 74 MG/DL
NONHDLC SERPL-MCNC: 121 MG/DL
POTASSIUM SERPL-SCNC: 3.8 MMOL/L (ref 3.4–5.3)
PROT SERPL-MCNC: 7.9 G/DL (ref 6.8–8.8)
SODIUM SERPL-SCNC: 137 MMOL/L (ref 133–144)
TRIGL SERPL-MCNC: 236 MG/DL

## 2020-07-15 NOTE — RESULT ENCOUNTER NOTE
Syed,    Your cholesterol profile shows a normal total cholesterol and normal LDL (bad cholesterol).  The HDL (good cholesterol) is low and exercise can help bring that up.  Your triglycerides are high which is not unusual for you.   Cutting down on sweets and carbohydrates can help lower these.    The CK is the muscle enzyme test that was done due to your leg pain and this was normal.  To rule out that your pain is caused by your lipitor, you would need to stop this for one month.  If that doesn't help, then please see the orthopedist as we discussed.    Your blood sugar, electrolytes, kidney and liver function tests were normal.    Let me know if you have any questions.    Juhi Skelton PA-C

## 2020-07-17 ENCOUNTER — TRANSFERRED RECORDS (OUTPATIENT)
Dept: HEALTH INFORMATION MANAGEMENT | Facility: CLINIC | Age: 55
End: 2020-07-17

## 2020-07-27 ENCOUNTER — TRANSFERRED RECORDS (OUTPATIENT)
Dept: HEALTH INFORMATION MANAGEMENT | Facility: CLINIC | Age: 55
End: 2020-07-27
Payer: COMMERCIAL

## 2020-09-23 ENCOUNTER — TRANSFERRED RECORDS (OUTPATIENT)
Dept: HEALTH INFORMATION MANAGEMENT | Facility: CLINIC | Age: 55
End: 2020-09-23

## 2020-10-26 DIAGNOSIS — F33.1 MAJOR DEPRESSIVE DISORDER, RECURRENT EPISODE, MODERATE (H): Primary | ICD-10-CM

## 2020-10-26 DIAGNOSIS — E78.5 HYPERLIPIDEMIA LDL GOAL <160: ICD-10-CM

## 2020-10-26 DIAGNOSIS — I10 BENIGN HYPERTENSION: ICD-10-CM

## 2020-10-28 RX ORDER — ATORVASTATIN CALCIUM 40 MG/1
40 TABLET, FILM COATED ORAL DAILY
Qty: 90 TABLET | Refills: 2 | Status: SHIPPED | OUTPATIENT
Start: 2020-10-28 | End: 2021-05-25

## 2020-10-28 RX ORDER — HYDROCHLOROTHIAZIDE 25 MG/1
25 TABLET ORAL DAILY
Qty: 90 TABLET | Refills: 2 | Status: SHIPPED | OUTPATIENT
Start: 2020-10-28 | End: 2021-05-25

## 2020-10-28 RX ORDER — BUPROPION HYDROCHLORIDE 300 MG/1
TABLET ORAL
Qty: 90 TABLET | Refills: 2 | Status: SHIPPED | OUTPATIENT
Start: 2020-10-28 | End: 2021-05-17

## 2020-10-28 NOTE — TELEPHONE ENCOUNTER
Pt is switching to mail order pharmacy     Prescription approved per [facility] Refill Protocol.  Cele LE RN

## 2021-05-09 ENCOUNTER — TRANSFERRED RECORDS (OUTPATIENT)
Dept: HEALTH INFORMATION MANAGEMENT | Facility: CLINIC | Age: 56
End: 2021-05-09

## 2021-05-16 ENCOUNTER — HEALTH MAINTENANCE LETTER (OUTPATIENT)
Age: 56
End: 2021-05-16

## 2021-05-24 NOTE — PROGRESS NOTES
SUBJECTIVE:   CC: Syed Terrell is an 56 year old male who presents for preventative health visit.     He was out of wellbutrin for about 10 days when having trouble getting his prescription.  He didn't notice much of a difference.  He is still feels tired which has been the way he has felt his entire life.  He has some persistent feelings of depression.    His CPAP machine is whistling so will try to get that fixed.  He is compliant with wearing it.    His     Patient has been advised of split billing requirements and indicates understanding: Yes  Healthy Habits:     Getting at least 3 servings of Calcium per day:  Yes    Bi-annual eye exam:  NO    Dental care twice a year:  Yes    Sleep apnea or symptoms of sleep apnea:  Daytime drowsiness, Excessive snoring and Sleep apnea    Diet:  Regular (no restrictions)    Frequency of exercise:  1 day/week    Duration of exercise:  Less than 15 minutes    Taking medications regularly:  Yes    Medication side effects:  None    PHQ-2 Total Score: 1    Additional concerns today:  No      Today's PHQ-2 Score:   PHQ-2 ( 1999 Pfizer) 5/25/2021   Q1: Little interest or pleasure in doing things 0   Q2: Feeling down, depressed or hopeless 0   PHQ-2 Score 0   Q1: Little interest or pleasure in doing things -   Q2: Feeling down, depressed or hopeless -   PHQ-2 Score -       Abuse: Current or Past(Physical, Sexual or Emotional)- No  Do you feel safe in your environment? Yes    Have you ever done Advance Care Planning? (For example, a Health Directive, POLST, or a discussion with a medical provider or your loved ones about your wishes): Yes, patient states has an Advance Care Planning document and will bring a copy to the clinic.    Social History     Tobacco Use     Smoking status: Never Smoker     Smokeless tobacco: Never Used   Substance Use Topics     Alcohol use: Yes     Alcohol/week: 0.0 - 1.0 standard drinks     Comment: 2 drinks per week     If you drink alcohol do you  typically have >3 drinks per day or >7 drinks per week? No    No flowsheet data found.    Last PSA:   PSA   Date Value Ref Range Status   02/05/2019 0.71 0 - 4 ug/L Final     Comment:     Assay Method:  Chemiluminescence using Siemens Vista analyzer       Reviewed orders with patient. Reviewed health maintenance and updated orders accordingly - Yes      Reviewed and updated as needed this visit by clinical staff  Tobacco  Allergies  Meds              Reviewed and updated as needed this visit by Provider                Past Medical History:   Diagnosis Date     Colon polyp 2010    Repeat colonoscopy 2015     FH: colon cancer      Hyperlipidemia LDL goal <160      Hypertension      Moderate major depression (H) 6/12/2013     Needle phobia      VANESSA on CPAP     could not tolerate CPAP     Past Surgical History:   Procedure Laterality Date     CV HEART CATHETERIZATION WITH POSSIBLE INTERVENTION N/A 1/17/2020    Procedure: Coronary Angiogram;  Surgeon: Patricia Gomez MD;  Location: [facility] CARDIAC CATH LAB     Current Outpatient Medications   Medication Sig Dispense Refill     aspirin (ASA) 81 MG chewable tablet Take 81 mg by mouth daily       atorvastatin (LIPITOR) 40 MG tablet Take 1 tablet (40 mg) by mouth daily 90 tablet 3     buPROPion (WELLBUTRIN XL) 300 MG 24 hr tablet TAKE 1 TABLET BY MOUTH EVERY MORNING 90 tablet 1     Cholecalciferol (VITAMIN D) 2000 UNITS CAPS Take by mouth five times a week        FLUoxetine (PROZAC) 10 MG tablet Take 1 tablet (10 mg) by mouth daily 90 tablet 1     hydrochlorothiazide (HYDRODIURIL) 25 MG tablet Take 1 tablet (25 mg) by mouth daily For high Blood Pressure 90 tablet 3         Review of Systems  CONSTITUTIONAL: NEGATIVE for fever, chills, change in weight  INTEGUMENTARY/SKIN: NEGATIVE for worrisome rashes, moles or lesions  EYES: NEGATIVE for vision changes or irritation  ENT: NEGATIVE for ear, mouth and throat problems  RESP: NEGATIVE for significant cough or  "SOB  CV: NEGATIVE for chest pain, palpitations or peripheral edema  GI: NEGATIVE for nausea, abdominal pain, heartburn, or change in bowel habits   male: negative for dysuria, hematuria, decreased urinary stream, erectile dysfunction, urethral discharge  MUSCULOSKELETAL: NEGATIVE for significant arthralgias or myalgia  NEURO: NEGATIVE for weakness, dizziness or paresthesias  PSYCHIATRIC: NEGATIVE for changes in mood or affect    OBJECTIVE:   /75 (BP Location: Right arm, Patient Position: Chair, Cuff Size: Adult Large)   Pulse 52   Temp 96.9  F (36.1  C) (Temporal)   Ht 1.854 m (6' 1\")   Wt 115.7 kg (255 lb)   SpO2 95%   BMI 33.64 kg/m      Physical Exam  GENERAL APPEARANCE:  alert and no distress  EYES: Wears glasses. Eyes grossly normal to inspection, PERRL and conjunctivae and sclerae normal  HENT: ear canals and TM's normal, nose and mouth without ulcers or lesions, oropharynx clear and oral mucous membranes moist  NECK: no adenopathy, no asymmetry, masses, or scars and thyroid normal to palpation  RESP: lungs clear to auscultation - no rales, rhonchi or wheezes  CV: regular rate and rhythm, normal S1 S2, no S3 or S4, no murmur, click or rub, no peripheral edema and peripheral pulses strong  ABDOMEN: soft, nontender, no hepatosplenomegaly, no masses and bowel sounds normal  Rectal exam: normal prostate without masses. Stool soft and brown.  MS: no musculoskeletal defects are noted and gait is age appropriate without ataxia  SKIN: no suspicious lesions or rashes  NEURO: Normal strength and tone, sensory exam grossly normal, mentation intact and speech normal  PSYCH: mentation appears normal and affect normal/bright        ASSESSMENT/PLAN:   Assessment and Plan:     (Z00.00) Routine general medical examination at a health care facility  (primary encounter diagnosis)  Comment: recd he get his shingrex booster  Plan: CBC with platelets            (F33.1) Major depressive disorder, recurrent episode, " "moderate (H)  Comment: added prozac 10mg every day. F/u one month. Cont current dose of wellbutrin. Start doing some exercise.  Plan: buPROPion (WELLBUTRIN XL) 300 MG 24 hr tablet        refilled    (E78.5) Hyperlipidemia LDL goal <130  Comment:   Plan: Lipid panel reflex to direct LDL Fasting            (I10) Benign hypertension  Comment:   Plan: Comprehensive metabolic panel,         hydrochlorothiazide (HYDRODIURIL) 25 MG tablet            (E78.5) Hyperlipidemia LDL goal <160  Comment:   Plan: atorvastatin (LIPITOR) 40 MG tablet            (Z12.5) Screening for prostate cancer  Comment:   Plan: PSA, screen              Patient has been advised of split billing requirements and indicates understanding: Yes  COUNSELING:   Reviewed preventive health counseling, as reflected in patient instructions    Estimated body mass index is 33.64 kg/m  as calculated from the following:    Height as of this encounter: 1.854 m (6' 1\").    Weight as of this encounter: 115.7 kg (255 lb).         He reports that he has never smoked. He has never used smokeless tobacco.      Counseling Resources:  ATP IV Guidelines  Pooled Cohorts Equation Calculator  FRAX Risk Assessment  ICSI Preventive Guidelines  Dietary Guidelines for Americans, 2010  USDA's MyPlate  ASA Prophylaxis  Lung CA Screening    Juhi Skelton PA-C  [facility]  "

## 2021-05-25 ENCOUNTER — OFFICE VISIT (OUTPATIENT)
Dept: FAMILY MEDICINE | Facility: CLINIC | Age: 56
End: 2021-05-25
Payer: COMMERCIAL

## 2021-05-25 VITALS
DIASTOLIC BLOOD PRESSURE: 75 MMHG | TEMPERATURE: 96.9 F | OXYGEN SATURATION: 95 % | HEART RATE: 52 BPM | SYSTOLIC BLOOD PRESSURE: 116 MMHG | WEIGHT: 255 LBS | BODY MASS INDEX: 33.8 KG/M2 | HEIGHT: 73 IN

## 2021-05-25 DIAGNOSIS — Z00.00 ROUTINE GENERAL MEDICAL EXAMINATION AT A HEALTH CARE FACILITY: Primary | ICD-10-CM

## 2021-05-25 DIAGNOSIS — I10 BENIGN HYPERTENSION: ICD-10-CM

## 2021-05-25 DIAGNOSIS — E78.5 HYPERLIPIDEMIA LDL GOAL <130: ICD-10-CM

## 2021-05-25 DIAGNOSIS — G47.33 OSA ON CPAP: ICD-10-CM

## 2021-05-25 DIAGNOSIS — F33.1 MAJOR DEPRESSIVE DISORDER, RECURRENT EPISODE, MODERATE (H): ICD-10-CM

## 2021-05-25 DIAGNOSIS — Z12.5 SCREENING FOR PROSTATE CANCER: ICD-10-CM

## 2021-05-25 DIAGNOSIS — E78.5 HYPERLIPIDEMIA LDL GOAL <160: ICD-10-CM

## 2021-05-25 LAB
ALBUMIN SERPL-MCNC: 3.9 G/DL (ref 3.4–5)
ALP SERPL-CCNC: 90 U/L (ref 40–150)
ALT SERPL W P-5'-P-CCNC: 66 U/L (ref 0–70)
ANION GAP SERPL CALCULATED.3IONS-SCNC: 3 MMOL/L (ref 3–14)
AST SERPL W P-5'-P-CCNC: 26 U/L (ref 0–45)
BILIRUB SERPL-MCNC: 0.7 MG/DL (ref 0.2–1.3)
BUN SERPL-MCNC: 16 MG/DL (ref 7–30)
CALCIUM SERPL-MCNC: 9.1 MG/DL (ref 8.5–10.1)
CHLORIDE SERPL-SCNC: 107 MMOL/L (ref 94–109)
CHOLEST SERPL-MCNC: 164 MG/DL
CO2 SERPL-SCNC: 31 MMOL/L (ref 20–32)
CREAT SERPL-MCNC: 1.2 MG/DL (ref 0.66–1.25)
ERYTHROCYTE [DISTWIDTH] IN BLOOD BY AUTOMATED COUNT: 12.7 % (ref 10–15)
GFR SERPL CREATININE-BSD FRML MDRD: 67 ML/MIN/{1.73_M2}
GLUCOSE SERPL-MCNC: 103 MG/DL (ref 70–99)
HCT VFR BLD AUTO: 41.4 % (ref 40–53)
HDLC SERPL-MCNC: 33 MG/DL
HGB BLD-MCNC: 15 G/DL (ref 13.3–17.7)
LDLC SERPL CALC-MCNC: 85 MG/DL
MCH RBC QN AUTO: 33.9 PG (ref 26.5–33)
MCHC RBC AUTO-ENTMCNC: 36.2 G/DL (ref 31.5–36.5)
MCV RBC AUTO: 94 FL (ref 78–100)
NONHDLC SERPL-MCNC: 131 MG/DL
PLATELET # BLD AUTO: 225 10E9/L (ref 150–450)
POTASSIUM SERPL-SCNC: 3.8 MMOL/L (ref 3.4–5.3)
PROT SERPL-MCNC: 7.1 G/DL (ref 6.8–8.8)
RBC # BLD AUTO: 4.42 10E12/L (ref 4.4–5.9)
SODIUM SERPL-SCNC: 141 MMOL/L (ref 133–144)
TRIGL SERPL-MCNC: 228 MG/DL
WBC # BLD AUTO: 5.8 10E9/L (ref 4–11)

## 2021-05-25 PROCEDURE — 99396 PREV VISIT EST AGE 40-64: CPT | Performed by: PHYSICIAN ASSISTANT

## 2021-05-25 PROCEDURE — G0103 PSA SCREENING: HCPCS | Performed by: PHYSICIAN ASSISTANT

## 2021-05-25 PROCEDURE — 85027 COMPLETE CBC AUTOMATED: CPT | Performed by: PHYSICIAN ASSISTANT

## 2021-05-25 PROCEDURE — 80053 COMPREHEN METABOLIC PANEL: CPT | Performed by: PHYSICIAN ASSISTANT

## 2021-05-25 PROCEDURE — 80061 LIPID PANEL: CPT | Performed by: PHYSICIAN ASSISTANT

## 2021-05-25 PROCEDURE — 36415 COLL VENOUS BLD VENIPUNCTURE: CPT | Performed by: PHYSICIAN ASSISTANT

## 2021-05-25 RX ORDER — BUPROPION HYDROCHLORIDE 300 MG/1
TABLET ORAL
Qty: 90 TABLET | Refills: 1 | Status: SHIPPED | OUTPATIENT
Start: 2021-05-25 | End: 2022-01-14

## 2021-05-25 RX ORDER — ATORVASTATIN CALCIUM 40 MG/1
40 TABLET, FILM COATED ORAL DAILY
Qty: 90 TABLET | Refills: 3 | Status: SHIPPED | OUTPATIENT
Start: 2021-05-25 | End: 2021-08-09

## 2021-05-25 RX ORDER — FLUOXETINE 10 MG/1
10 TABLET, FILM COATED ORAL DAILY
Qty: 90 TABLET | Refills: 1 | Status: SHIPPED | OUTPATIENT
Start: 2021-05-25 | End: 2021-07-15

## 2021-05-25 RX ORDER — HYDROCHLOROTHIAZIDE 25 MG/1
25 TABLET ORAL DAILY
Qty: 90 TABLET | Refills: 3 | Status: SHIPPED | OUTPATIENT
Start: 2021-05-25 | End: 2022-02-24

## 2021-05-25 ASSESSMENT — PATIENT HEALTH QUESTIONNAIRE - PHQ9: SUM OF ALL RESPONSES TO PHQ QUESTIONS 1-9: 9

## 2021-05-25 ASSESSMENT — MIFFLIN-ST. JEOR: SCORE: 2035.55

## 2021-05-26 LAB — PSA SERPL-ACNC: 0.65 UG/L (ref 0–4)

## 2021-05-26 NOTE — RESULT ENCOUNTER NOTE
It was a pleasure seeing you for your physical examination.  I wanted to get back to you with your test results.     I am happy to report that your CBC or complete blood count is normal with no signs of anemia, leukemia or platelet abnormalities. Your chemistry panel shows no signs of diabetes.  Your blood salts, kidney tests, liver tests, and proteins are all fine.    Your PSA is normal.    Your total cholesterol is 164 with the normal range being below 200.  Your HDL or good cholesterol is 33 with the normal range being above 40. Exercise can help bring this up. Your LDL or bad cholesterol is 85 with the normal range being below 130.  The triglycerides were high and cutting down on sugar and carbs can help with that.  Continue your current dose of lipitor.      Juhi Skelton PA-C

## 2021-06-29 ENCOUNTER — VIRTUAL VISIT (OUTPATIENT)
Dept: FAMILY MEDICINE | Facility: CLINIC | Age: 56
End: 2021-06-29
Payer: COMMERCIAL

## 2021-06-29 DIAGNOSIS — F32.1 MODERATE MAJOR DEPRESSION (H): Primary | ICD-10-CM

## 2021-06-29 PROCEDURE — 99213 OFFICE O/P EST LOW 20 MIN: CPT | Mod: TEL | Performed by: PHYSICIAN ASSISTANT

## 2021-06-29 NOTE — PROGRESS NOTES
"Syed is a 56 year old who is being evaluated via a billable telephone visit.      What phone number would you like to be contacted at? [phone]  How would you like to obtain your AVS? Sudha Ramos   Syed is a 56 year old who presents for the following health issues: f/u depression    HPI     We added prozac 10mg end of May for depression  He thinks this is \"helping\" but is more tired  He is less depression sxs.    Review of Systems         Objective           Vitals:  No vitals were obtained today due to virtual visit.    Physical Exam   healthy, alert and no distress  PSYCH: Alert and oriented times 3; coherent speech, normal   rate and volume, able to articulate logical thoughts, able   to abstract reason, no tangential thoughts, no hallucinations   or delusions  His affect is normal  RESP: No cough, no audible wheezing, able to talk in full sentences  Remainder of exam unable to be completed due to telephone visits      Assessment and Plan:     (F32.1) Moderate major depression (H)  (primary encounter diagnosis)  Comment: he feels tired, but that is not new. Okay to increase prozac to 20mg and switch to bedtime dosing. Cont current dose of wellbutrin. F/u 6 weeks.  Plan: FLUoxetine (PROZAC) 20 MG capsule              Juhi Skelton PA-C              Phone call duration: 4 minutes  "

## 2021-07-13 DIAGNOSIS — F32.1 MODERATE MAJOR DEPRESSION (H): Primary | ICD-10-CM

## 2021-07-13 NOTE — TELEPHONE ENCOUNTER
Reason for Call:  Medication or medication refill:    Do you use a [facility]?  Name of the pharmacy and phone number for the current request:       [facility]    Name of the medication requested: FLUoxetine (PROZAC) 10 MG tablet [07135]    Other request: Please fax to [phone] or call in to [phone]    Can we leave a detailed message on this number? YES    Phone number patient can be reached at: [phone]    Best Time: Any     Call taken on 7/13/2021 at 2:40 PM by Missy Restrepo

## 2021-07-15 RX ORDER — FLUOXETINE 10 MG/1
10 TABLET, FILM COATED ORAL DAILY
Qty: 90 TABLET | Refills: 1 | OUTPATIENT
Start: 2021-07-15 | End: 2022-02-24

## 2021-07-15 NOTE — TELEPHONE ENCOUNTER
Prescription approved per [facility] Refill Protocol.  Eva Luna RN  [facility]

## 2021-08-07 DIAGNOSIS — E78.5 HYPERLIPIDEMIA LDL GOAL <160: ICD-10-CM

## 2021-08-09 RX ORDER — ATORVASTATIN CALCIUM 40 MG/1
TABLET, FILM COATED ORAL
Qty: 90 TABLET | Refills: 3 | Status: SHIPPED | OUTPATIENT
Start: 2021-08-09 | End: 2022-08-18

## 2022-02-21 ASSESSMENT — ENCOUNTER SYMPTOMS
SHORTNESS OF BREATH: 0
MYALGIAS: 0
FREQUENCY: 0
NAUSEA: 0
CONSTIPATION: 0
PARESTHESIAS: 0
COUGH: 0
ABDOMINAL PAIN: 0
HEARTBURN: 0
HEADACHES: 0
DYSURIA: 0
PALPITATIONS: 0
NERVOUS/ANXIOUS: 0
HEMATOCHEZIA: 0
EYE PAIN: 0
DIARRHEA: 0
SORE THROAT: 0
WEAKNESS: 0
FEVER: 0
ARTHRALGIAS: 0
DIZZINESS: 0
CHILLS: 0
HEMATURIA: 0
JOINT SWELLING: 0

## 2022-02-24 ENCOUNTER — OFFICE VISIT (OUTPATIENT)
Dept: FAMILY MEDICINE | Facility: CLINIC | Age: 57
End: 2022-02-24
Payer: COMMERCIAL

## 2022-02-24 VITALS
HEIGHT: 73 IN | BODY MASS INDEX: 34.46 KG/M2 | TEMPERATURE: 97 F | SYSTOLIC BLOOD PRESSURE: 131 MMHG | WEIGHT: 260 LBS | HEART RATE: 55 BPM | DIASTOLIC BLOOD PRESSURE: 86 MMHG | OXYGEN SATURATION: 98 % | RESPIRATION RATE: 16 BRPM

## 2022-02-24 DIAGNOSIS — Z00.00 ROUTINE GENERAL MEDICAL EXAMINATION AT A HEALTH CARE FACILITY: Primary | ICD-10-CM

## 2022-02-24 DIAGNOSIS — E78.5 HYPERLIPIDEMIA LDL GOAL <160: ICD-10-CM

## 2022-02-24 DIAGNOSIS — I10 BENIGN HYPERTENSION: ICD-10-CM

## 2022-02-24 DIAGNOSIS — F33.1 MAJOR DEPRESSIVE DISORDER, RECURRENT EPISODE, MODERATE (H): ICD-10-CM

## 2022-02-24 DIAGNOSIS — G47.33 OSA ON CPAP: ICD-10-CM

## 2022-02-24 DIAGNOSIS — R09.82 POST-NASAL DRIP: ICD-10-CM

## 2022-02-24 DIAGNOSIS — Z12.5 SCREENING FOR PROSTATE CANCER: ICD-10-CM

## 2022-02-24 PROCEDURE — 99396 PREV VISIT EST AGE 40-64: CPT | Performed by: PHYSICIAN ASSISTANT

## 2022-02-24 RX ORDER — FLUTICASONE PROPIONATE 50 MCG
1 SPRAY, SUSPENSION (ML) NASAL DAILY
Qty: 16 G | Refills: 11 | Status: SHIPPED | OUTPATIENT
Start: 2022-02-24 | End: 2022-09-30

## 2022-02-24 ASSESSMENT — PAIN SCALES - GENERAL: PAINLEVEL: NO PAIN (0)

## 2022-02-24 NOTE — PROGRESS NOTES
SUBJECTIVE:   CC: Syed Terrell is an 56 year old male who presents for preventative health visit.     Shingrex #2 due  Last colonoscopy 2020 and incomplete. Recd repeating after double prep.  Skin: has papule on forehead that doesn't go away. Has seen Dr. Pichardo in the past.  Recall on CPAP, it whistles, will f/u with Dr. Kelly  Has a sore throat if doesn't use  He has freq throat clearing with phlegm in throat  Depression is stable for now. Tried prozac and didn't think it helped.  He has gained some weight, but not exercising.  Plans to start biking.      Patient has been advised of split billing requirements and indicates understanding: Yes  Healthy Habits:     Getting at least 3 servings of Calcium per day:  Yes    Bi-annual eye exam:  Yes    Dental care twice a year:  Yes    Sleep apnea or symptoms of sleep apnea:  Sleep apnea    Diet:  Regular (no restrictions)    Frequency of exercise:  1 day/week    Duration of exercise:  30-45 minutes    Taking medications regularly:  Yes    Barriers to taking medications:  None    Medication side effects:  None    PHQ-2 Total Score: 0    Additional concerns today:  No        Today's PHQ-2 Score:   PHQ-2 ( 1999 Pfizer) 2/24/2022   Q1: Little interest or pleasure in doing things 0   Q2: Feeling down, depressed or hopeless 0   PHQ-2 Score 0   PHQ-2 Total Score (12-17 Years)- Positive if 3 or more points; Administer PHQ-A if positive -   Q1: Little interest or pleasure in doing things -   Q2: Feeling down, depressed or hopeless -   PHQ-2 Score -       Abuse: Current or Past(Physical, Sexual or Emotional)- No  Do you feel safe in your environment? Yes        Social History     Tobacco Use     Smoking status: Never Smoker     Smokeless tobacco: Never Used   Substance Use Topics     Alcohol use: Yes     Alcohol/week: 0.0 - 1.0 standard drinks     Comment: 2 drinks per week     If you drink alcohol do you typically have >3 drinks per day or >7 drinks per week? Yes      No  flowsheet data found.    Last PSA:   PSA   Date Value Ref Range Status   05/25/2021 0.65 0 - 4 ug/L Final     Comment:     Assay Method:  Chemiluminescence using Siemens Vista analyzer       Reviewed orders with patient. Reviewed health maintenance and updated orders accordingly -       Reviewed and updated as needed this visit by clinical staff   Tobacco  Allergies  Meds              Reviewed and updated as needed this visit by Provider                 Past Medical History:   Diagnosis Date     Colon polyp 2010    Repeat colonoscopy 2015     Depressive disorder Long Term     FH: colon cancer      Hyperlipidemia LDL goal <160      Hypertension      Moderate major depression (H) 6/12/2013     Needle phobia      VANESSA on CPAP     could not tolerate CPAP     Past Surgical History:   Procedure Laterality Date     COLONOSCOPY  2020     CV HEART CATHETERIZATION WITH POSSIBLE INTERVENTION N/A 1/17/2020    Procedure: Coronary Angiogram;  Surgeon: Patricia Gomez MD;  Location: [facility] CARDIAC CATH LAB     Current Outpatient Medications   Medication Sig Dispense Refill     aspirin (ASA) 81 MG chewable tablet Take 81 mg by mouth daily       atorvastatin (LIPITOR) 40 MG tablet TAKE 1 TABLET BY MOUTH EVERY DAY 90 tablet 3     buPROPion (WELLBUTRIN XL) 300 MG 24 hr tablet TAKE 1 TABLET EVERY MORNING 90 tablet 3     Cholecalciferol (VITAMIN D) 2000 UNITS CAPS Take by mouth five times a week        FLUoxetine (PROZAC) 10 MG tablet Take 1 tablet (10 mg) by mouth daily 90 tablet 1         Review of Systems  CONSTITUTIONAL: NEGATIVE for fever, chills, up 5lbs since May  INTEGUMENTARY/SKIN: NEGATIVE for worrisome rashes, moles or lesions  EYES: NEGATIVE for vision changes or irritation  ENT: NEGATIVE for ear, mouth and throat problems  RESP: NEGATIVE for significant cough or SOB  CV: NEGATIVE for chest pain, palpitations or peripheral edema  GI: NEGATIVE for nausea, abdominal pain, heartburn, or change in bowel habits    "male: negative for dysuria, hematuria, decreased urinary stream, erectile dysfunction, urethral discharge  MUSCULOSKELETAL: NEGATIVE for significant arthralgias or myalgia  NEURO: NEGATIVE for weakness, dizziness or paresthesias  PSYCHIATRIC: NEGATIVE for changes in mood or affect    OBJECTIVE:   /86 (BP Location: Right arm, Patient Position: Chair, Cuff Size: Adult Large)   Pulse 55   Temp 97  F (36.1  C) (Temporal)   Resp 16   Ht 1.854 m (6' 1\")   Wt 117.9 kg (260 lb)   SpO2 98%   BMI 34.30 kg/m      Physical Exam  GENERAL: healthy, alert and no distress  EYES: Eyes grossly normal to inspection, PERRL and conjunctivae and sclerae normal  HENT: ear canals and TM's normal, nose and mouth without ulcers or lesions  NECK: no adenopathy, no asymmetry, masses, or scars and thyroid normal to palpation  RESP: lungs clear to auscultation - no rales, rhonchi or wheezes  CV: regular rate and rhythm, normal S1 S2, no S3 or S4, no murmur, click or rub, no peripheral edema and peripheral pulses strong  ABDOMEN: soft, nontender, no hepatosplenomegaly, no masses and bowel sounds normal  MS: no gross musculoskeletal defects noted, no edema  SKIN: no suspicious lesions or rashes  NEURO: Normal strength and tone, mentation intact and speech normal  PSYCH: mentation appears normal, affect normal/bright        ASSESSMENT/PLAN:   Assessment and Plan:     (Z00.00) Routine general medical examination at a health care facility  (primary encounter diagnosis)  Comment: colonoscopy up to date.   Plan: CBC with platelets            (F33.1) Major depressive disorder, recurrent episode, moderate (H)  Comment:   Plan: cont wellbutrin     (E78.5) Hyperlipidemia LDL goal <160  Comment:   Plan: Lipid panel reflex to direct LDL Fasting        Pt will return fasting for labs.    (G47.33,  Z99.89) VANESSA on CPAP  Comment:   Plan: call [facility] for new CPAP    (I10) Benign hypertension  Comment: well controlled  Plan: Comprehensive metabolic " "panel (BMP + Alb, Alk         Phos, ALT, AST, Total. Bili, TP)            (Z12.5) Screening for prostate cancer  Comment:   Plan: PSA, screen            (R09.82) Post-nasal drip  Comment:   Plan: fluticasone (FLONASE) 50 MCG/ACT nasal spray                Patient has been advised of split billing requirements and indicates understanding: Yes    COUNSELING:   Reviewed preventive health counseling, as reflected in patient instructions    Estimated body mass index is 34.3 kg/m  as calculated from the following:    Height as of this encounter: 1.854 m (6' 1\").    Weight as of this encounter: 117.9 kg (260 lb).         He reports that he has never smoked. He has never used smokeless tobacco.      Counseling Resources:  ATP IV Guidelines  Pooled Cohorts Equation Calculator  FRAX Risk Assessment  ICSI Preventive Guidelines  Dietary Guidelines for Americans, 2010  USDA's MyPlate  ASA Prophylaxis  Lung CA Screening    Juhi Skelton PA-C  [facility]  "

## 2022-02-24 NOTE — PATIENT INSTRUCTIONS
See Dr. Kelly at [facility] [phone] for your sleep apnea    Schedule FASTING labs    Shingrex #2 due    [email or web address]

## 2022-03-08 ENCOUNTER — LAB (OUTPATIENT)
Dept: LAB | Facility: CLINIC | Age: 57
End: 2022-03-08
Payer: COMMERCIAL

## 2022-03-08 DIAGNOSIS — Z12.5 SCREENING FOR PROSTATE CANCER: ICD-10-CM

## 2022-03-08 DIAGNOSIS — Z00.00 ROUTINE GENERAL MEDICAL EXAMINATION AT A HEALTH CARE FACILITY: ICD-10-CM

## 2022-03-08 DIAGNOSIS — E78.5 HYPERLIPIDEMIA LDL GOAL <160: ICD-10-CM

## 2022-03-08 DIAGNOSIS — R74.01 ELEVATED ALT MEASUREMENT: Primary | ICD-10-CM

## 2022-03-08 DIAGNOSIS — I10 BENIGN HYPERTENSION: ICD-10-CM

## 2022-03-08 LAB
ALBUMIN SERPL-MCNC: 4.4 G/DL (ref 3.4–5)
ALP SERPL-CCNC: 108 U/L (ref 40–150)
ALT SERPL W P-5'-P-CCNC: 80 U/L (ref 0–70)
ANION GAP SERPL CALCULATED.3IONS-SCNC: 9 MMOL/L (ref 3–14)
AST SERPL W P-5'-P-CCNC: 26 U/L (ref 0–45)
BILIRUB SERPL-MCNC: 0.9 MG/DL (ref 0.2–1.3)
BUN SERPL-MCNC: 20 MG/DL (ref 7–30)
CALCIUM SERPL-MCNC: 9.4 MG/DL (ref 8.5–10.1)
CHLORIDE BLD-SCNC: 106 MMOL/L (ref 94–109)
CHOLEST SERPL-MCNC: 196 MG/DL
CO2 SERPL-SCNC: 26 MMOL/L (ref 20–32)
CREAT SERPL-MCNC: 1.11 MG/DL (ref 0.66–1.25)
ERYTHROCYTE [DISTWIDTH] IN BLOOD BY AUTOMATED COUNT: 12.8 % (ref 10–15)
FASTING STATUS PATIENT QL REPORTED: YES
GFR SERPL CREATININE-BSD FRML MDRD: 77 ML/MIN/1.73M2
GLUCOSE BLD-MCNC: 98 MG/DL (ref 70–99)
HCT VFR BLD AUTO: 44.9 % (ref 40–53)
HDLC SERPL-MCNC: 33 MG/DL
HGB BLD-MCNC: 15.4 G/DL (ref 13.3–17.7)
LDLC SERPL CALC-MCNC: 102 MG/DL
MCH RBC QN AUTO: 32.2 PG (ref 26.5–33)
MCHC RBC AUTO-ENTMCNC: 34.3 G/DL (ref 31.5–36.5)
MCV RBC AUTO: 94 FL (ref 78–100)
NONHDLC SERPL-MCNC: 163 MG/DL
PLATELET # BLD AUTO: 227 10E3/UL (ref 150–450)
POTASSIUM BLD-SCNC: 3.8 MMOL/L (ref 3.4–5.3)
PROT SERPL-MCNC: 8 G/DL (ref 6.8–8.8)
PSA SERPL-MCNC: 0.88 UG/L (ref 0–4)
RBC # BLD AUTO: 4.79 10E6/UL (ref 4.4–5.9)
SODIUM SERPL-SCNC: 141 MMOL/L (ref 133–144)
TRIGL SERPL-MCNC: 307 MG/DL
WBC # BLD AUTO: 5.8 10E3/UL (ref 4–11)

## 2022-03-08 PROCEDURE — G0103 PSA SCREENING: HCPCS

## 2022-03-08 PROCEDURE — 80061 LIPID PANEL: CPT

## 2022-03-08 PROCEDURE — 85027 COMPLETE CBC AUTOMATED: CPT

## 2022-03-08 PROCEDURE — 36415 COLL VENOUS BLD VENIPUNCTURE: CPT

## 2022-03-08 PROCEDURE — 80053 COMPREHEN METABOLIC PANEL: CPT

## 2022-03-09 NOTE — RESULT ENCOUNTER NOTE
It was a pleasure seeing you for your physical examination.  I wanted to get back to you with your test results.     I am happy to report that your CBC or complete blood count is normal with no signs of anemia, leukemia or platelet abnormalities. Your chemistry panel shows no signs of diabetes.  Your blood salts, kidney tests and proteins are all fine.    Your PSA is normal.      One of your liver tests was elevated.  Avoid drinking alcohol, watch the fat in your diet and let's recheck that level in one month. Schedule a lab only. You don't need to fast.    Your total cholesterol is 196 with the normal range being below 200.  Your HDL or good cholesterol is 33 with the normal range being above 40.  Your LDL or bad cholesterol is 102 with the normal range being below 130.  Exercise can help bring up that HDL.  Your triglycerides were high.  Decreasing carbs, sugar and alcohol can help bring these down.    Juhi Skelton PA-C

## 2022-03-10 ENCOUNTER — TRANSFERRED RECORDS (OUTPATIENT)
Dept: HEALTH INFORMATION MANAGEMENT | Facility: CLINIC | Age: 57
End: 2022-03-10
Payer: COMMERCIAL

## 2022-06-02 DIAGNOSIS — F33.40 RECURRENT MAJOR DEPRESSIVE DISORDER, IN REMISSION (H): Primary | ICD-10-CM

## 2022-06-03 NOTE — TELEPHONE ENCOUNTER
Routing refill request to provider for review/approval because:  Drug not active on patient's medication list since 2/24/2022     Khurram Shay RN  [facility]

## 2022-06-04 RX ORDER — FLUOXETINE 10 MG/1
TABLET, FILM COATED ORAL
Qty: 90 TABLET | Refills: 3 | Status: SHIPPED | OUTPATIENT
Start: 2022-06-04 | End: 2022-09-30

## 2022-06-20 DIAGNOSIS — I10 BENIGN HYPERTENSION: ICD-10-CM

## 2022-06-21 RX ORDER — HYDROCHLOROTHIAZIDE 25 MG/1
TABLET ORAL
Qty: 90 TABLET | Refills: 1 | Status: SHIPPED | OUTPATIENT
Start: 2022-06-21 | End: 2022-12-20

## 2022-06-21 NOTE — TELEPHONE ENCOUNTER
Prescription approved per [facility] Refill Protocol.  Soila Proctor RN  [facility]

## 2022-08-17 DIAGNOSIS — E78.5 HYPERLIPIDEMIA LDL GOAL <160: ICD-10-CM

## 2022-08-18 RX ORDER — ATORVASTATIN CALCIUM 40 MG/1
TABLET, FILM COATED ORAL
Qty: 90 TABLET | Refills: 1 | Status: SHIPPED | OUTPATIENT
Start: 2022-08-18 | End: 2023-02-14

## 2022-08-18 NOTE — TELEPHONE ENCOUNTER
Prescription approved per [facility] Refill Protocol.    Sharon Morales, RN BSN MSN  [facility]

## 2022-09-30 ENCOUNTER — OFFICE VISIT (OUTPATIENT)
Dept: FAMILY MEDICINE | Facility: CLINIC | Age: 57
End: 2022-09-30
Payer: COMMERCIAL

## 2022-09-30 VITALS
OXYGEN SATURATION: 97 % | SYSTOLIC BLOOD PRESSURE: 134 MMHG | HEART RATE: 65 BPM | HEIGHT: 74 IN | WEIGHT: 260.2 LBS | TEMPERATURE: 97.7 F | RESPIRATION RATE: 18 BRPM | BODY MASS INDEX: 33.39 KG/M2 | DIASTOLIC BLOOD PRESSURE: 86 MMHG

## 2022-09-30 DIAGNOSIS — F33.1 MODERATE EPISODE OF RECURRENT MAJOR DEPRESSIVE DISORDER (H): ICD-10-CM

## 2022-09-30 DIAGNOSIS — E66.811 CLASS 1 OBESITY WITH SERIOUS COMORBIDITY AND BODY MASS INDEX (BMI) OF 33.0 TO 33.9 IN ADULT, UNSPECIFIED OBESITY TYPE: ICD-10-CM

## 2022-09-30 DIAGNOSIS — R53.83 FATIGUE, UNSPECIFIED TYPE: ICD-10-CM

## 2022-09-30 DIAGNOSIS — I10 HYPERTENSION GOAL BP (BLOOD PRESSURE) < 140/90: Primary | ICD-10-CM

## 2022-09-30 DIAGNOSIS — E78.5 HYPERLIPIDEMIA LDL GOAL <130: ICD-10-CM

## 2022-09-30 PROCEDURE — 99214 OFFICE O/P EST MOD 30 MIN: CPT | Performed by: FAMILY MEDICINE

## 2022-09-30 PROCEDURE — 96127 BRIEF EMOTIONAL/BEHAV ASSMT: CPT | Performed by: FAMILY MEDICINE

## 2022-09-30 ASSESSMENT — PATIENT HEALTH QUESTIONNAIRE - PHQ9
SUM OF ALL RESPONSES TO PHQ QUESTIONS 1-9: 9
10. IF YOU CHECKED OFF ANY PROBLEMS, HOW DIFFICULT HAVE THESE PROBLEMS MADE IT FOR YOU TO DO YOUR WORK, TAKE CARE OF THINGS AT HOME, OR GET ALONG WITH OTHER PEOPLE: NOT DIFFICULT AT ALL
SUM OF ALL RESPONSES TO PHQ QUESTIONS 1-9: 9

## 2022-09-30 ASSESSMENT — PAIN SCALES - GENERAL: PAINLEVEL: NO PAIN (0)

## 2022-09-30 NOTE — PROGRESS NOTES
"  Assessment & Plan     Hypertension goal BP (blood pressure) < 140/90  My first visit with patient.  Previous history reviewed with him and in his chart.  Stable on current regimen.  Continue same plan and routine follow-up.     Hyperlipidemia LDL goal <130  Stable on current regimen.  Continue same plan and routine follow-up.     Moderate episode of recurrent major depressive disorder (H)  Stable on current regimen.  Continue same plan and routine follow-up.     Fatigue, unspecified type  Previous provider started a trial of Provigil.  Patient not really seeing much benefit.  Discussed increasing this to 400 mg daily for a week and if that is not helping we can stop.    Class 1 obesity with serious comorbidity and body mass index (BMI) of 33.0 to 33.9 in adult, unspecified obesity type  Interested in an overall program to talk about weight loss.  I do not know what insurance covers versus not but we can at least plug him in with our comprehensive program options.  - Comprehensive Weight Management; Future         BMI:   Estimated body mass index is 33.86 kg/m  as calculated from the following:    Height as of this encounter: 1.867 m (6' 1.5\").    Weight as of this encounter: 118 kg (260 lb 3.2 oz).   Weight management plan: Discussed healthy diet and exercise guidelines    See Patient Instructions    Return in about 6 months (around 3/30/2023) for Routine preventive, Follow up of Chronic Issues, in office, Previsit labwork.    Elle Daily MD  [facility] JAY Lynch is a 57 year old, presenting for the following health issues:  Hypertension, Establish Care, and Lipids      History of Present Illness       Reason for visit:  Annual checkupHe consumes 0 sweetened beverage(s) daily.He exercises with enough effort to increase his heart rate 9 or less minutes per day.  He exercises with enough effort to increase his heart rate 3 or less days per week.   He is taking " "medications regularly.    Today's PHQ-9         PHQ-9 Total Score: 9    PHQ-9 Q9 Thoughts of better off dead/self-harm past 2 weeks :   Not at all    How difficult have these problems made it for you to do your work, take care of things at home, or get along with other people: Not difficult at all       Hyperlipidemia Follow-Up      Are you regularly taking any medication or supplement to lower your cholesterol?   Yes- atorvastatin     Are you having muscle aches or other side effects that you think could be caused by your cholesterol lowering medication?  No    Hypertension Follow-up      Do you check your blood pressure regularly outside of the clinic? No     Are you following a low salt diet? No    Are your blood pressures ever more than 140 on the top number (systolic) OR more   than 90 on the bottom number (diastolic), for example 140/90? No      How many servings of fruits and vegetables do you eat daily?      On average, how many sweetened beverages do you drink each day (Examples: soda, juice, sweet tea, etc.  Do NOT count diet or artificially sweetened beverages)?       How many days per week do you exercise enough to make your heart beat faster?     How many minutes a day do you exercise enough to make your heart beat faster?     How many days per week do you miss taking your medication?     Here today to establish care and follow-up for stable chronic issues.  Also on to talk about weight loss.    Review of Systems   Constitutional, HEENT, cardiovascular, pulmonary, gi and gu systems are negative, except as otherwise noted.      Objective    /86   Pulse 65   Temp 97.7  F (36.5  C) (Tympanic)   Resp 18   Ht 1.867 m (6' 1.5\")   Wt 118 kg (260 lb 3.2 oz)   SpO2 97%   BMI 33.86 kg/m    Body mass index is 33.86 kg/m .  Physical Exam   Alert, pleasant, upbeat, and in no apparent discomfort.  S1 and S2 normal, no murmurs, clicks, gallops or rubs. Regular rate and rhythm. Chest is clear; no wheezes " or rales. No edema or JVD.  Past labs reviewed with the patient.

## 2023-01-09 DIAGNOSIS — F33.1 MAJOR DEPRESSIVE DISORDER, RECURRENT EPISODE, MODERATE (H): ICD-10-CM

## 2023-01-09 RX ORDER — BUPROPION HYDROCHLORIDE 300 MG/1
TABLET ORAL
Qty: 90 TABLET | Refills: 1 | Status: SHIPPED | OUTPATIENT
Start: 2023-01-09 | End: 2023-07-13

## 2023-03-16 DIAGNOSIS — I10 BENIGN HYPERTENSION: ICD-10-CM

## 2023-03-20 RX ORDER — HYDROCHLOROTHIAZIDE 25 MG/1
TABLET ORAL
Qty: 90 TABLET | Refills: 0 | Status: SHIPPED | OUTPATIENT
Start: 2023-03-20 | End: 2023-06-21

## 2023-04-02 ENCOUNTER — HEALTH MAINTENANCE LETTER (OUTPATIENT)
Age: 58
End: 2023-04-02

## 2023-04-15 ASSESSMENT — ENCOUNTER SYMPTOMS
SHORTNESS OF BREATH: 1
FREQUENCY: 1

## 2023-04-17 ENCOUNTER — OFFICE VISIT (OUTPATIENT)
Dept: FAMILY MEDICINE | Facility: CLINIC | Age: 58
End: 2023-04-17
Payer: COMMERCIAL

## 2023-04-17 VITALS
SYSTOLIC BLOOD PRESSURE: 160 MMHG | HEIGHT: 74 IN | DIASTOLIC BLOOD PRESSURE: 93 MMHG | TEMPERATURE: 98.9 F | BODY MASS INDEX: 34.53 KG/M2 | RESPIRATION RATE: 14 BRPM | OXYGEN SATURATION: 97 % | HEART RATE: 60 BPM | WEIGHT: 269.1 LBS

## 2023-04-17 DIAGNOSIS — R39.9 LOWER URINARY TRACT SYMPTOMS (LUTS): ICD-10-CM

## 2023-04-17 DIAGNOSIS — L81.9 PIGMENTED SKIN LESION OF UNCERTAIN BEHAVIOR OF UPPER EXTREMITY: ICD-10-CM

## 2023-04-17 DIAGNOSIS — Z00.00 ENCOUNTER FOR MEDICARE ANNUAL WELLNESS EXAM: Primary | ICD-10-CM

## 2023-04-17 DIAGNOSIS — Z12.11 SCREEN FOR COLON CANCER: ICD-10-CM

## 2023-04-17 DIAGNOSIS — Z12.83 SKIN CANCER SCREENING: ICD-10-CM

## 2023-04-17 DIAGNOSIS — L30.9 DERMATITIS: ICD-10-CM

## 2023-04-17 DIAGNOSIS — N52.9 ERECTILE DYSFUNCTION, UNSPECIFIED ERECTILE DYSFUNCTION TYPE: ICD-10-CM

## 2023-04-17 DIAGNOSIS — I77.810 AORTIC ROOT DILATION (H): ICD-10-CM

## 2023-04-17 PROCEDURE — 99396 PREV VISIT EST AGE 40-64: CPT | Mod: 25 | Performed by: FAMILY MEDICINE

## 2023-04-17 PROCEDURE — 99214 OFFICE O/P EST MOD 30 MIN: CPT | Mod: 25 | Performed by: FAMILY MEDICINE

## 2023-04-17 PROCEDURE — 17110 DESTRUCTION B9 LES UP TO 14: CPT | Performed by: FAMILY MEDICINE

## 2023-04-17 RX ORDER — FLUOCINONIDE TOPICAL SOLUTION USP, 0.05% 0.5 MG/ML
SOLUTION TOPICAL 2 TIMES DAILY
Qty: 60 ML | Refills: 1 | Status: SHIPPED | OUTPATIENT
Start: 2023-04-17 | End: 2024-01-26

## 2023-04-17 RX ORDER — TAMSULOSIN HYDROCHLORIDE 0.4 MG/1
0.4 CAPSULE ORAL DAILY
Qty: 30 CAPSULE | Refills: 3 | Status: SHIPPED | OUTPATIENT
Start: 2023-04-17 | End: 2023-05-15

## 2023-04-17 RX ORDER — SILDENAFIL 50 MG/1
50 TABLET, FILM COATED ORAL DAILY PRN
Qty: 6 TABLET | Refills: 11 | Status: SHIPPED | OUTPATIENT
Start: 2023-04-17 | End: 2024-01-23

## 2023-04-17 ASSESSMENT — PATIENT HEALTH QUESTIONNAIRE - PHQ9
SUM OF ALL RESPONSES TO PHQ QUESTIONS 1-9: 3
10. IF YOU CHECKED OFF ANY PROBLEMS, HOW DIFFICULT HAVE THESE PROBLEMS MADE IT FOR YOU TO DO YOUR WORK, TAKE CARE OF THINGS AT HOME, OR GET ALONG WITH OTHER PEOPLE: NOT DIFFICULT AT ALL
SUM OF ALL RESPONSES TO PHQ QUESTIONS 1-9: 3

## 2023-04-17 ASSESSMENT — ENCOUNTER SYMPTOMS
FREQUENCY: 1
SHORTNESS OF BREATH: 1

## 2023-04-17 ASSESSMENT — PAIN SCALES - GENERAL: PAINLEVEL: NO PAIN (0)

## 2023-04-17 NOTE — PATIENT INSTRUCTIONS
Colonoscopy due at the end of July  Once a week of twice daily miralax prior to colonoscopy and then double prep.     Monitor blood pressure (goal is < 140/90)  - check blood pressure at rest for 5-10 minute  - avoid checking blood pressure within 30 minutes of caffeine or exercise.     Dont take the viagra close to the flomax.       Start powdered pyllium fiber such as Metamucil or Citrucel: start 1 tsp per day (mixed with fluid), and increase by 1 tsp per week to goal total dosing of 1-2 tablespoons per day. Drink plenty of water daily (at least 40-60 oz) for fiber to be effective.       Patient Education   Personalized Prevention Plan  You are due for the preventive services outlined below.  Your care team is available to assist you in scheduling these services.  If you have already completed any of these items, please share that information with your care team to update in your medical record.  Health Maintenance Due   Topic Date Due     Hepatitis B Vaccine (1 of 3 - 3-dose series) Never done     Thyroid Function Lab  02/05/2020     COVID-19 Vaccine (4 - Booster for Pfizer series) 01/28/2022     Yearly Preventive Visit  02/24/2023     Comprehensive Metabolic Panel  03/08/2023     Cholesterol Lab  03/08/2023     Prostate Test  03/08/2023     Complete Blood Count  03/08/2023       Exercise for a Healthier Heart  You may wonder how you can improve the health of your heart. If you re thinking about exercise, you re on the right track. You don t need to become an athlete. But you do need a certain amount of brisk exercise to help strengthen your heart. If you have been diagnosed with a heart condition, your healthcare provider may advise exercise to help your condition. To help make exercise a habit, choose safe, fun activities.      Exercise with a friend. When activity is fun, you're more likely to stick with it.     Before you start  Check with your healthcare provider before starting an exercise program. This is  especially important if you haven't been active for a while. It's also important if you have a long-term (chronic) health problem such as heart disease, diabetes, or obesity. Also check with your provider if you're at high risk for having these problems.   Why exercise?  Exercising regularly offers many healthy rewards. It can help you do all of these:     Improve your blood cholesterol level to help prevent further heart trouble.    Lower your blood pressure to help prevent a stroke or heart attack.    Control diabetes or reduce your risk of getting this disease.    Improve your heart and lung function.    Reach and stay at a healthy weight.    Make your muscles stronger so you can stay active.    Prevent falls and fractures by slowing the loss of bone mass (osteoporosis).    Manage stress better.    Improve your sense of self and your body image.  Exercise tips      Ease into your routine. Set small goals. Then build on them. Talk with your healthcare provider first before starting an exercise routine if you're not sure what your activity level should be.    Exercise on most days. Aim for a total of at least 150 minutes (2 hours and 30 minutes) or more of moderate-intensity aerobic activity each week. You could also do 75 minutes (1 hour and 15 minutes) or more of vigorous-intensity aerobic activity each week. Or try for a combination of both. Moderate activity means that you breathe heavier and your heart rate increases, but you can still talk. Think about doing at least 30 minutes of moderate exercise, 5 times a week. It's OK to work up to the 30-minute period over time. Examples of moderate-intensity activity are brisk walking, gardening, and water aerobics.    Step up your daily activity level.  Along with your exercise program, try being more active the whole day. Walk instead of drive. Or park further away so that you take more steps each day. Do more household tasks or yard work. You may not be able to meet  the advised amount of physical activity. But doing some moderate- or vigorous-intensity aerobic activity can help reduce your risk for heart disease. Your healthcare provider can help you figure out what is best for you.    Choose 1 or more activities you enjoy.  Walking is one of the easiest things you can do. You can also try swimming, riding a bike, dancing, or taking an exercise class.    Call 911  Call 911 right away if any of these occur:     Chest pain that doesn't go away quickly with rest    New burning, tightness, pressure, or heaviness in your chest, neck, shoulders, back, or arms    Abnormal or severe shortness of breath    A very fast or irregular heartbeat (palpitations)    Fainting  When to call your healthcare provider  Call your healthcare provider if you have any of these:     Dizziness or lightheadedness    Mild shortness of breath or chest pain    Increased or new joint or muscle pain    Sukhjinder last reviewed this educational content on 7/1/2022 2000-2022 [employer]. All rights reserved. This information is not intended as a substitute for professional medical care. Always follow your healthcare professional's instructions.

## 2023-04-17 NOTE — PROGRESS NOTES
SUBJECTIVE:   CC: Syed is an 57 year old who presents for preventative health visit.        View : No data to display.            Patient has been advised of split billing requirements and indicates understanding: Yes  Healthy Habits:     Getting at least 3 servings of Calcium per day:  Yes    Bi-annual eye exam:  Yes    Dental care twice a year:  Yes    Sleep apnea or symptoms of sleep apnea:  Sleep apnea    Diet:  Regular (no restrictions)    Frequency of exercise:  1 day/week    Duration of exercise:  15-30 minutes    Taking medications regularly:  Yes    Medication side effects:  Not applicable    PHQ-2 Total Score: 0    Additional concerns today:  Yes    lars- using cpap and just got new machine. Continues to feel not fully rested but notes he has had this complaint most of his life. Is on modanafil for this, feels somewhat helpful  Sleep about 8 hours per night.   Mornings are best.     Left elbow dry crusty lesion present. Keeps picking at it but it persists.  Interested in having this treated today.    Head is always itchy. Gets little scabs that tend to flake off.    gerd- occ, minor. Usually dietary triggered.     Deppression- feels wellbutrin works well.     HTN- not monitoring home bp but does have home cuff.   Exercise more in the summer and motivated to get numbers down    Wt loss clinic was not covered when referred     Not interested in vaccines today.     ALT was elevated last year.  2 + drinks per week. Little more lately. [place]entry.         Today's PHQ-2 Score:       4/15/2023     9:23 AM   PHQ-2 ( 1999 Pfizer)   Q1: Little interest or pleasure in doing things 0   Q2: Feeling down, depressed or hopeless 0   PHQ-2 Score 0   Q1: Little interest or pleasure in doing things Not at all   Q2: Feeling down, depressed or hopeless Not at all   PHQ-2 Score 0           Social History     Tobacco Use     Smoking status: Never     Smokeless tobacco: Never   Vaping Use     Vaping status: Never Used   Substance  "Use Topics     Alcohol use: Yes     Alcohol/week: 0.0 - 1.0 standard drinks of alcohol     Comment: 2 drinks per week             4/15/2023     9:23 AM   Alcohol Use   Prescreen: >3 drinks/day or >7 drinks/week? No     Last PSA:   PSA   Date Value Ref Range Status   05/25/2021 0.65 0 - 4 ug/L Final     Comment:     Assay Method:  Chemiluminescence using Siemens Vista analyzer     Prostate Specific Antigen Screen   Date Value Ref Range Status   03/08/2022 0.88 0.00 - 4.00 ug/L Final       Reviewed orders with patient. Reviewed health maintenance and updated orders accordingly - Yes        Reviewed and updated as needed this visit by clinical staff                  Reviewed and updated as needed this visit by Provider                     Review of Systems   Eyes: Positive for visual disturbance.   Respiratory: Positive for shortness of breath.    Genitourinary: Positive for frequency, impotence and urgency.     - new Rx for vision on week ago. Got prism and having a hard time adjusting.   - shortness of breath due to weight. Present for several years. Feels with big belly hard to bend over to tie his shoe. Feels breathing improves after he starts more regularl  exercise. Echo, angio neg for CAD.   No progression of dyspnea since that was done.     - allergies, rhinorrhea, sneezing. No cough, wheeze    - : urinary urgency has been worsening. Nocturia x 1. Stream is slow.  Can fully empty but has to twice void.           OBJECTIVE:   BP (!) 147/86 (BP Location: Right arm, Patient Position: Sitting, Cuff Size: Adult Large)   Pulse 60   Temp 98.9  F (37.2  C) (Oral)   Resp 14   Ht 1.89 m (6' 2.41\")   Wt 122.1 kg (269 lb 1.6 oz)   SpO2 97%   BMI 34.17 kg/m      Physical Exam  GENERAL: healthy, alert and no distress  EYES: Eyes grossly normal to inspection, PERRL and conjunctivae and sclerae normal  HENT: ear canals and TM's normal, nose and mouth without ulcers or lesions  NECK: no adenopathy, no asymmetry, " masses, or scars and thyroid normal to palpation  RESP: lungs clear to auscultation - no rales, rhonchi or wheezes  CV: regular rate and rhythm, normal S1 S2, no S3 or S4, no murmur, click or rub, no peripheral edema and peripheral pulses strong  ABDOMEN: soft, nontender, no hepatosplenomegaly, no masses and bowel sounds normal   (male): normal male genitalia without lesions or urethral discharge, no hernia  MS: no gross musculoskeletal defects noted, no edema  SKIN: no. Rashes.  Left elbow verrucous, dry slightly scaling appearing papule that is less than 1 cm present.  Scalp with several excoriated areas but no overt rash.  No psoriatic skin changes present  This is treated with liquid nitrogen in a freeze thaw pattern x3.  Wound care was reviewed  NEURO: Normal strength and tone, mentation intact and speech normal  PSYCH: mentation appears normal, affect normal/bright    Diagnostic Test Results:  Labs reviewed in Epic    ASSESSMENT/PLAN:   (Z00.00) Encounter for Medicare annual wellness exam  (primary encounter diagnosis)  Comment:   Plan: PRIMARY CARE FOLLOW-UP SCHEDULING            (I18.820) Aortic root dilation (H)  Comment: Noted on his last echo  Plan: Echocardiogram Complete        Recommended follow-up with repeat echo at this year since it is 3 years out from his last.    (N52.9) Erectile dysfunction, unspecified erectile dysfunction type  Comment: New diagnosis for him.  He is interested in trying Viagra  Plan: sildenafil (VIAGRA) 50 MG tablet        Discussed the importance of not taking this medication close to the Flomax.     (Z12.11) Screen for colon cancer  Comment:   Plan: Colonoscopy Screening  Referral            (R39.9) Lower urinary tract symptoms (LUTS)  Comment: Symptomatic  Plan: tamsulosin (FLOMAX) 0.4 MG capsule, UA Macro         with Reflex to Micro and Culture - lab collect        We will trial Flomax.  Sitter also adding on finasteride    (Z12.83) Skin cancer  "screening  Comment:   Plan: Adult Dermatology Referral            (L30.9) Dermatitis  Comment: Scalp  Plan: fluocinonide (LIDEX) 0.05 % external solution        We will trial topical steroid and follow-up with dermatology as needed.  Reviewed frequent use at first of the steroid and then minimizing use to prevent side effects.  Avoid face.    (L81.9) Pigmented skin lesion of uncertain behavior of upper extremity  Comment: This could be a wart that has been picked several times but other skin lesions are not ruled out.  We will try freezing x1 and he will follow-up with dermatology if persists.  Plan: DESTRUCT BENIGN LESION, UP TO 14                COUNSELING:   Reviewed preventive health counseling, as reflected in patient instructions       Regular exercise       Healthy diet/nutrition       Vision screening       Alcohol Use        Colorectal cancer screening       Prostate cancer screening      BMI:   Estimated body mass index is 33.86 kg/m  as calculated from the following:    Height as of 9/30/22: 1.867 m (6' 1.5\").    Weight as of 9/30/22: 118 kg (260 lb 3.2 oz).   Weight management plan: Discussed options for formal program such as weight watchers.  Also discussed medications that could be trialed.  He declines meds today and will consider a formal program      He reports that he has never smoked. He has never used smokeless tobacco.        Jaqui Koch MD  [facility]  Answers for HPI/ROS submitted by the patient on 4/17/2023  If you checked off any problems, how difficult have these problems made it for you to do your work, take care of things at home, or get along with other people?: Not difficult at all  PHQ9 TOTAL SCORE: 3        He is at risk for lack of exercise and has been provided with information to increase physical activity for the benefit of his well-being.  "

## 2023-05-03 ENCOUNTER — LAB (OUTPATIENT)
Dept: LAB | Facility: CLINIC | Age: 58
End: 2023-05-03
Payer: COMMERCIAL

## 2023-05-03 DIAGNOSIS — I10 HYPERTENSION GOAL BP (BLOOD PRESSURE) < 140/90: ICD-10-CM

## 2023-05-03 DIAGNOSIS — E78.5 HYPERLIPIDEMIA LDL GOAL <130: ICD-10-CM

## 2023-05-03 DIAGNOSIS — Z12.5 SCREENING FOR PROSTATE CANCER: ICD-10-CM

## 2023-05-03 DIAGNOSIS — R39.9 LOWER URINARY TRACT SYMPTOMS (LUTS): ICD-10-CM

## 2023-05-03 LAB
ALBUMIN SERPL BCG-MCNC: 4.6 G/DL (ref 3.5–5.2)
ALBUMIN UR-MCNC: NEGATIVE MG/DL
ALP SERPL-CCNC: 86 U/L (ref 40–129)
ALT SERPL W P-5'-P-CCNC: 107 U/L (ref 10–50)
ANION GAP SERPL CALCULATED.3IONS-SCNC: 14 MMOL/L (ref 7–15)
APPEARANCE UR: CLEAR
AST SERPL W P-5'-P-CCNC: 51 U/L (ref 10–50)
BASOPHILS # BLD AUTO: 0 10E3/UL (ref 0–0.2)
BASOPHILS NFR BLD AUTO: 1 %
BILIRUB SERPL-MCNC: 0.6 MG/DL
BILIRUB UR QL STRIP: NEGATIVE
BUN SERPL-MCNC: 20.3 MG/DL (ref 6–20)
CALCIUM SERPL-MCNC: 9.9 MG/DL (ref 8.6–10)
CHLORIDE SERPL-SCNC: 103 MMOL/L (ref 98–107)
CHOLEST SERPL-MCNC: 163 MG/DL
COLOR UR AUTO: YELLOW
CREAT SERPL-MCNC: 1.21 MG/DL (ref 0.67–1.17)
DEPRECATED HCO3 PLAS-SCNC: 24 MMOL/L (ref 22–29)
EOSINOPHIL # BLD AUTO: 0.2 10E3/UL (ref 0–0.7)
EOSINOPHIL NFR BLD AUTO: 3 %
ERYTHROCYTE [DISTWIDTH] IN BLOOD BY AUTOMATED COUNT: 12.2 % (ref 10–15)
GFR SERPL CREATININE-BSD FRML MDRD: 69 ML/MIN/1.73M2
GLUCOSE SERPL-MCNC: 107 MG/DL (ref 70–99)
GLUCOSE UR STRIP-MCNC: NEGATIVE MG/DL
HCT VFR BLD AUTO: 40.8 % (ref 40–53)
HDLC SERPL-MCNC: 30 MG/DL
HGB BLD-MCNC: 14.7 G/DL (ref 13.3–17.7)
HGB UR QL STRIP: NEGATIVE
HOLD SPECIMEN: NORMAL
IMM GRANULOCYTES # BLD: 0 10E3/UL
IMM GRANULOCYTES NFR BLD: 0 %
KETONES UR STRIP-MCNC: NEGATIVE MG/DL
LDLC SERPL CALC-MCNC: 93 MG/DL
LEUKOCYTE ESTERASE UR QL STRIP: NEGATIVE
LYMPHOCYTES # BLD AUTO: 1.7 10E3/UL (ref 0.8–5.3)
LYMPHOCYTES NFR BLD AUTO: 28 %
MCH RBC QN AUTO: 33 PG (ref 26.5–33)
MCHC RBC AUTO-ENTMCNC: 36 G/DL (ref 31.5–36.5)
MCV RBC AUTO: 92 FL (ref 78–100)
MONOCYTES # BLD AUTO: 0.5 10E3/UL (ref 0–1.3)
MONOCYTES NFR BLD AUTO: 8 %
NEUTROPHILS # BLD AUTO: 3.6 10E3/UL (ref 1.6–8.3)
NEUTROPHILS NFR BLD AUTO: 60 %
NITRATE UR QL: NEGATIVE
NONHDLC SERPL-MCNC: 133 MG/DL
PH UR STRIP: 5.5 [PH] (ref 5–7)
PLATELET # BLD AUTO: 227 10E3/UL (ref 150–450)
POTASSIUM SERPL-SCNC: 3.6 MMOL/L (ref 3.4–5.3)
PROT SERPL-MCNC: 7.1 G/DL (ref 6.4–8.3)
PSA SERPL DL<=0.01 NG/ML-MCNC: 0.6 NG/ML (ref 0–3.5)
RBC # BLD AUTO: 4.45 10E6/UL (ref 4.4–5.9)
SODIUM SERPL-SCNC: 141 MMOL/L (ref 136–145)
SP GR UR STRIP: 1.02 (ref 1–1.03)
T4 FREE SERPL-MCNC: 1.36 NG/DL (ref 0.9–1.7)
TRIGL SERPL-MCNC: 202 MG/DL
TSH SERPL DL<=0.005 MIU/L-ACNC: 5.14 UIU/ML (ref 0.3–4.2)
UROBILINOGEN UR STRIP-ACNC: 0.2 E.U./DL
WBC # BLD AUTO: 6 10E3/UL (ref 4–11)

## 2023-05-03 PROCEDURE — 36415 COLL VENOUS BLD VENIPUNCTURE: CPT

## 2023-05-03 PROCEDURE — 80050 GENERAL HEALTH PANEL: CPT

## 2023-05-03 PROCEDURE — G0103 PSA SCREENING: HCPCS

## 2023-05-03 PROCEDURE — 81003 URINALYSIS AUTO W/O SCOPE: CPT

## 2023-05-03 PROCEDURE — 80061 LIPID PANEL: CPT

## 2023-05-03 PROCEDURE — 84439 ASSAY OF FREE THYROXINE: CPT

## 2023-05-05 DIAGNOSIS — R89.9 ABNORMAL LABORATORY TEST RESULT: Primary | ICD-10-CM

## 2023-05-05 NOTE — RESULT ENCOUNTER NOTE
"Syed,  It was a pleasure to see you in the office recently.   -The urinalysis was normal.  -Unfortunately, your liver tests are climbing.  It is unclear to me what is causing this.  At this time, I would suggest stopping all alcohol.  I would also hold your atorvastatin cholesterol medication for a period.  Lets have you return for labs again in 3 to 4 weeks to recheck the liver panel.  If liver tests are remaining up despite these interventions further blood work-up and imaging will be needed of the liver.  Please make a lab only appointment in 3 to 4 weeks.  -Your blood count panel was normal.  - The PSA level (prostate cancer screening test) was negative/normal.   - The fasting blood glucose is elevated in the pre-diabetic range (100-125). This puts you at risk for developing diabetes in the future.  Lifestyle measures will help to lower your blood glucose levels and lower the risks of diabetes. These measures include regular exercise, weight loss/maintaining a healthy body weight, and moderating/decreasing carbohydrates (sugar, bread, pasta, rice, baked goods, potatoes, etc) in your diet. We will continue to monitor your blood glucose annually, but please be seen sooner  if you develop any new signs or symptoms of diabetes (increase thirst or urination, fatigue, blurry vision, unexplained weight loss).   - Your cholesterol panel is normal except for a slightly elevated triglyceride level and low HDL (good cholesterol). Aerobic exercise, weight loss and moderating your carbohydrate intake (especially simple sugars) will all help to lower the triglyceride level and improve the HDL (good cholesterol).   - Your thyroid test was borderline abnormal. This could indicate you are developing hypothyroidism or an \"underactive thyroid gland\". Symptoms of hypothyroidism could include fatigue, weight gain, depression, constipation, muscle and joint pain and elevated liver tests. Given the labs is only borderline abnormal, I " would recommended a few things. Please schedule a lab only visit in 3-4 weeks to recheck this thyroid test and a few other thyroid labs that can help us in determining how significant the borderline TSH level is. If the labs remain abnormal, I would recommend an office visit to sit down and review this together.   -Kidney function levels, BUN and creatinine, were slightly elevated.  We can sometimes see this after exercise or with GI issues but I am not sure what is causing this for you.  This will need to be rechecked with your follow-up labs.  Please MyChart or call if you have any concerns or questions.   Sincerely,  Jaqui Koch MD

## 2023-05-10 NOTE — RESULT ENCOUNTER NOTE
Syed,  Not sure if you heard from Dr. Moralez or not on his lab work, so I apologize if this is a duplicate.  Cholesterol looks good.  PSA looks just fine as well.  I note that a couple of the liver functions (AST and ALT) are a little bit elevated.  Not bad in the grand scheme of things, and this could certainly be explained by recent use of alcohol or Tylenol.  Maybe even just weight related.  But something we should probably follow-up on in maybe 6 months, just to make sure things are okay.  Again not terribly worried.  Thyroid function was borderline based upon a slightly elevated TSH, but the direct measure of thyroid hormone (T4) was actually normal.  So I think we just keep an eye on this for now.  JAXON Daily M.D.

## 2023-05-11 ENCOUNTER — MYC MEDICAL ADVICE (OUTPATIENT)
Dept: FAMILY MEDICINE | Facility: CLINIC | Age: 58
End: 2023-05-11
Payer: COMMERCIAL

## 2023-05-15 ENCOUNTER — TELEPHONE (OUTPATIENT)
Dept: FAMILY MEDICINE | Facility: CLINIC | Age: 58
End: 2023-05-15
Payer: COMMERCIAL

## 2023-05-15 DIAGNOSIS — G47.33 OSA ON CPAP: Primary | ICD-10-CM

## 2023-06-06 ENCOUNTER — LAB (OUTPATIENT)
Dept: LAB | Facility: CLINIC | Age: 58
End: 2023-06-06
Payer: COMMERCIAL

## 2023-06-06 DIAGNOSIS — R89.9 ABNORMAL LABORATORY TEST RESULT: ICD-10-CM

## 2023-06-06 LAB
ALBUMIN SERPL BCG-MCNC: 4.8 G/DL (ref 3.5–5.2)
ALP SERPL-CCNC: 87 U/L (ref 40–129)
ALT SERPL W P-5'-P-CCNC: 67 U/L (ref 10–50)
ANION GAP SERPL CALCULATED.3IONS-SCNC: 14 MMOL/L (ref 7–15)
AST SERPL W P-5'-P-CCNC: 36 U/L (ref 10–50)
BILIRUB SERPL-MCNC: 0.6 MG/DL
BUN SERPL-MCNC: 21.6 MG/DL (ref 8–23)
CALCIUM SERPL-MCNC: 10.4 MG/DL (ref 8.6–10)
CHLORIDE SERPL-SCNC: 101 MMOL/L (ref 98–107)
CREAT SERPL-MCNC: 1.22 MG/DL (ref 0.67–1.17)
DEPRECATED HCO3 PLAS-SCNC: 24 MMOL/L (ref 22–29)
GFR SERPL CREATININE-BSD FRML MDRD: 68 ML/MIN/1.73M2
GLUCOSE SERPL-MCNC: 104 MG/DL (ref 70–99)
HOLD SPECIMEN: NORMAL
POTASSIUM SERPL-SCNC: 3.8 MMOL/L (ref 3.4–5.3)
PROT SERPL-MCNC: 8 G/DL (ref 6.4–8.3)
SODIUM SERPL-SCNC: 139 MMOL/L (ref 136–145)
T4 FREE SERPL-MCNC: 1.13 NG/DL (ref 0.9–1.7)
TSH SERPL DL<=0.005 MIU/L-ACNC: 4.57 UIU/ML (ref 0.3–4.2)

## 2023-06-06 PROCEDURE — 86800 THYROGLOBULIN ANTIBODY: CPT

## 2023-06-06 PROCEDURE — 36415 COLL VENOUS BLD VENIPUNCTURE: CPT

## 2023-06-06 PROCEDURE — 84443 ASSAY THYROID STIM HORMONE: CPT

## 2023-06-06 PROCEDURE — 80053 COMPREHEN METABOLIC PANEL: CPT

## 2023-06-06 PROCEDURE — 86376 MICROSOMAL ANTIBODY EACH: CPT

## 2023-06-06 PROCEDURE — 84439 ASSAY OF FREE THYROXINE: CPT

## 2023-06-07 ENCOUNTER — MYC MEDICAL ADVICE (OUTPATIENT)
Dept: FAMILY MEDICINE | Facility: CLINIC | Age: 58
End: 2023-06-07
Payer: COMMERCIAL

## 2023-06-07 DIAGNOSIS — E83.52 SERUM CALCIUM ELEVATED: ICD-10-CM

## 2023-06-07 DIAGNOSIS — R89.9 ABNORMAL LABORATORY TEST RESULT: ICD-10-CM

## 2023-06-07 DIAGNOSIS — R79.89 ELEVATED LFTS: ICD-10-CM

## 2023-06-07 DIAGNOSIS — E78.5 HYPERLIPIDEMIA LDL GOAL <160: Primary | ICD-10-CM

## 2023-06-07 LAB
THYROGLOB AB SERPL IA-ACNC: <20 IU/ML
THYROPEROXIDASE AB SERPL-ACNC: 21 IU/ML

## 2023-06-08 NOTE — RESULT ENCOUNTER NOTE
Syed,  Thanks for coming in for the repeat labs.    Your thyroid test remains mildly abnormal.  However, the thyroid antibodies are negative making long-term thyroid disease unlikely.  This is good.  For now, no treatment is recommended for the abnormal thyroid test.  This will need to be monitored every 6 to 12 months.    The liver tests are also improving.  The AST is now back to the normal range and the ALT has dropped quite a bit.  This is also good.  Given improving numbers, I think we can hold off on further evaluation at this time but continue to monitor.  Keep your alcohol intake limited and keep working on healthy diet.  Many times losing weight can significantly help the health of the liver.    Incidentally, your calcium level was slightly abnormal this time.  Calcium levels can fluctuate quite a bit with the amount of protein in the bloodstream but can sometimes be abnormal from other causes (medications, hormone levels, etc.).  Typically when the calcium is elevated our first step is to just repeat the lab.     Given we are now tracking several things, lets go ahead and have you repeat blood work in 3 months to recheck the kidney, liver and electrolyte panel and thyroid test.  We may need to do some additional blood work for the elevated calcium and liver tests if they remain elevated (I'll have extra blood drawn in case we need to add on these tests) and get an ultrasound of your liver. I'll keep you posted .    Please MyChart or call if you have any concerns or questions.   Sincerely,  Jaqui Koch MD

## 2023-06-12 DIAGNOSIS — G47.33 OSA ON CPAP: ICD-10-CM

## 2023-06-12 DIAGNOSIS — R53.83 FATIGUE, UNSPECIFIED TYPE: Primary | ICD-10-CM

## 2023-06-12 DIAGNOSIS — R39.9 LOWER URINARY TRACT SYMPTOMS (LUTS): ICD-10-CM

## 2023-06-12 RX ORDER — MODAFINIL 200 MG/1
200 TABLET ORAL DAILY
Qty: 90 TABLET | Refills: 3 | Status: SHIPPED | OUTPATIENT
Start: 2023-06-12 | End: 2024-01-23

## 2023-06-12 RX ORDER — TAMSULOSIN HYDROCHLORIDE 0.4 MG/1
0.4 CAPSULE ORAL DAILY
Qty: 90 CAPSULE | Refills: 3 | Status: SHIPPED | OUTPATIENT
Start: 2023-06-12 | End: 2024-01-26

## 2023-07-17 ENCOUNTER — VIRTUAL VISIT (OUTPATIENT)
Dept: FAMILY MEDICINE | Facility: CLINIC | Age: 58
End: 2023-07-17
Payer: COMMERCIAL

## 2023-07-17 DIAGNOSIS — R79.89 ELEVATED LFTS: ICD-10-CM

## 2023-07-17 DIAGNOSIS — I10 HYPERTENSION GOAL BP (BLOOD PRESSURE) < 140/90: ICD-10-CM

## 2023-07-17 DIAGNOSIS — E66.811 CLASS 1 OBESITY WITH SERIOUS COMORBIDITY IN ADULT, UNSPECIFIED BMI, UNSPECIFIED OBESITY TYPE: Primary | ICD-10-CM

## 2023-07-17 PROCEDURE — 99213 OFFICE O/P EST LOW 20 MIN: CPT | Mod: VID | Performed by: FAMILY MEDICINE

## 2023-07-17 NOTE — PROGRESS NOTES
Syed is a 58 year old who is being evaluated via a billable video visit.      How would you like to obtain your AVS? MyChart  If the video visit is dropped, the invitation should be resent by: Send to e-mail at: [email or web address]  Will anyone else be joining your video visit? No          Assessment & Plan     (E66.9) Class 1 obesity with serious comorbidity in adult, unspecified BMI, unspecified obesity type  (primary encounter diagnosis)  Comment: interested in glp-1 agonist (his wife Ronald uses)  Plan: Reviewed onset of action of meds, common side effects. Discussed typical insurance issues and pharmacy availability issues. He will check with his insurance and message me if this is an option. Reviewed briefly, other options- naltrexone, topiramate, phentermine (would avoid due to his provigil).     (I10) Hypertension goal BP (blood pressure) < 140/90  Comment: high last visit but lost 20+ lbs  Plan: he will message me with home readings. Encouraged continued lifestyle efforts    (R71.89) Elevated LFTs  Comment: improving on recent labs  Plan: repeat labs planned in Sept.        Patient Instructions   Message me with updated blood pressure results when available    Check with your insurance to see if Wegovy (or similar med) is covered for you. Make sure to let them know you would be taking the medication for weight loss and not diabetes.       Jaqui Koch MD  [facility]    Rachel Lynch is a 58 year old, presenting for the following health issues:  Hypertension        7/17/2023     8:05 AM   Additional Questions   Roomed by Carline     History of Present Illness       Hypertension: He presents for follow up of hypertension.  He does not check blood pressure  regularly outside of the clinic. Outside blood pressures have been over 140/90. He does not follow a low salt diet.     He eats 0-1 servings of fruits and vegetables daily.He consumes 2 sweetened beverage(s)  daily.He exercises with enough effort to increase his heart rate 9 or less minutes per day.  He exercises with enough effort to increase his heart rate 3 or less days per week.   He is taking medications regularly.     Feel good overall  Lost wt and dropped to 240 (up to 244) by not eating carbs.   mild fatigue but this is his baseline.     Home bp's                    Objective    Vitals - Patient Reported  Pain Score: No Pain (0)      Vitals:  No vitals were obtained today due to virtual visit.    Physical Exam   GENERAL: Healthy, alert and no distress  EYES: Eyes grossly normal to inspection.  No discharge or erythema, or obvious scleral/conjunctival abnormalities.  RESP: No audible wheeze, cough, or visible cyanosis.  No visible retractions or increased work of breathing.    SKIN: Visible skin clear. No significant rash, abnormal pigmentation or lesions.  NEURO: Cranial nerves grossly intact.  Mentation and speech appropriate for age.  PSYCH: Mentation appears normal, affect normal/bright, judgement and insight intact, normal speech and appearance well-groomed.                Video-Visit Details    Type of service:  Video Visit     Originating Location (pt. Location): Home    Distant Location (provider location):  Off-site  Platform used for Video Visit: [employer]

## 2023-07-17 NOTE — PATIENT INSTRUCTIONS
Message me with updated blood pressure results when available    Check with your insurance to see if Wegovy (or similar med) is covered for you. Make sure to let them know you would be taking the medication for weight loss and not diabetes.

## 2023-08-07 ENCOUNTER — TRANSFERRED RECORDS (OUTPATIENT)
Dept: HEALTH INFORMATION MANAGEMENT | Facility: CLINIC | Age: 58
End: 2023-08-07
Payer: COMMERCIAL

## 2023-08-11 ENCOUNTER — MYC MEDICAL ADVICE (OUTPATIENT)
Dept: FAMILY MEDICINE | Facility: CLINIC | Age: 58
End: 2023-08-11
Payer: COMMERCIAL

## 2023-08-11 DIAGNOSIS — E66.811 CLASS 1 OBESITY WITH SERIOUS COMORBIDITY IN ADULT, UNSPECIFIED BMI, UNSPECIFIED OBESITY TYPE: Primary | ICD-10-CM

## 2023-08-29 ENCOUNTER — TELEPHONE (OUTPATIENT)
Dept: FAMILY MEDICINE | Facility: CLINIC | Age: 58
End: 2023-08-29
Payer: COMMERCIAL

## 2023-08-29 ENCOUNTER — MYC MEDICAL ADVICE (OUTPATIENT)
Dept: FAMILY MEDICINE | Facility: CLINIC | Age: 58
End: 2023-08-29
Payer: COMMERCIAL

## 2023-08-29 NOTE — TELEPHONE ENCOUNTER
See also [employer]t message 8/11/2023. Will create telephone encounter for PA on medication.    DENNISE Reyes  [facility]

## 2023-08-29 NOTE — TELEPHONE ENCOUNTER
See [employer] messages 8/11 and 8/29. Patient will need PA started for Semaglutide-Weight Management (WEGOVY) 0.25 MG/0.5ML pen.     DENNISE Reyes  [facility] Triage

## 2023-08-31 NOTE — TELEPHONE ENCOUNTER
Medication: Semaglutide-Weight Management (WEGOVY) 0.25 MG/0.5ML  Per call to [employer], this medication is a plan exclusion benefit from the pt's plan. No option for a pa.    Please close encounter when finished.    Thank you,  Central Prior Authorization Team  [phone]

## 2023-09-01 NOTE — TELEPHONE ENCOUNTER
[employer] message sent to patient with provider response in 8/29 encounter.    DENNISE Reyes  [facility] Triage

## 2023-09-01 NOTE — TELEPHONE ENCOUNTER
Please update patient that his insurance will not cover the Wegovy and will not allow us to complete a PA

## 2023-09-01 NOTE — TELEPHONE ENCOUNTER
See telephone encounter 8/29/2023 - insurance will not cover.    DENNISE Reyes  [facility] Triage

## 2023-09-05 ENCOUNTER — OFFICE VISIT (OUTPATIENT)
Dept: FAMILY MEDICINE | Facility: CLINIC | Age: 58
End: 2023-09-05
Attending: FAMILY MEDICINE
Payer: COMMERCIAL

## 2023-09-05 DIAGNOSIS — L82.1 SEBORRHEIC KERATOSES: ICD-10-CM

## 2023-09-05 DIAGNOSIS — D18.01 CHERRY ANGIOMA: ICD-10-CM

## 2023-09-05 DIAGNOSIS — L81.4 SOLAR LENTIGINOSIS: ICD-10-CM

## 2023-09-05 DIAGNOSIS — Z12.83 SKIN CANCER SCREENING: ICD-10-CM

## 2023-09-05 DIAGNOSIS — L82.0 INFLAMED SEBORRHEIC KERATOSIS: Primary | ICD-10-CM

## 2023-09-05 DIAGNOSIS — L21.9 SEBORRHEIC DERMATITIS: ICD-10-CM

## 2023-09-05 DIAGNOSIS — D22.9 MULTIPLE PIGMENTED NEVI: ICD-10-CM

## 2023-09-05 PROCEDURE — 99203 OFFICE O/P NEW LOW 30 MIN: CPT | Mod: 25 | Performed by: PHYSICIAN ASSISTANT

## 2023-09-05 PROCEDURE — 17110 DESTRUCTION B9 LES UP TO 14: CPT | Performed by: PHYSICIAN ASSISTANT

## 2023-09-05 RX ORDER — KETOCONAZOLE 20 MG/ML
SHAMPOO TOPICAL
Qty: 120 ML | Refills: 11 | Status: SHIPPED | OUTPATIENT
Start: 2023-09-05 | End: 2024-01-26

## 2023-09-05 RX ORDER — KETOCONAZOLE 20 MG/G
CREAM TOPICAL DAILY
Qty: 60 G | Refills: 3 | Status: SHIPPED | OUTPATIENT
Start: 2023-09-05 | End: 2024-01-26

## 2023-09-05 ASSESSMENT — PAIN SCALES - GENERAL: PAINLEVEL: NO PAIN (0)

## 2023-09-05 NOTE — PROGRESS NOTES
[facility] Dermatology Note  Encounter Date: Sep 5, 2023  Office Visit     Dermatology Problem List:  1.  I SK left elbow, status post cryo  2.  Seborrheic dermatitis  ____________________________________________    Assessment & Plan:     #Inflamed seborrheic keratosis, left elbow  As this is bothersome and irritating to the patient, will treat in the office with cryotherapy  -See procedure below    #Seborrheic dermatitis, face, ears and scalp.  Excoriated on the scalp. Chronic, not well controlled.   -  Start Ketoconazole Shampoo 2% daily to scalp and rinse.    -Recommend using CLN wash once weekly for scalp treatment.  This contains a small amount of bleach which helps as an antistaph measure.  -Continue fluocinonide 0.05% solution twice daily to affected areas as needed    For the face and ears, start ketoconazole 2% cream daily    # Skin cancer screening with multiple benign nevi, solar lentigines  - ABCDEs: Counseled ABCDEs of melanoma: Asymmetry, Border (irregularity), Color (not uniform, changes in color), Diameter (greater than 6 mm which is about the size of a pencil eraser), and Evolving (any changes in preexisting moles).  - Sun protection: Counseled SPF30+ sunscreen, UPF clothing, sun avoidance, tanning bed avoidance.    # Cherry angiomas and seborrheic keratoses,  Benign, reassurance given.        Procedures Performed:   - Cryotherapy procedure note, location(s): left elbow. After verbal consent and discussion of risks and benefits including, but not limited to, dyspigmentation/scar, blister, and pain, 1 lesion(s) was(were) treated with 1-2 mm freeze border for 1-2 cycles with liquid nitrogen. Post cryotherapy instructions were provided.      Follow-up: 1 year(s) in-person, or earlier for new or changing lesions    Staff:     All risks, benefits and alternatives were discussed with patient.  Patient is in agreement and understands the assessment and plan.  All questions were  answered.  Sun Screen Education was given.   Return to Clinic in 1 year or sooner as needed.   Shawanda Benavides PA-C    ____________________________________________    CC: Skin Check (FBSE, c/o spot on left elbow, itchy scalp)    HPI:  Mr. Syed Terrell is a(n) 58 year old male who presents today as a new patient for a skin cancer screening.  No personal or family history of skin cancer.      He states he have a bothersome growth on his left elbow.  Was treated once without resolution.  He finds himself wanting to pick at the area.    Additionally he has an itchy scalp.  He remembers developing dandruff in college.  He washes his scalp daily with a body wash.  He has been prescribed fluocinonide solution which helps at times but he is not consistent with it.  He also notices some flaking of his ears on his face.  He recalls his father having similar skin issues.    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    Patient is otherwise feeling well, without additional skin concerns.     Labs Reviewed:  N/A    Physical Exam:  Vitals: There were no vitals taken for this visit.  SKIN: Full skin, which includes the head/face, both arms, chest, back, abdomen,both legs, genitalia and/or groin buttocks, digits and/or nails, was examined.  - There are dome shaped bright red papules on the trunk.   Multiple regular brown pigmented macules and papules are identified on the trunk and extremities.   There is macular erythema of the hairbearing areas of the scalp, jace bowls and glabella with mild flaky white scale.  Scattered brown macules on sun exposed areas.  There are waxy stuck on tan to brown papules on the trunk and extremities.  There is a tan to brown waxy stuck on papule with surrounding erythema on the left elbow. .   - No other lesions of concern on areas examined.      Medications:  Current Outpatient Medications   Medication    aspirin (ASA) 81 MG chewable tablet    atorvastatin (LIPITOR) 40 MG tablet    buPROPion (WELLBUTRIN XL) 300 MG 24 hr tablet    Cholecalciferol (VITAMIN D) 2000 UNITS CAPS    fluocinonide (LIDEX) 0.05 % external solution    hydrochlorothiazide (HYDRODIURIL) 25 MG tablet    modafinil (PROVIGIL) 200 MG tablet    Semaglutide-Weight Management (WEGOVY) 0.25 MG/0.5ML pen    Semaglutide-Weight Management (WEGOVY) 0.5 MG/0.5ML pen    sildenafil (VIAGRA) 50 MG tablet    tamsulosin (FLOMAX) 0.4 MG capsule     No current facility-administered medications for this visit.      Past Medical History:   Patient Active Problem List   Diagnosis    CARDIOVASCULAR SCREENING; LDL GOAL LESS THAN 160    Colon polyp    GERD (gastroesophageal reflux disease)    Moderate episode of recurrent major depressive disorder (H)    Hyperlipidemia LDL goal <130    VANESSA on CPAP    Psoriasis    Needle phobia    Hypertension goal BP (blood pressure) < 140/90    Atypical chest pain    Status post coronary angiogram    Abnormal stress test    Fatigue, unspecified type    Class 1 obesity with serious comorbidity and body mass index (BMI) of 33.0 to 33.9 in adult, unspecified obesity type     Past Medical History:   Diagnosis Date    Colon polyp 2010    Repeat colonoscopy 2015    Depressive disorder Long Term    FH: colon cancer     Hyperlipidemia LDL goal <160     Hypertension     Moderate major depression (H) 6/12/2013    Needle phobia     VANESSA on CPAP     could not tolerate CPAP       CC Jaqui Koch MD  [address] [place] [address] on close of this encounter.

## 2023-09-05 NOTE — PATIENT INSTRUCTIONS
WOUND CARE INSTRUCTIONS  FOR CRYOSURGERY  For questions please call [phone]        This area treated with liquid nitrogen will form a blister. You do not need to bandage the area until after the blister forms and breaks (which may be a few days).  When the blister breaks, begin daily dressing changes as follows:    1) Clean and dry the area with tap water using clean Q-tip or sterile gauze pad.    2) Apply Vaseline or Aquaphor over entire wound. Other options include Polysporin ointment or Bacitracin ointment over entire wound.  Do NOT use Neosporin ointment.    3) Cover the wound with a band-aid or sterile non-stick gauze pad and micropore paper tape.      REPEAT THESE INSTRUCTIONS AT LEAST ONCE A DAY UNTIL THE WOUND HAS COMPLETELY HEALED.        It is an old wives tale that a wound heals better when it is exposed to air and allowed to dry out. The wound will heal faster with a better cosmetic result if it is kept moist with ointment and covered with a bandage.  Do not let the wound dry out.      Supplies Needed:     *Cotton tipped applicators (Q-tips)   *Polysporin ointment or Bacitracin ointment (NOT NEOSPORIN)   *Band-aids, or non stick gauze pads and micropore paper tape    PATIENT INFORMATION    During the healing process you will notice a number of changes. All wounds develop a small halo of redness surrounding the wound.  This means healing is occurring. Severe itching with extensive redness usually indicates sensitivity to the ointment or bandage tape used to dress the wound.  You should call our office if this develops.      Swelling and/or discoloration around your surgical site is common, particularly when performed around the eye.    All wounds normally drain.  The larger the wound the more drainage there will be.  After 7-10 days, you will notice the wound beginning to shrink and new skin will begin to grow.  The wound is healed when you can see skin has formed over the entire area.  A healed  wound has a healthy, shiny look to the surface and is red to dark pink in color to normalize.  Wounds may take approximately 4-6 weeks to heal.  Larger wounds may take 6-8 weeks.  After the wound is healed you may discontinue dressing changes.    You may experience a sensation of tightness as your wound heals. This is normal and will gradually subside.    Your healed wound may be sensitive to temperature changes. This sensitivity improves with time, but if you re having a lot of discomfort, try to avoid temperature extremes.    Patients frequently experience itching after their wound appears to have healed because of the continue healing under the skin.  Plain Vaseline will help relieve the itching.          Patient Education       Proper skin care from [facility]:    -Eliminate harsh soaps as they strip the natural oils from the skin, often resulting in dry itchy skin ( i.e. Dial, Zest, Mongolian Spring)  -Use mild soaps such as Cetaphil or Dove Sensitive Skin in the shower. You do not need to use soap on arms, legs, and trunk every time you shower unless visibly soiled.   -Avoid hot or cold showers.  -After showering, lightly dry off and apply moisturizing within 2-3 minutes. This will help trap moisture in the skin.   -Aggressive use of a moisturizer at least 1-2 times a day to the entire body (including -Vanicream, Cetaphil, Aquaphor or Cerave) and moisturize hands after every washing.  -We recommend using moisturizers that come in a tub that needs to be scooped out, not a pump. This has more of an oil base. It will hold moisture in your skin much better than a water base moisturizer. The above recommended are non-pore clogging.      Wear a sunscreen with at least SPF 30 on your face, ears, neck and V of the chest daily. Wear sunscreen on other areas of the body if those areas are exposed to the sun throughout the day. Sunscreens can contain physical and/or chemical blockers. Physical blockers are less  likely to clog pores, these include zinc oxide and titanium dioxide. Reapply every two hour and after swimming.     Sunscreen examples: https://www.ewg.org/sunscreen/    UV radiation  UVA radiation remains constant throughout the day and throughout the year. It is a longer wavelength than UVB and therefore penetrates deeper into the skin leading to immediate and delayed tanning, photoaging, and skin cancer. 70-80% of UVA and UVB radiation occurs between the hours of 10am-2pm.  UVB radiation  UVB radiation causes the most harmful effects and is more significant during the summer months. However, snow and ice can reflect UVB radiation leading to skin damage during the winter months as well. UVB radiation is responsible for tanning, burning, inflammation, delayed erythema (pinkness), pigmentation (brown spots), and skin cancer.     I recommend self monthly full body exams and yearly full body exams with a dermatology provider. If you develop a new or changing lesion please follow up for examination. Most skin cancers are pink and scaly or pink and pearly. However, we do see blue/brown/black skin cancers.  Consider the ABCDEs of melanoma when giving yourself your monthly full body exam ( don't forget the groin, buttocks, feet, toes, etc). A-asymmetry, B-borders, C-color, D-diameter, E-elevation or evolving. If you see any of these changes please follow up in clinic. If you cannot see your back I recommend purchasing a hand held mirror to use with a larger wall mirror.       Checking for Skin Cancer  You can find cancer early by checking your skin each month. There are 3 kinds of skin cancer. They are melanoma, basal cell carcinoma, and squamous cell carcinoma. Doing monthly skin checks is the best way to find new marks or skin changes. Follow the instructions below for checking your skin.   The ABCDEs of checking moles for melanoma   Check your moles or growths for signs of melanoma using ABCDE:   Asymmetry: the sides  of the mole or growth don t match  Border: the edges are ragged, notched, or blurred  Color: the color within the mole or growth varies  Diameter: the mole or growth is larger than 6 mm (size of a pencil eraser)  Evolving: the size, shape, or color of the mole or growth is changing (evolving is not shown in the images below)    Checking for other types of skin cancer  Basal cell carcinoma or squamous cell carcinoma have symptoms such as:     A spot or mole that looks different from all other marks on your skin  Changes in how an area feels, such as itching, tenderness, or pain  Changes in the skin's surface, such as oozing, bleeding, or scaliness  A sore that does not heal  New swelling or redness beyond the border of a mole    Who s at risk?  Anyone can get skin cancer. But you are at greater risk if you have:   Fair skin, light-colored hair, or light-colored eyes  Many moles or abnormal moles on your skin  A history of sunburns from sunlight or tanning beds  A family history of skin cancer  A history of exposure to radiation or chemicals  A weakened immune system  If you have had skin cancer in the past, you are at risk for recurring skin cancer.   How to check your skin  Do your monthly skin checkups in front of a full-length mirror. Check all parts of your body, including your:   Head (ears, face, neck, and scalp)  Torso (front, back, and sides)  Arms (tops, undersides, upper, and lower armpits)  Hands (palms, backs, and fingers, including under the nails)  Buttocks and genitals  Legs (front, back, and sides)  Feet (tops, soles, toes, including under the nails, and between toes)  If you have a lot of moles, take digital photos of them each month. Make sure to take photos both up close and from a distance. These can help you see if any moles change over time.   Most skin changes are not cancer. But if you see any changes in your skin, call your doctor right away. Only he or she can diagnose a problem. If you have  skin cancer, seeing your doctor can be the first step toward getting the treatment that could save your life.   [employer] last reviewed this educational content on 4/1/2019 2000-2020 [employer] [address] [place] [address], [place] [address]. All rights reserved. This information is not intended as a substitute for professional medical care. Always follow your healthcare professional's instructions.       When should I call my doctor?  If you are worsening or not improving, please, contact us or seek urgent care as noted below.     Who should I call with questions (adults)?  [facility] (adult and pediatric): [phone]  [facility] (adult): [phone]  [facility] ([facility] and [place]) [phone]  For urgent needs outside of business hours call the [facility] at [phone] and ask for the dermatology resident on call to be paged  If this is a medical emergency and you are unable to reach an ER, Call [phone]      If you need a prescription refill, please contact your pharmacy. Refills are approved or denied by our Physicians during normal business hours, Monday through Fridays  Per office policy, refills will not be granted if you have not been seen within the past year (or sooner depending on your child's condition)

## 2023-09-05 NOTE — LETTER
9/5/2023         RE: Syed Terrell  [address]  [place] [address]        Dear Colleague,    Thank you for referring your patient, Syed Terrell, to the [facility]. Please see a copy of my visit note below.    [facility] Dermatology Note  Encounter Date: Sep 5, 2023  Office Visit     Dermatology Problem List:  1.  I SK left elbow, status post cryo  2.  Seborrheic dermatitis  ____________________________________________    Assessment & Plan:     #Inflamed seborrheic keratosis, left elbow  As this is bothersome and irritating to the patient, will treat in the office with cryotherapy  -See procedure below    #Seborrheic dermatitis, face, ears and scalp.  Excoriated on the scalp. Chronic, not well controlled.   -  Start Ketoconazole Shampoo 2% daily to scalp and rinse.    -Recommend using CLN wash once weekly for scalp treatment.  This contains a small amount of bleach which helps as an antistaph measure.  -Continue fluocinonide 0.05% solution twice daily to affected areas as needed    For the face and ears, start ketoconazole 2% cream daily    # Skin cancer screening with multiple benign nevi, solar lentigines  - ABCDEs: Counseled ABCDEs of melanoma: Asymmetry, Border (irregularity), Color (not uniform, changes in color), Diameter (greater than 6 mm which is about the size of a pencil eraser), and Evolving (any changes in preexisting moles).  - Sun protection: Counseled SPF30+ sunscreen, UPF clothing, sun avoidance, tanning bed avoidance.    # Cherry angiomas and seborrheic keratoses,  Benign, reassurance given.        Procedures Performed:   - Cryotherapy procedure note, location(s): left elbow. After verbal consent and discussion of risks and benefits including, but not limited to, dyspigmentation/scar, blister, and pain, 1 lesion(s) was(were) treated with 1-2 mm freeze border for 1-2 cycles with liquid nitrogen. Post cryotherapy instructions were  provided.      Follow-up: 1 year(s) in-person, or earlier for new or changing lesions    Staff:     All risks, benefits and alternatives were discussed with patient.  Patient is in agreement and understands the assessment and plan.  All questions were answered.  Sun Screen Education was given.   Return to Clinic in 1 year or sooner as needed.   Shawanda Benavides PA-C    ____________________________________________    CC: Skin Check (FBSE, c/o spot on left elbow, itchy scalp)    HPI:  Mr. Syed Terrell is a(n) 58 year old male who presents today as a new patient for a skin cancer screening.  No personal or family history of skin cancer.      He states he have a bothersome growth on his left elbow.  Was treated once without resolution.  He finds himself wanting to pick at the area.    Additionally he has an itchy scalp.  He remembers developing dandruff in college.  He washes his scalp daily with a body wash.  He has been prescribed fluocinonide solution which helps at times but he is not consistent with it.  He also notices some flaking of his ears on his face.  He recalls his father having similar skin issues.    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    Patient is otherwise feeling well, without additional skin concerns.     Labs Reviewed:  N/A    Physical Exam:  Vitals: There were no vitals taken for this visit.  SKIN: Full skin, which includes the head/face, both arms, chest, back, abdomen,both legs, genitalia and/or groin buttocks, digits and/or nails, was examined.  - There are dome shaped bright red papules on the trunk.   Multiple regular brown pigmented macules and papules are identified on the trunk and extremities.   There is macular erythema of the hairbearing areas of the scalp, jace bowls and glabella with mild flaky white scale.  Scattered brown  macules on sun exposed areas.  There are waxy stuck on tan to brown papules on the trunk and extremities.  There is a tan to brown waxy stuck on papule with surrounding erythema on the left elbow. .   - No other lesions of concern on areas examined.     Medications:  Current Outpatient Medications   Medication     aspirin (ASA) 81 MG chewable tablet     atorvastatin (LIPITOR) 40 MG tablet     buPROPion (WELLBUTRIN XL) 300 MG 24 hr tablet     Cholecalciferol (VITAMIN D) 2000 UNITS CAPS     fluocinonide (LIDEX) 0.05 % external solution     hydrochlorothiazide (HYDRODIURIL) 25 MG tablet     modafinil (PROVIGIL) 200 MG tablet     Semaglutide-Weight Management (WEGOVY) 0.25 MG/0.5ML pen     Semaglutide-Weight Management (WEGOVY) 0.5 MG/0.5ML pen     sildenafil (VIAGRA) 50 MG tablet     tamsulosin (FLOMAX) 0.4 MG capsule     No current facility-administered medications for this visit.      Past Medical History:   Patient Active Problem List   Diagnosis     CARDIOVASCULAR SCREENING; LDL GOAL LESS THAN 160     Colon polyp     GERD (gastroesophageal reflux disease)     Moderate episode of recurrent major depressive disorder (H)     Hyperlipidemia LDL goal <130     VANESSA on CPAP     Psoriasis     Needle phobia     Hypertension goal BP (blood pressure) < 140/90     Atypical chest pain     Status post coronary angiogram     Abnormal stress test     Fatigue, unspecified type     Class 1 obesity with serious comorbidity and body mass index (BMI) of 33.0 to 33.9 in adult, unspecified obesity type     Past Medical History:   Diagnosis Date     Colon polyp 2010    Repeat colonoscopy 2015     Depressive disorder Long Term     FH: colon cancer      Hyperlipidemia LDL goal <160      Hypertension      Moderate major depression (H) 6/12/2013     Needle phobia      VANESSA on CPAP     could not tolerate CPAP       CC Jaqui Koch MD  [address] [place] [address] on close of this encounter.       Again, thank you for  allowing me to participate in the care of your patient.        Sincerely,        Shawanda Benavides PA-C

## 2023-09-05 NOTE — PROGRESS NOTES
[facility] Dermatology Note  Encounter Date: Sep 5, 2023  Office Visit     Dermatology Problem List:  1. ***    ____________________________________________    Assessment & Plan:     # ***.   {kkplans:470099}   - ***     # ***.   {kkplans:896238}   - ***     Procedures Performed:   {kkprocedurenotes:308822}  {kkprocedurenotes:251040}    Follow-up: {kkfollowup:163996}    {kkstaffinvolved:425981}    ***  ____________________________________________    CC: Skin Check (FBSE, c/o spot on left elbow, itchy scalp)    HPI:  Mr. Syed Terrell is a(n) 58 year old male who presents today {kknew/return:825866} for ***  Washed daily, uses fluocinonide solution prn. Helpful at time.   Patient is otherwise feeling well, without additional skin concerns.     Labs Reviewed:  ***N/A    Physical Exam:  Vitals: There were no vitals taken for this visit.  SKIN: {kkSkinExam:175760}  - ***  - {Skin Exam Derm:011841}.   - {Skin Exam Derm:933553}.   - {Skin Exam Derm:204854}.   - No other lesions of concern on areas examined.     Medications:  Current Outpatient Medications   Medication    aspirin (ASA) 81 MG chewable tablet    atorvastatin (LIPITOR) 40 MG tablet    buPROPion (WELLBUTRIN XL) 300 MG 24 hr tablet    Cholecalciferol (VITAMIN D) 2000 UNITS CAPS    fluocinonide (LIDEX) 0.05 % external solution    hydrochlorothiazide (HYDRODIURIL) 25 MG tablet    modafinil (PROVIGIL) 200 MG tablet    Semaglutide-Weight Management (WEGOVY) 0.25 MG/0.5ML pen    Semaglutide-Weight Management (WEGOVY) 0.5 MG/0.5ML pen    sildenafil (VIAGRA) 50 MG tablet    tamsulosin (FLOMAX) 0.4 MG capsule     No current facility-administered medications for this visit.      Past Medical History:   Patient Active Problem List   Diagnosis    CARDIOVASCULAR SCREENING; LDL GOAL LESS THAN 160    Colon polyp    GERD (gastroesophageal reflux disease)    Moderate episode of recurrent major depressive disorder (H)    Hyperlipidemia LDL goal <130    VANESSA  on CPAP    Psoriasis    Needle phobia    Hypertension goal BP (blood pressure) < 140/90    Atypical chest pain    Status post coronary angiogram    Abnormal stress test    Fatigue, unspecified type    Class 1 obesity with serious comorbidity and body mass index (BMI) of 33.0 to 33.9 in adult, unspecified obesity type     Past Medical History:   Diagnosis Date    Colon polyp 2010    Repeat colonoscopy 2015    Depressive disorder Long Term    FH: colon cancer     Hyperlipidemia LDL goal <160     Hypertension     Moderate major depression (H) 6/12/2013    Needle phobia     VANESSA on CPAP     could not tolerate CPAP       CC Jaqui Koch MD  [address] [place] [address] on close of this encounter.

## 2023-09-06 ENCOUNTER — LAB (OUTPATIENT)
Dept: LAB | Facility: CLINIC | Age: 58
End: 2023-09-06
Payer: COMMERCIAL

## 2023-09-06 DIAGNOSIS — R89.9 ABNORMAL LABORATORY TEST RESULT: ICD-10-CM

## 2023-09-06 DIAGNOSIS — E83.52 SERUM CALCIUM ELEVATED: ICD-10-CM

## 2023-09-06 DIAGNOSIS — R79.89 ELEVATED LFTS: ICD-10-CM

## 2023-09-06 LAB
ALBUMIN SERPL BCG-MCNC: 4.5 G/DL (ref 3.5–5.2)
ALP SERPL-CCNC: 91 U/L (ref 40–129)
ALT SERPL W P-5'-P-CCNC: 61 U/L (ref 0–70)
ANION GAP SERPL CALCULATED.3IONS-SCNC: 11 MMOL/L (ref 7–15)
AST SERPL W P-5'-P-CCNC: 29 U/L (ref 0–45)
BILIRUB SERPL-MCNC: 0.5 MG/DL
BUN SERPL-MCNC: 16.3 MG/DL (ref 8–23)
CALCIUM SERPL-MCNC: 9.6 MG/DL (ref 8.6–10)
CHLORIDE SERPL-SCNC: 104 MMOL/L (ref 98–107)
CREAT SERPL-MCNC: 1.34 MG/DL (ref 0.67–1.17)
DEPRECATED HCO3 PLAS-SCNC: 26 MMOL/L (ref 22–29)
EGFRCR SERPLBLD CKD-EPI 2021: 61 ML/MIN/1.73M2
GLUCOSE SERPL-MCNC: 108 MG/DL (ref 70–99)
HOLD SPECIMEN: NORMAL
POTASSIUM SERPL-SCNC: 4.4 MMOL/L (ref 3.4–5.3)
PROT SERPL-MCNC: 6.9 G/DL (ref 6.4–8.3)
SODIUM SERPL-SCNC: 141 MMOL/L (ref 136–145)
T4 FREE SERPL-MCNC: 1.16 NG/DL (ref 0.9–1.7)
TSH SERPL DL<=0.005 MIU/L-ACNC: 5.1 UIU/ML (ref 0.3–4.2)

## 2023-09-06 PROCEDURE — 84439 ASSAY OF FREE THYROXINE: CPT

## 2023-09-06 PROCEDURE — 80053 COMPREHEN METABOLIC PANEL: CPT

## 2023-09-06 PROCEDURE — 36415 COLL VENOUS BLD VENIPUNCTURE: CPT

## 2023-09-06 PROCEDURE — 84443 ASSAY THYROID STIM HORMONE: CPT

## 2023-09-08 DIAGNOSIS — R89.9 ABNORMAL LABORATORY TEST RESULT: Primary | ICD-10-CM

## 2023-09-08 NOTE — RESULT ENCOUNTER NOTE
Syed,  The kidney, liver and electrolyte panel looks good.  The liver AST test continues to be improved.  Your thyroid test continues to be mildly elevated but has not changed significantly from the last few levels.  No treatment is needed at this time but we will continue to monitor every 3 to 6 months.  I will have standing orders in place.  Please MyChart or call if you have any concerns or questions.   Sincerely,  Jaqui Koch MD

## 2023-09-17 DIAGNOSIS — I10 BENIGN HYPERTENSION: ICD-10-CM

## 2023-09-18 RX ORDER — HYDROCHLOROTHIAZIDE 25 MG/1
TABLET ORAL
Qty: 90 TABLET | Refills: 2 | Status: SHIPPED | OUTPATIENT
Start: 2023-09-18 | End: 2024-06-14

## 2023-10-20 ENCOUNTER — MYC MEDICAL ADVICE (OUTPATIENT)
Dept: FAMILY MEDICINE | Facility: CLINIC | Age: 58
End: 2023-10-20
Payer: COMMERCIAL

## 2023-10-20 DIAGNOSIS — L60.8 TOENAIL DEFORMITY: Primary | ICD-10-CM

## 2023-11-01 ENCOUNTER — OFFICE VISIT (OUTPATIENT)
Dept: PODIATRY | Facility: CLINIC | Age: 58
End: 2023-11-01
Payer: COMMERCIAL

## 2023-11-01 VITALS — HEART RATE: 80 BPM | SYSTOLIC BLOOD PRESSURE: 146 MMHG | DIASTOLIC BLOOD PRESSURE: 80 MMHG

## 2023-11-01 DIAGNOSIS — L60.8 TOENAIL DEFORMITY: ICD-10-CM

## 2023-11-01 DIAGNOSIS — S91.209A AVULSION OF TOENAIL, INITIAL ENCOUNTER: ICD-10-CM

## 2023-11-01 DIAGNOSIS — B35.1 ONYCHOMYCOSIS: Primary | ICD-10-CM

## 2023-11-01 PROCEDURE — 11720 DEBRIDE NAIL 1-5: CPT | Performed by: PODIATRIST

## 2023-11-01 PROCEDURE — 99204 OFFICE O/P NEW MOD 45 MIN: CPT | Mod: 25 | Performed by: PODIATRIST

## 2023-11-01 RX ORDER — CICLOPIROX 80 MG/ML
SOLUTION TOPICAL DAILY
Qty: 6 ML | Refills: 4 | Status: SHIPPED | OUTPATIENT
Start: 2023-11-01 | End: 2024-01-26

## 2023-11-01 NOTE — PATIENT INSTRUCTIONS
We wish you continued good healing. If you have any questions or concerns, please do not hesitate to contact us at  [phone]    [employer] (secure e-mail communication and access to your chart) to send a message or to make an appointment.    Please remember to call and schedule a follow up appointment if one was recommended at your earliest convenience.     PODIATRY CLINIC HOURS  TELEPHONE NUMBER    Dr. Dejan COREASPARSENIO [facility]        Clinics:  [place]  Tuesday 1PM-6PM  [place]  Wednesday 745AM-330PM  [place]  Monday 2nd,4th  830AM-4PM  Thursday/Friday 745AM-230PM     [facility] APPOINTMENTS  [phone]    [place] APPOINTMENTS  [phone]          If you need a medication refill, please contact us you may need lab work and/or a follow up visit prior to your refill (i.e. Antifungal medications).  If MRI needed please call Imaging at [phone]   HOW DO I GET MY KNEE SCOOTER? Knee scooters can be picked up at ANY Medical Supply stores with your knee scooter Prescription.  OR  Bring your signed prescription to an [facility] showroom.   Set up an appointment for your custom Orthotics. Call any Orthotics locations call [phone]         [facility] use daily on nails  Remove with rubbing alcohol every 7 days

## 2023-11-01 NOTE — LETTER
11/1/2023         RE: Syed Terrell  [address]  [facility] [address]        Dear Colleague,    Thank you for referring your patient, Syed Terrell, to the [facility]. Please see a copy of my visit note below.    Patient seen today in consult from Dr. Koch and complains of thick right and left first toenails(s) .  Has had this for many years.  It is slowly getting worse.  Has had pain in the past which is aggravated by activity and relieved by rest.  Has lost both hallux nails in the past.  Recently lost the right nail.  He has a new 1 growing in.  Denies pain erythema edema or drainage from the right hallux nail.  Tried over the counter medications without success.  Does not like the look of the nail and is wondering about options.  Patient has history of elevated LFTs.  He has history of psoriasis.  He has never smoked.  Family history of cancer.    ROS:  A 10-point review of systems was performed and is positive for that noted in the HPI and as seen below.  All other areas are negative.          Allergies   Allergen Reactions     No Known Drug Allergy        Current Outpatient Medications   Medication Sig Dispense Refill     aspirin (ASA) 81 MG chewable tablet Take 81 mg by mouth daily       atorvastatin (LIPITOR) 40 MG tablet Take 1 tablet (40 mg) by mouth daily 90 tablet 3     buPROPion (WELLBUTRIN XL) 300 MG 24 hr tablet TAKE 1 TABLET EVERY MORNING 90 tablet 1     Cholecalciferol (VITAMIN D) 2000 UNITS CAPS Take by mouth five times a week        fluocinonide (LIDEX) 0.05 % external solution Apply topically 2 times daily 60 mL 1     hydrochlorothiazide (HYDRODIURIL) 25 MG tablet TAKE 1 TABLET DAILY FOR HIGH BLOOD PRESSURE 90 tablet 2     ketoconazole (NIZORAL) 2 % external cream Apply topically daily To the affected areas on the face and ears. 60 g 3     ketoconazole (NIZORAL) 2 % external shampoo Use daily to wash the scalp. Let sit on wet scalp x 3-5 min  120 mL 11     modafinil (PROVIGIL) 200 MG tablet Take 1 tablet (200 mg) by mouth daily 90 tablet 3     Semaglutide-Weight Management (WEGOVY) 0.25 MG/0.5ML pen Inject 0.25 mg Subcutaneous once a week Increase dose to 0.5 mg once a week after 4 weeks if tolerating 2 mL 0     Semaglutide-Weight Management (WEGOVY) 0.5 MG/0.5ML pen Inject 0.5 mg Subcutaneous once a week 2 mL 0     sildenafil (VIAGRA) 50 MG tablet Take 1 tablet (50 mg) by mouth daily as needed 6 tablet 11     tamsulosin (FLOMAX) 0.4 MG capsule Take 1 capsule (0.4 mg) by mouth daily 90 capsule 3       Patient Active Problem List   Diagnosis     CARDIOVASCULAR SCREENING; LDL GOAL LESS THAN 160     Colon polyp     GERD (gastroesophageal reflux disease)     Moderate episode of recurrent major depressive disorder (H)     Hyperlipidemia LDL goal <130     VANESSA on CPAP     Psoriasis     Needle phobia     Hypertension goal BP (blood pressure) < 140/90     Atypical chest pain     Status post coronary angiogram     Abnormal stress test     Fatigue, unspecified type     Class 1 obesity with serious comorbidity and body mass index (BMI) of 33.0 to 33.9 in adult, unspecified obesity type       Past Medical History:   Diagnosis Date     Colon polyp 2010    Repeat colonoscopy 2015     Depressive disorder Long Term     FH: colon cancer      Hyperlipidemia LDL goal <160      Hypertension      Moderate major depression (H) 6/12/2013     Needle phobia      VANESSA on CPAP     could not tolerate CPAP       Past Surgical History:   Procedure Laterality Date     COLONOSCOPY  2020     CV HEART CATHETERIZATION WITH POSSIBLE INTERVENTION N/A 1/17/2020    Procedure: Coronary Angiogram;  Surgeon: Patricia Gomez MD;  Location:  HEART CARDIAC CATH LAB       Family History   Problem Relation Age of Onset     Cancer - colorectal Maternal Grandfather 70     Gastrointestinal Disease Mother         colon polyps     Depression Mother         Chito VELIZ Paternal Grandfather          MI age 65     Prostate Cancer Father 78     Other Cancer Father         Bladder       Social History     Tobacco Use     Smoking status: Never     Smokeless tobacco: Never   Substance Use Topics     Alcohol use: Yes     Alcohol/week: 0.0 - 1.0 standard drinks of alcohol     Comment: 2 drinks per week         BP (!) 146/80   Pulse 80 .      VConstitutional/ general:  Pt is in no apparent distress, appears well-nourished.  Cooperative with history and physical exam.     Psych:  The patient answered questions appropriately.  Normal affect.  Seems to have reasonable expectations, in terms of treatment.    Eyes:  Visual scanning/ tracking without deficit.    Ears:  Response to auditory stimuli is normal.  negative hearing aid devices.  Auricles in proper alignment.     Lymphatic:  Popliteal lymph nodes not enlarged.     Lungs:  Non labored breathing, non labored speech. No cough.  No audible wheezing. Even, quiet breathing.       Vascular:  Pedal pulses are palpable bilaterally for both the DP and PT arteries.  CFT < 3 sec. ankle edema noted.  Pedal hair growth noted.     Neuro:  Alert and oriented x 3. Coordinated gait.  Light touch sensation is intact to the L4, L5, S1 distributions. No obvious deficits.  No evidence of neurological-based weakness, spasticity, or contracture in the lower extremities.    Derm: Normal texture and turgor.  No erythema, ecchymosis, or cyanosis.  No open lesions. left first toenails(s)  thickened, elongated, discolored, with subungual debris.  The distal half is not attached.  This nail is quite long.  No erythema, edema, drainage, pain on palpation.  No signs of subungual masses or exostosis.  The right hallux nail is absent new nail growing in approximately a third of the way.  New nail growing and appears normal.  All lesser nails are elongated.  Left second nail is loose and easily falls off.  Underlying nailbed growing in.  It is healthy.    Musculoskeletal:    Lower extremity  muscle strength is normal.  Patient is ambulatory without an assistive device or brace.  No gross deformities.  MS 5/5 all compartments.  Normal arch with weightbearing.     1 Result Note       1 Patient Communication       1 HM Topic               Component  Ref Range & Units 1 mo ago  (9/6/23) 4 mo ago  (6/6/23) 6 mo ago  (5/3/23) 1 yr ago  (3/8/22) 2 yr ago  (5/25/21) 3 yr ago  (7/8/20) 3 yr ago  (1/17/20)    Sodium  136 - 145 mmol/L 141 139 141 141 R 141 R 137 R 141 R    Potassium  3.4 - 5.3 mmol/L 4.4 3.8 3.6        Chloride  98 - 107 mmol/L 104 101 103        Carbon Dioxide (CO2)  22 - 29 mmol/L 26 24 24        Anion Gap  7 - 15 mmol/L 11 14 14 9 R 3 R 5 R 5 R    Urea Nitrogen  8.0 - 23.0 mg/dL 16.3 21.6 20.3 High  R        Creatinine  0.67 - 1.17 mg/dL 1.34 High  1.22 High  1.21 High  1.11 R 1.20 R 1.16 R 1.13 R    Calcium  8.6 - 10.0 mg/dL 9.6 10.4 High  9.9 9.4 R 9.1 R 9.0 R 9.1 R    Glucose  70 - 99 mg/dL 108 High  104 High  107 High         Alkaline Phosphatase  40 - 129 U/L 91 87 86 108 R       AST  0 - 45 U/L 29 36 R 51 High  R 26 26 23    Comment: Reference intervals for this test were updated on 6/12/2023 to more accurately reflect our healthy population. There may be differences in the flagging of prior results with similar values performed with this method. Interpretation of those prior results can be made in the context of the updated reference intervals.    ALT  0 - 70 U/L 61 67 High  R 107 High  R 80 High  66 57    Comment: Reference intervals for this test were updated on 6/12/2023 to more accurately reflect our healthy population. There may be differences in the flagging of prior results with similar values performed with this method. Interpretation of those prior results can be made in the context of the updated reference intervals.      Protein Total  6.4 - 8.3 g/dL 6.9 8.0 7.1 8.0 R 7.1 R 7.9 R     Albumin  3.5 - 5.2 g/dL 4.5 4.8 4.6        Bilirubin Total  <=1.2 mg/dL 0.5 0.6 0.6 0.9 R 0.7  R 1.1 R     GFR Estimate  >60 mL/min/1.73m2 61 68 CM 69 CM 77 CM 67 R, CM 70 R, CM 72 R, CM   Resulting Agency [facility]              A/P  Onychomycosis          Nail avulsion from repetitive trauma    Discussed with patient left second nail has new nail underlying this growing in.  Discussed how long nails can cause problems with avulsion and deformity.  We will keep all lesser nails trimmed as short as possible.  Trim back left hallux nail.  Discussed all options with patient regarding treating fungus.  Discussed right hallux nail growing and looks normal.  Not candidate for oral antifungals with history of elevated LFTs.  Discussed topical antifungals.  Hopefully right hallux nail will continue to grow normal.  We will see if this is effective on left hallux nail.  We did debride back this thick nail.  Instructed patient on how to use Penlac.  We gave him several refills.  If left hallux nail still bothersome we discussed avulsion.  Discussed recovery and efficacy.  Sensitivity distal possible.  RTC as needed.  Thank you for allowing me participate in the care of this patient.        Dejan Blunt DPM, FACFAS                    Again, thank you for allowing me to participate in the care of your patient.        Sincerely,        Dejan Blunt DPM

## 2023-11-01 NOTE — PROGRESS NOTES
Patient seen today in consult from Dr. Koch and complains of thick right and left first toenails(s) .  Has had this for many years.  It is slowly getting worse.  Has had pain in the past which is aggravated by activity and relieved by rest.  Has lost both hallux nails in the past.  Recently lost the right nail.  He has a new 1 growing in.  Denies pain erythema edema or drainage from the right hallux nail.  Tried over the counter medications without success.  Does not like the look of the nail and is wondering about options.  Patient has history of elevated LFTs.  He has history of psoriasis.  He has never smoked.  Family history of cancer.    ROS:  A 10-point review of systems was performed and is positive for that noted in the HPI and as seen below.  All other areas are negative.          Allergies   Allergen Reactions    No Known Drug Allergy        Current Outpatient Medications   Medication Sig Dispense Refill    aspirin (ASA) 81 MG chewable tablet Take 81 mg by mouth daily      atorvastatin (LIPITOR) 40 MG tablet Take 1 tablet (40 mg) by mouth daily 90 tablet 3    buPROPion (WELLBUTRIN XL) 300 MG 24 hr tablet TAKE 1 TABLET EVERY MORNING 90 tablet 1    Cholecalciferol (VITAMIN D) 2000 UNITS CAPS Take by mouth five times a week       fluocinonide (LIDEX) 0.05 % external solution Apply topically 2 times daily 60 mL 1    hydrochlorothiazide (HYDRODIURIL) 25 MG tablet TAKE 1 TABLET DAILY FOR HIGH BLOOD PRESSURE 90 tablet 2    ketoconazole (NIZORAL) 2 % external cream Apply topically daily To the affected areas on the face and ears. 60 g 3    ketoconazole (NIZORAL) 2 % external shampoo Use daily to wash the scalp. Let sit on wet scalp x 3-5 min 120 mL 11    modafinil (PROVIGIL) 200 MG tablet Take 1 tablet (200 mg) by mouth daily 90 tablet 3    Semaglutide-Weight Management (WEGOVY) 0.25 MG/0.5ML pen Inject 0.25 mg Subcutaneous once a week Increase dose to 0.5 mg once a week after 4 weeks if tolerating 2 mL  0    Semaglutide-Weight Management (WEGOVY) 0.5 MG/0.5ML pen Inject 0.5 mg Subcutaneous once a week 2 mL 0    sildenafil (VIAGRA) 50 MG tablet Take 1 tablet (50 mg) by mouth daily as needed 6 tablet 11    tamsulosin (FLOMAX) 0.4 MG capsule Take 1 capsule (0.4 mg) by mouth daily 90 capsule 3       Patient Active Problem List   Diagnosis    CARDIOVASCULAR SCREENING; LDL GOAL LESS THAN 160    Colon polyp    GERD (gastroesophageal reflux disease)    Moderate episode of recurrent major depressive disorder (H)    Hyperlipidemia LDL goal <130    VANESSA on CPAP    Psoriasis    Needle phobia    Hypertension goal BP (blood pressure) < 140/90    Atypical chest pain    Status post coronary angiogram    Abnormal stress test    Fatigue, unspecified type    Class 1 obesity with serious comorbidity and body mass index (BMI) of 33.0 to 33.9 in adult, unspecified obesity type       Past Medical History:   Diagnosis Date    Colon polyp 2010    Repeat colonoscopy 2015    Depressive disorder Long Term    FH: colon cancer     Hyperlipidemia LDL goal <160     Hypertension     Moderate major depression (H) 6/12/2013    Needle phobia     VANESSA on CPAP     could not tolerate CPAP       Past Surgical History:   Procedure Laterality Date    COLONOSCOPY  2020    CV HEART CATHETERIZATION WITH POSSIBLE INTERVENTION N/A 1/17/2020    Procedure: Coronary Angiogram;  Surgeon: Patricia Gomez MD;  Location:  HEART CARDIAC CATH LAB       Family History   Problem Relation Age of Onset    Cancer - colorectal Maternal Grandfather 70    Gastrointestinal Disease Mother         colon polyps    Depression Mother         Chito VELIZ Paternal Grandfather         MI age 65    Prostate Cancer Father 78    Other Cancer Father         Bladder       Social History     Tobacco Use    Smoking status: Never    Smokeless tobacco: Never   Substance Use Topics    Alcohol use: Yes     Alcohol/week: 0.0 - 1.0 standard drinks of alcohol     Comment: 2 drinks per  week         BP (!) 146/80   Pulse 80 .      VConstitutional/ general:  Pt is in no apparent distress, appears well-nourished.  Cooperative with history and physical exam.     Psych:  The patient answered questions appropriately.  Normal affect.  Seems to have reasonable expectations, in terms of treatment.    Eyes:  Visual scanning/ tracking without deficit.    Ears:  Response to auditory stimuli is normal.  negative hearing aid devices.  Auricles in proper alignment.     Lymphatic:  Popliteal lymph nodes not enlarged.     Lungs:  Non labored breathing, non labored speech. No cough.  No audible wheezing. Even, quiet breathing.       Vascular:  Pedal pulses are palpable bilaterally for both the DP and PT arteries.  CFT < 3 sec. ankle edema noted.  Pedal hair growth noted.     Neuro:  Alert and oriented x 3. Coordinated gait.  Light touch sensation is intact to the L4, L5, S1 distributions. No obvious deficits.  No evidence of neurological-based weakness, spasticity, or contracture in the lower extremities.    Derm: Normal texture and turgor.  No erythema, ecchymosis, or cyanosis.  No open lesions. left first toenails(s)  thickened, elongated, discolored, with subungual debris.  The distal half is not attached.  This nail is quite long.  No erythema, edema, drainage, pain on palpation.  No signs of subungual masses or exostosis.  The right hallux nail is absent new nail growing in approximately a third of the way.  New nail growing and appears normal.  All lesser nails are elongated.  Left second nail is loose and easily falls off.  Underlying nailbed growing in.  It is healthy.    Musculoskeletal:    Lower extremity muscle strength is normal.  Patient is ambulatory without an assistive device or brace.  No gross deformities.  MS 5/5 all compartments.  Normal arch with weightbearing.     1 Result Note       1 Patient Communication       1  Topic               Component  Ref Range & Units 1 mo ago  (9/6/23) 4 mo  ago  (6/6/23) 6 mo ago  (5/3/23) 1 yr ago  (3/8/22) 2 yr ago  (5/25/21) 3 yr ago  (7/8/20) 3 yr ago  (1/17/20)    Sodium  136 - 145 mmol/L 141 139 141 141 R 141 R 137 R 141 R    Potassium  3.4 - 5.3 mmol/L 4.4 3.8 3.6        Chloride  98 - 107 mmol/L 104 101 103        Carbon Dioxide (CO2)  22 - 29 mmol/L 26 24 24        Anion Gap  7 - 15 mmol/L 11 14 14 9 R 3 R 5 R 5 R    Urea Nitrogen  8.0 - 23.0 mg/dL 16.3 21.6 20.3 High  R        Creatinine  0.67 - 1.17 mg/dL 1.34 High  1.22 High  1.21 High  1.11 R 1.20 R 1.16 R 1.13 R    Calcium  8.6 - 10.0 mg/dL 9.6 10.4 High  9.9 9.4 R 9.1 R 9.0 R 9.1 R    Glucose  70 - 99 mg/dL 108 High  104 High  107 High         Alkaline Phosphatase  40 - 129 U/L 91 87 86 108 R       AST  0 - 45 U/L 29 36 R 51 High  R 26 26 23    Comment: Reference intervals for this test were updated on 6/12/2023 to more accurately reflect our healthy population. There may be differences in the flagging of prior results with similar values performed with this method. Interpretation of those prior results can be made in the context of the updated reference intervals.    ALT  0 - 70 U/L 61 67 High  R 107 High  R 80 High  66 57    Comment: Reference intervals for this test were updated on 6/12/2023 to more accurately reflect our healthy population. There may be differences in the flagging of prior results with similar values performed with this method. Interpretation of those prior results can be made in the context of the updated reference intervals.      Protein Total  6.4 - 8.3 g/dL 6.9 8.0 7.1 8.0 R 7.1 R 7.9 R     Albumin  3.5 - 5.2 g/dL 4.5 4.8 4.6        Bilirubin Total  <=1.2 mg/dL 0.5 0.6 0.6 0.9 R 0.7 R 1.1 R     GFR Estimate  >60 mL/min/1.73m2 61 68 CM 69 CM 77 CM 67 R, CM 70 R, CM 72 R, CM   Resulting Agency [facility]              A/P  Onychomycosis          Nail avulsion from repetitive trauma    Discussed with patient left  second nail has new nail underlying this growing in.  Discussed how long nails can cause problems with avulsion and deformity.  We will keep all lesser nails trimmed as short as possible.  Trim back left hallux nail.  Discussed all options with patient regarding treating fungus.  Discussed right hallux nail growing and looks normal.  Not candidate for oral antifungals with history of elevated LFTs.  Discussed topical antifungals.  Hopefully right hallux nail will continue to grow normal.  We will see if this is effective on left hallux nail.  We did debride back this thick nail.  Instructed patient on how to use Penlac.  We gave him several refills.  If left hallux nail still bothersome we discussed avulsion.  Discussed recovery and efficacy.  Sensitivity distal possible.  RTC as needed.  Thank you for allowing me participate in the care of this patient.        Dejan Blunt, JESS, FACFAS

## 2023-11-07 ENCOUNTER — TELEPHONE (OUTPATIENT)
Dept: FAMILY MEDICINE | Facility: CLINIC | Age: 58
End: 2023-11-07
Payer: COMMERCIAL

## 2023-11-07 NOTE — TELEPHONE ENCOUNTER
Patient Quality Outreach    Patient is due for the following:   Hypertension -  BP check      Topic Date Due    Hepatitis B Vaccine (1 of 3 - 3-dose series) Never done    COVID-19 Vaccine (4 - 2023-24 season) 09/01/2023       Next Steps:   Schedule a nurse only visit for BP check and immunizations    Type of outreach:    Sent [employer] message. and Sent letter.      Questions for provider review:               Rosa Davila MA

## 2023-11-07 NOTE — LETTER
November 7, 2023      Syed Terrell  [address]  [place] [facility] [address]      Dear Syed,    At [facility] we care about your health and are committed to providing quality patient care.     Here is a list of Health Maintenance topics that are due now or due soon:  Health Maintenance Due   Topic Date Due    HEPATITIS B IMMUNIZATION (1 of 3 - 3-dose series) Never done    COVID-19 Vaccine (4 - 2023-24 season) 09/01/2023    ANNUAL REVIEW OF HM ORDERS  09/30/2023    PHQ-9  10/17/2023        We are recommending that you:  Hyptertension: If you have a home blood pressure monitor, please respond to this message with your latest home blood pressure reading. If you do not, please schedule a free blood pressure check with our clinic.     and   Schedule a Nurse-Only appointment to update your immunizations: Your records indicate that you are not up to date with your immunizations, please schedule a nurse-only appointment to get these updated or update them at your next office visit. If this is incorrect, please disregard.    To schedule an appointment or discuss this further, you may contact us by phone at the [facility] at [phone] or online through the [email or web address] @ [email or web address]    Thank you for trusting [facility] and we appreciate the opportunity to serve you.  We look forward to supporting your healthcare needs in the future.    Your partners in health,      Quality Committee at [facility]

## 2024-01-03 DIAGNOSIS — F33.1 MAJOR DEPRESSIVE DISORDER, RECURRENT EPISODE, MODERATE (H): ICD-10-CM

## 2024-01-04 RX ORDER — BUPROPION HYDROCHLORIDE 300 MG/1
TABLET ORAL
Qty: 90 TABLET | Refills: 1 | Status: SHIPPED | OUTPATIENT
Start: 2024-01-04 | End: 2024-07-02

## 2024-01-19 ASSESSMENT — SLEEP AND FATIGUE QUESTIONNAIRES
HOW LIKELY ARE YOU TO NOD OFF OR FALL ASLEEP IN A CAR, WHILE STOPPED FOR A FEW MINUTES IN TRAFFIC: WOULD NEVER DOZE
HOW LIKELY ARE YOU TO NOD OFF OR FALL ASLEEP WHILE SITTING INACTIVE IN A PUBLIC PLACE: WOULD NEVER DOZE
HOW LIKELY ARE YOU TO NOD OFF OR FALL ASLEEP WHILE LYING DOWN TO REST IN THE AFTERNOON WHEN CIRCUMSTANCES PERMIT: SLIGHT CHANCE OF DOZING
HOW LIKELY ARE YOU TO NOD OFF OR FALL ASLEEP WHILE SITTING AND TALKING TO SOMEONE: WOULD NEVER DOZE
HOW LIKELY ARE YOU TO NOD OFF OR FALL ASLEEP WHILE SITTING QUIETLY AFTER LUNCH WITHOUT ALCOHOL: WOULD NEVER DOZE
HOW LIKELY ARE YOU TO NOD OFF OR FALL ASLEEP WHILE WATCHING TV: SLIGHT CHANCE OF DOZING
HOW LIKELY ARE YOU TO NOD OFF OR FALL ASLEEP WHEN YOU ARE A PASSENGER IN A CAR FOR AN HOUR WITHOUT A BREAK: WOULD NEVER DOZE
HOW LIKELY ARE YOU TO NOD OFF OR FALL ASLEEP WHILE SITTING AND READING: SLIGHT CHANCE OF DOZING

## 2024-01-23 DIAGNOSIS — N52.9 ERECTILE DYSFUNCTION, UNSPECIFIED ERECTILE DYSFUNCTION TYPE: ICD-10-CM

## 2024-01-23 DIAGNOSIS — G47.33 OSA ON CPAP: ICD-10-CM

## 2024-01-23 DIAGNOSIS — R53.83 FATIGUE, UNSPECIFIED TYPE: ICD-10-CM

## 2024-01-23 RX ORDER — SILDENAFIL 50 MG/1
50 TABLET, FILM COATED ORAL DAILY PRN
Qty: 6 TABLET | Refills: 11 | Status: SHIPPED | OUTPATIENT
Start: 2024-01-23

## 2024-01-23 RX ORDER — MODAFINIL 200 MG/1
200 TABLET ORAL DAILY
Qty: 90 TABLET | Refills: 1 | Status: SHIPPED | OUTPATIENT
Start: 2024-01-23 | End: 2024-08-08

## 2024-01-26 ENCOUNTER — OFFICE VISIT (OUTPATIENT)
Dept: SLEEP MEDICINE | Facility: CLINIC | Age: 59
End: 2024-01-26
Payer: COMMERCIAL

## 2024-01-26 VITALS
HEART RATE: 51 BPM | DIASTOLIC BLOOD PRESSURE: 84 MMHG | WEIGHT: 261 LBS | RESPIRATION RATE: 12 BRPM | OXYGEN SATURATION: 97 % | SYSTOLIC BLOOD PRESSURE: 137 MMHG | HEIGHT: 74 IN | BODY MASS INDEX: 33.5 KG/M2

## 2024-01-26 DIAGNOSIS — G47.33 OSA ON CPAP: Primary | ICD-10-CM

## 2024-01-26 PROBLEM — E66.811 CLASS 1 OBESITY WITH SERIOUS COMORBIDITY AND BODY MASS INDEX (BMI) OF 33.0 TO 33.9 IN ADULT, UNSPECIFIED OBESITY TYPE: Chronic | Status: ACTIVE | Noted: 2022-09-30

## 2024-01-26 PROCEDURE — 99204 OFFICE O/P NEW MOD 45 MIN: CPT | Performed by: PHYSICIAN ASSISTANT

## 2024-01-26 NOTE — PATIENT INSTRUCTIONS
Your Body mass index is 33.14 kg/m .  Weight management is a personal decision.  If you are interested in exploring weight loss strategies, the following discussion covers the approaches that may be successful. Body mass index (BMI) is one way to tell whether you are at a healthy weight, overweight, or obese. It measures your weight in relation to your height.  A BMI of 18.5 to 24.9 is in the healthy range. A person with a BMI of 25 to 29.9 is considered overweight, and someone with a BMI of 30 or greater is considered obese. More than two-thirds of American adults are considered overweight or obese.  Being overweight or obese increases the risk for further weight gain. Excess weight may lead to heart disease and diabetes.  Creating and following plans for healthy eating and physical activity may help you improve your health.  Weight control is part of healthy lifestyle and includes exercise, emotional health, and healthy eating habits. Careful eating habits lifelong are the mainstay of weight control. Though there are significant health benefits from weight loss, long-term weight loss with diet alone may be very difficult to achieve- studies show long-term success with dietary management in less than 10% of people. Attaining a healthy weight may be especially difficult to achieve in those with severe obesity. In some cases, medications, devices and surgical management might be considered.  What can you do?  If you are overweight or obese and are interested in methods for weight loss, you should discuss this with your provider.   Consider reducing daily calorie intake by 500 calories.   Keep a food journal.   Avoiding skipping meals, consider cutting portions instead.    Diet combined with exercise helps maintain muscle while optimizing fat loss. Strength training is particularly important for building and maintaining muscle mass. Exercise helps reduce stress, increase energy, and improves fitness. Increasing  exercise without diet control, however, may not burn enough calories to loose weight.     Start walking three days a week 10-20 minutes at a time  Work towards walking thirty minutes five days a week   Eventually, increase the speed of your walking for 1-2 minutes at time    In addition, we recommend that you review healthy lifestyles and methods for weight loss available through the National Institutes of Health patient information sites:  http://win.niddk.nih.gov/publications/index.htm    And look into health and wellness programs that may be available through your health insurance provider, employer, local community center, or laly club.

## 2024-01-26 NOTE — NURSING NOTE
Writer changed pressure settings on cpap while patient was in clinic.     2 year appointment reminder message was created and will be sent out 2 - 3 months prior to next appointment due date. Via [employer]

## 2024-01-26 NOTE — NURSING NOTE
"Chief Complaint   Patient presents with    CPAP Follow Up       Initial BP (!) 144/87   Pulse 51   Resp 12   Ht 1.89 m (6' 2.41\")   Wt 118.4 kg (261 lb)   SpO2 97%   BMI 33.14 kg/m   Estimated body mass index is 33.14 kg/m  as calculated from the following:    Height as of this encounter: 1.89 m (6' 2.41\").    Weight as of this encounter: 118.4 kg (261 lb).    Medication Reconciliation: complete    Neck circumference: 19 inches / 49 centimeters.    DME: Print out.         "

## 2024-01-26 NOTE — PROGRESS NOTES
Outpatient Sleep Medicine Consultation:      Name: Syed Terrell MRN# [id number]   Age: 58 year old YOB: 1965     Date of Consultation: January 26, 2024  Consultation is requested by: Jaqui Koch MD  [address] [place] [address] Jaqui Clark*  Primary care provider: Jaqui Koch       Reason for Sleep Consult:     Syed Terrell is sent by Jaqui Koch for a sleep consultation regarding sleep apnea.    Patient s Reason for visit  Syed Terrell main reason for visit: New Sleep Machine, is it set correct.  Review my sleep.  Patient states problem(s) started: Machine - about October  Syed Terrell's goals for this visit: Machine set correctly, review to see if I can change anything for improvement of sleep quality.           Assessment and Plan:     Summary Sleep Diagnoses & Recommendations:   Mild obstructive sleep apnea-  Excellent CPAP compliance and AHI is well controlled on CPAP 4-20 cm/H20. Daytime symptoms are improved, but not completely remedied.  Will change to auto-CPAP 7-14 cm/H20  Comprehensive DME order placed.    Hypersomnia on CPAP-  Provigil 200 mg daily by PCP  Continue current treatment.    Obesity-  Insurance does not cover Semaglutide.   Patient to counseled to go back to PCP to consider other weight loss agents or referral to the weight management group.  We discussed the link between obesity, sleep apnea, and health outcomes.  Patient was encouraged to decrease caloric intake and increase activity levels to try to move towards a normal weight.  He was encouraged to discuss further strategies with his primary care provider.       Summary Recommendations:  Orders Placed This Encounter   Procedures    Comprehensive DME     Summary Counseling:    Sleep Testing Reviewed  Obstructive Sleep Apnea Reviewed  Complications of Untreated Sleep Apnea Reviewed      Medical Decision-making:   Educational materials provided in  instructions    Total time spent reviewing medical records, history and physical examination, review of previous testing and interpretation as well as documentation on this date:45 minutes    CC: Jaqui Koch          History of Present Illness:       Syed Terrell is a 58 year old male with medical history is significant for HTN, GERD, obstructive sleep apnea  and depression. He presents to [facility]. His last sleep medicine follow up was with Dr. Long on 7/6/2020.    He obtained a new machine last Fall through recall from [employer].     Syed Terrell initially presented with non-restorative sleep and daytime fatigue for more than 10 years.      He had previous sleep evaluation in 2010 through [facility]. PSG on 8/12/2010 showed an AHI of 5.3 and RDI of 19. PLM index was 10.9. REM latency was 75.5 minutes and TST was 384.5 mins. Weight at the time of testing was 210 lbs.      He was prescribed CPAP therapy and is currently on auto CPAP 7-12 cm/H20    Weight now 261 lbs.     Do you use a CPAP Machine at home:  yes  Overall, on a scale of 0-10 how would you rate your CPAP (0 poor, 10 great):  6    What type of mask do you use:  nasal pillows  Is your mask comfortable:  yes  If not, why:      Is your mask leaking:  no  If yes, where do you feel it:    How many night per week does the mask leak (0-7):      Do you notice snoring with mask on:  no  Do you notice gasping arousals with mask on:  no  Are you having significant oral or nasal dryness:  no  Is the pressure setting comfortable:    If not, why:      Respironics  Auto-PAP 4 - 20 cmH2O 30 day usage data:  Setting by [employer]   100% of days with > 4 hours of use. 0/30 days with no use.   Average use 8 hour and 25 minutes per day.   Average large leak 0 LPM.      CPAP 90% pressure 10.2 cm.   AHI 1.9 events per hour.    He has been on Modafinil since 2020 via his PCP for residual hypersomnia on CPAP.     SLEEP-WAKE SCHEDULE:      Work/School Days: Patient goes to school/work: Yes   Usually gets into bed at 8:30  Takes patient about 15 minutes to fall asleep  Has trouble falling asleep 0 nights per week  Wakes up in the middle of the night 1 times.  Wakes up due to Use the bathroom  He has trouble falling back asleep 0 times a week.   It usually takes 5 to get back to sleep  Patient is usually up at 5:45  Uses alarm: Yes    Weekends/Non-work Days/All Other Days:  Usually gets into bed at 8:30   Takes patient about 15 minutes to fall asleep  Patient is usually up at 6:00  Uses alarm: No    Sleep Need  Patient gets  8:00 sleep on average   Patient thinks he needs about not sure sleep    Syedezra Terrell prefers to sleep in this position(s): Side   Patient states they do the following activities in bed: Watch TV    Naps  Patient takes a purposeful nap 0 times a week and naps are usually 0 in duration  He feels better after a nap: Yes  He dozes off unintentionally 0 days per week  Patient has had a driving accident or near-miss due to sleepiness/drowsiness: No      SLEEP DISRUPTIONS:    Breathing/Snoring  Patient snores:Yes  Other people complain about his snoring: Yes  Patient has been told he stops breathing in his sleep:Yes  He has issues with the following:      Movement:  Patient gets pain, discomfort, with an urge to move:  No  It happens when he is resting:  No  It happens more at night:  No  Patient has been told he kicks his legs at night:  No     Behaviors in Sleep:  Syed Terrell has experienced the following behaviors while sleeping:    He has experienced sudden muscle weakness during the day: No      Is there anything else you would like your sleep provider to know: I seem to get more tired than my peers.      CAFFEINE AND OTHER SUBSTANCES:    Patient consumes caffeinated beverages per day:  3  Last caffeine use is usually: 2:00 pm  List of any prescribed or over the counter stimulants that patient takes: Modanafil  List of any  prescribed or over the counter sleep medication patient takes: none  List of previous sleep medications that patient has tried: none  Patient drinks alcohol to help them sleep: No  Patient drinks alcohol near bedtime: No    Family History:  Patient has a family member been diagnosed with a sleep disorder: Yes  sleep apnea         SCALES:    EPWORTH SLEEPINESS SCALE         1/19/2024     9:46 AM    Pointblank Sleepiness Scale ( JAY Ta  [id number]<br>ESS - [place]/English - Final version - 21 Nov 07 - [facility] [facility] [place].)   Sitting and reading Slight chance of dozing   Watching TV Slight chance of dozing   Sitting, inactive in a public place (e.g. a theatre or a meeting) Would never doze   As a passenger in a car for an hour without a break Would never doze   Lying down to rest in the afternoon when circumstances permit Slight chance of dozing   Sitting and talking to someone Would never doze   Sitting quietly after a lunch without alcohol Would never doze   In a car, while stopped for a few minutes in traffic Would never doze   Pointblank Score (MC) 3   Pointblank Score (Sleep) 3         INSOMNIA SEVERITY INDEX (LAUREN)          1/19/2024     9:36 AM   Insomnia Severity Index (LAUREN)   Difficulty falling asleep 1   Difficulty staying asleep 1   Problems waking up too early 1   How SATISFIED/DISSATISFIED are you with your CURRENT sleep pattern? 2   How NOTICEABLE to others do you think your sleep problem is in terms of impairing the quality of your life? 1   How WORRIED/DISTRESSED are you about your current sleep problem? 2   To what extent do you consider your sleep problem to INTERFERE with your daily functioning (e.g. daytime fatigue, mood, ability to function at work/daily chores, concentration, memory, mood, etc.) CURRENTLY? 1   LAUREN Total Score 9       Guidelines for Scoring/Interpretation:  Total score categories:  0-7 = No clinically significant insomnia   8-14 = Subthreshold insomnia   15-21 = Clinical insomnia  "(moderate severity)  22-28 = Clinical insomnia (severe)  Used via courtesy of www.myhealth.va.gov with permission from Travis Swanson PhD., [facility]      STOP BANG         1/26/2024    10:55 AM   STOP BANG Questionnaire (  2008, the American Society of Anesthesiologists, Inc. [employer]   B/P Clinic: 137/84         GAD7         No data to display                  CAGE-AID         No data to display                CAGE-AID reprinted with permission from the [place] Medical Journal, DORCAS Cole. and WALESKA Brian, \"Conjoint screening questionnaires for alcohol and drug abuse\" [place] Medical Journal 94: 135-140, 1995.      PATIENT HEALTH QUESTIONNAIRE-9 (PHQ - 9)        4/17/2023     1:43 PM   PHQ-9 ([employer])   1.  Little interest or pleasure in doing things 0   2.  Feeling down, depressed, or hopeless 0   3.  Trouble falling or staying asleep, or sleeping too much 0   4.  Feeling tired or having little energy 1   5.  Poor appetite or overeating 2   6.  Feeling bad about yourself - or that you are a failure or have let yourself or your family down 0   7.  Trouble concentrating on things, such as reading the newspaper or watching television 0   8.  Moving or speaking so slowly that other people could have noticed. Or the opposite - being so fidgety or restless that you have been moving around a lot more than usual 0   9.  Thoughts that you would be better off dead, or of hurting yourself in some way 0   PHQ-9 Total Score 3   6.  Feeling bad about yourself 0   7.  Trouble concentrating 0   8.  Moving slowly or restless 0   9.  Suicidal or self-harm thoughts 0   1.  Little interest or pleasure in doing things Not at all   2.  Feeling down, depressed, or hopeless Not at all   3.  Trouble falling or staying asleep, or sleeping too much Not at all   4.  Feeling tired or having little energy Several days   5.  Poor appetite or overeating More than half the days   6.  Feeling bad about " yourself Not at all   7.  Trouble concentrating Not at all   8.  Moving slowly or restless Not at all   9.  Suicidal or self-harm thoughts Not at all   PHQ-9 via [facility] TOTAL SCORE-----> 3 (Minimal depression)   Difficulty at work, home, or with people Not difficult at all       Developed by Erlin Solano, Joann Teixeira, Jesus Anne and colleagues, with an educational gavino from [employer] No permission required to reproduce, translate, display or distribute.        Allergies:    Allergies   Allergen Reactions    No Known Drug Allergy        Medications:    Current Outpatient Medications   Medication Sig Dispense Refill    aspirin (ASA) 81 MG chewable tablet Take 81 mg by mouth daily      atorvastatin (LIPITOR) 40 MG tablet Take 1 tablet (40 mg) by mouth daily 90 tablet 3    buPROPion (WELLBUTRIN XL) 300 MG 24 hr tablet TAKE 1 TABLET EVERY MORNING 90 tablet 1    Cholecalciferol (VITAMIN D) 2000 UNITS CAPS Take by mouth five times a week       hydrochlorothiazide (HYDRODIURIL) 25 MG tablet TAKE 1 TABLET DAILY FOR HIGH BLOOD PRESSURE 90 tablet 2    modafinil (PROVIGIL) 200 MG tablet Take 1 tablet (200 mg) by mouth daily 90 tablet 1    sildenafil (VIAGRA) 50 MG tablet Take 1 tablet (50 mg) by mouth daily as needed 6 tablet 11       Problem List:  Patient Active Problem List    Diagnosis Date Noted    Fatigue, unspecified type 09/30/2022     Priority: Medium    Class 1 obesity with serious comorbidity and body mass index (BMI) of 33.0 to 33.9 in adult, unspecified obesity type 09/30/2022     Priority: Medium    Abnormal stress test 01/24/2020     Priority: Medium    Status post coronary angiogram 01/17/2020     Priority: Medium    Atypical chest pain 01/10/2020     Priority: Medium     Added automatically from request for surgery [id number]      Hypertension goal BP (blood pressure) < 140/90 12/27/2019     Priority: Medium    Needle phobia      Priority: Medium    Psoriasis 09/24/2015     Priority: Medium     Hyperlipidemia LDL goal <130      Priority: Medium    VANESSA on CPAP      Priority: Medium     PSG on 8/12/2010 showed an AHI of 5.3 and RDI of 19. PLM index was 10.9. REM latency was 75.5 minutes and TST was 384.5 mins.       Moderate episode of recurrent major depressive disorder (H) 06/12/2013     Priority: Medium    GERD (gastroesophageal reflux disease) 07/05/2012     Priority: Medium    CARDIOVASCULAR SCREENING; LDL GOAL LESS THAN 160 06/27/2012     Priority: Medium    Colon polyp      Priority: Medium     Repeat colonoscopy 2015          Past Medical/Surgical History:  Past Medical History:   Diagnosis Date    Colon polyp 2010    Repeat colonoscopy 2015    Depressive disorder Long Term    FH: colon cancer     Hyperlipidemia LDL goal <160     Hypertension     Moderate major depression (H) 6/12/2013    Needle phobia     VANESSA on CPAP     could not tolerate CPAP     Past Surgical History:   Procedure Laterality Date    COLONOSCOPY  2020    CV HEART CATHETERIZATION WITH POSSIBLE INTERVENTION N/A 1/17/2020    Procedure: Coronary Angiogram;  Surgeon: Patricia Gomez MD;  Location:  HEART CARDIAC CATH LAB       Social History:  Social History     Socioeconomic History    Marital status:      Spouse name: Not on file    Number of children: Not on file    Years of education: Not on file    Highest education level: Not on file   Occupational History    Occupation: Accounting   Tobacco Use    Smoking status: Never    Smokeless tobacco: Never   Vaping Use    Vaping Use: Never used   Substance and Sexual Activity    Alcohol use: Yes     Alcohol/week: 0.0 - 1.0 standard drinks of alcohol     Comment: 2 drinks per week    Drug use: No    Sexual activity: Yes     Partners: Female   Other Topics Concern    Parent/sibling w/ CABG, MI or angioplasty before 65F 55M? No   Social History Narrative    , 2 sons ages 21 and 17     Social Determinants of Health     Financial Resource Strain: Not on file   Food  "Insecurity: Not on file   Transportation Needs: Not on file   Physical Activity: Not on file   Stress: Not on file   Social Connections: Not on file   Interpersonal Safety: Not on file   Housing Stability: Not on file       Family History:  Family History   Problem Relation Age of Onset    Cancer - colorectal Maternal Grandfather 70    Gastrointestinal Disease Mother         colon polyps    Depression Mother         [place]    JOSE ALFREDO Paternal Grandfather         MI age 65    Prostate Cancer Father 78    Other Cancer Father         Bladder       Review of Systems:  A complete review of systems reviewed by me is negative with the exeption of what has been mentioned in the history of present illness.  In the last TWO WEEKS have you experienced any of the following symptoms?  Fevers: No  Night Sweats: No  Weight Gain: Yes  Pain at Night: No  Double Vision: No  Changes in Vision: Yes  Difficulty Breathing through Nose: No  Sore Throat in Morning: No  Dry Mouth in the Morning: No  Shortness of Breath Lying Flat: Yes  Shortness of Breath With Activity: Yes  Awakening with Shortness of Breath: No  Increased Cough: No  Heart Racing at Night: Yes  Swelling in Feet or Legs: No  Diarrhea at Night: No  Heartburn at Night: No  Urinating More than Once at Night: No  Losing Control of Urine at Night: No  Joint Pains at Night: No  Headaches in Morning: No  Weakness in Arms or Legs: No  Depressed Mood: No  Anxiety: No     Physical Examination:  Vitals: /84   Pulse 51   Resp 12   Ht 1.89 m (6' 2.41\")   Wt 118.4 kg (261 lb)   SpO2 97%   BMI 33.14 kg/m    BMI= Body mass index is 33.14 kg/m .    Neck Cir (cm): 49 cm         Data: All pertinent previous laboratory data reviewed     Recent Labs   Lab Test 09/06/23  0709 06/06/23  0734    139   POTASSIUM 4.4 3.8   CHLORIDE 104 101   CO2 26 24   ANIONGAP 11 14   * 104*   BUN 16.3 21.6   CR 1.34* 1.22*   ELEUTERIO 9.6 10.4*       Recent Labs   Lab Test 05/03/23  0727   WBC " 6.0   RBC 4.45   HGB 14.7   HCT 40.8   MCV 92   MCH 33.0   MCHC 36.0   RDW 12.2          Recent Labs   Lab Test 09/06/23  0709   PROTTOTAL 6.9   ALBUMIN 4.5   BILITOTAL 0.5   ALKPHOS 91   AST 29   ALT 61       TSH   Date Value   09/06/2023 5.10 uIU/mL (H)   06/06/2023 4.57 uIU/mL (H)   02/05/2019 2.50 mU/L   06/27/2012 2.36 mU/L       Ferritin   Date/Time Value Ref Range Status   07/08/2020 05:06  26 - 388 ng/mL Final     EVANGELINA Burnham 1/26/2024

## 2024-01-27 ENCOUNTER — MYC MEDICAL ADVICE (OUTPATIENT)
Dept: FAMILY MEDICINE | Facility: CLINIC | Age: 59
End: 2024-01-27
Payer: COMMERCIAL

## 2024-02-05 ASSESSMENT — PATIENT HEALTH QUESTIONNAIRE - PHQ9
10. IF YOU CHECKED OFF ANY PROBLEMS, HOW DIFFICULT HAVE THESE PROBLEMS MADE IT FOR YOU TO DO YOUR WORK, TAKE CARE OF THINGS AT HOME, OR GET ALONG WITH OTHER PEOPLE: NOT DIFFICULT AT ALL
SUM OF ALL RESPONSES TO PHQ QUESTIONS 1-9: 4
SUM OF ALL RESPONSES TO PHQ QUESTIONS 1-9: 4

## 2024-02-06 ENCOUNTER — VIRTUAL VISIT (OUTPATIENT)
Dept: FAMILY MEDICINE | Facility: CLINIC | Age: 59
End: 2024-02-06
Payer: COMMERCIAL

## 2024-02-06 DIAGNOSIS — R73.01 ELEVATED FASTING GLUCOSE: ICD-10-CM

## 2024-02-06 DIAGNOSIS — R89.9 ABNORMAL LABORATORY TEST RESULT: ICD-10-CM

## 2024-02-06 DIAGNOSIS — E66.811 CLASS 1 OBESITY WITH SERIOUS COMORBIDITY AND BODY MASS INDEX (BMI) OF 33.0 TO 33.9 IN ADULT, UNSPECIFIED OBESITY TYPE: Primary | Chronic | ICD-10-CM

## 2024-02-06 PROCEDURE — 99214 OFFICE O/P EST MOD 30 MIN: CPT | Mod: 95 | Performed by: FAMILY MEDICINE

## 2024-02-06 RX ORDER — METFORMIN HCL 500 MG
TABLET, EXTENDED RELEASE 24 HR ORAL
Qty: 21 TABLET | Refills: 0 | Status: SHIPPED | OUTPATIENT
Start: 2024-02-06 | End: 2024-02-13

## 2024-02-06 NOTE — PATIENT INSTRUCTIONS
Schedule lab only visit in the next few weeks   Start metformin in titrating dose.   I'll update you later this week with plan for phentermine  Med check in 4 weeks

## 2024-02-06 NOTE — PROGRESS NOTES
"    Instructions Relayed to Patient by Virtual Roomer:     Patient is active on [employer]:   Relayed following to patient: \"It looks like you are active on [employer], are you able to join the visit this way? If not, do you need us to send you a link now or would you like your provider to send a link via text or email when they are ready to initiate the visit?\"    Reminded patient to ensure they were logged on to virtual visit by arrival time listed. Documented in appointment notes if patient had flexibility to initiate visit sooner than arrival time. If pediatric virtual visit, ensured pediatric patient along with parent/guardian will be present for video visit.     Patient offered the website [email or web address] and/or phone number to [employer] Help line: [phone]    Syed is a 58 year old who is being evaluated via a billable video visit.      How would you like to obtain your AVS? [place]ely  If the video visit is dropped, the invitation should be resent by: Text to cell phone: [phone]  Will anyone else be joining your video visit? No      Assessment & Plan     Class 1 obesity with serious comorbidity and body mass index (BMI) of 33.0 to 33.9 in adult, unspecified obesity type  Reviewed medication options for wt loss - glp 1 agonists not covered. He is interested in phentermine and even willing to stay off provigil if needed for this as feels provigil gives only minimal benefit. Will check with pharm D on using these meds together or stopping provigil prior to phentermine start.   Also will use metformin given his prediabetes. Reviewed diet changes also   Reviewed timing of taking, onset, benefits, monitoring and typicall and severe AE of the medications.  Follow up 4 weeks.   - metFORMIN (GLUCOPHAGE XR) 500 MG 24 hr tablet; Take 1 tablet (500 mg) by mouth daily (with dinner) for 7 days, THEN 1 tablet (500 mg) 2 times daily (with meals) for 7 days. Then one tablet in the morning and two tablets " "in the evening with meals for 7 days and finally increase dose to 1,000 mg bid with meals.  - Comprehensive metabolic panel (BMP + Alb, Alk Phos, ALT, AST, Total. Bili, TP); Future  - TSH with free T4 reflex; Future  - Hemoglobin A1c; Future    Elevated fasting glucose  Pre-diabetes. Trial metformin as noted above. Monitor labs  - metFORMIN (GLUCOPHAGE XR) 500 MG 24 hr tablet; Take 1 tablet (500 mg) by mouth daily (with dinner) for 7 days, THEN 1 tablet (500 mg) 2 times daily (with meals) for 7 days. Then one tablet in the morning and two tablets in the evening with meals for 7 days and finally increase dose to 1,000 mg bid with meals.  - Comprehensive metabolic panel (BMP + Alb, Alk Phos, ALT, AST, Total. Bili, TP); Future  - TSH with free T4 reflex; Future  - Hemoglobin A1c; Future    Abnormal laboratory test result  Tsh has been borderline elevated but nl thyroid abys. Due for recheck of levels.   - TSH with free T4 reflex; Future            BMI  Estimated body mass index is 33.14 kg/m  as calculated from the following:    Height as of 1/26/24: 1.89 m (6' 2.41\").    Weight as of 1/26/24: 118.4 kg (261 lb).         Patient Instructions   Schedule lab only visit in the next few weeks   Start metformin in titrating dose.   I'll update you later this week with plan for phentermine  Med check in 4 weeks          Rachel Lynch is a 58 year old, presenting for the following health issues:  Weight Loss and Medication Request (Appetite Suppressant )        2/6/2024     9:11 AM   Additional Questions   Roomed by Logan ALCOCER   Accompanied by Self     History of Present Illness       Reason for visit:  Discuss continued weight gain, possibility of a prescription for an appetite suppressant.    He eats 2-3 servings of fruits and vegetables daily.He consumes 0 sweetened beverage(s) daily.He exercises with enough effort to increase his heart rate 10 to 19 minutes per day.  He exercises with enough effort to increase his heart " "rate 3 or less days per week.   He is taking medications regularly.   Estimated body mass index is 33.14 kg/m  as calculated from the following:    Height as of 1/26/24: 1.89 m (6' 2.41\").    Weight as of 1/26/24: 118.4 kg (261 lb).       Has lost weight over the years by [place]/low carb diet but generally not sustainable and then gains wt back.   Can crave foods/ tends to snack in addition to meals.     Feel weight impacts his breathing/out of shape    Looking to have something that can help him keep weight off   Never done formal wt loss program  Wife is taking Monjauro    Has been off modafinil for the last 3 months.   Didn't notice huge difference. Might impact focus small amt.                           Objective           Vitals:  No vitals were obtained today due to virtual visit.    Physical Exam   GENERAL: alert and no distress  EYES: Eyes grossly normal to inspection.  No discharge or erythema, or obvious scleral/conjunctival abnormalities.  RESP: No audible wheeze, cough, or visible cyanosis.    SKIN: Visible skin clear. No significant rash, abnormal pigmentation or lesions.  NEURO: Cranial nerves grossly intact.  Mentation and speech appropriate for age.  PSYCH: Appropriate affect, tone, and pace of words          Video-Visit Details    Type of service:  Video Visit     Originating Location (pt. Location): Home    Distant Location (provider location):  Off-site  Platform used for Video Visit: Miguel  Signed Electronically by: Jaqui Koch MD    "

## 2024-02-07 ENCOUNTER — LAB (OUTPATIENT)
Dept: LAB | Facility: CLINIC | Age: 59
End: 2024-02-07
Payer: COMMERCIAL

## 2024-02-07 DIAGNOSIS — R89.9 ABNORMAL LABORATORY TEST RESULT: ICD-10-CM

## 2024-02-07 DIAGNOSIS — E66.811 CLASS 1 OBESITY WITH SERIOUS COMORBIDITY AND BODY MASS INDEX (BMI) OF 33.0 TO 33.9 IN ADULT, UNSPECIFIED OBESITY TYPE: Chronic | ICD-10-CM

## 2024-02-07 DIAGNOSIS — R73.01 ELEVATED FASTING GLUCOSE: ICD-10-CM

## 2024-02-07 LAB — HBA1C MFR BLD: 6 % (ref 0–5.6)

## 2024-02-07 PROCEDURE — 83036 HEMOGLOBIN GLYCOSYLATED A1C: CPT

## 2024-02-07 PROCEDURE — 36415 COLL VENOUS BLD VENIPUNCTURE: CPT

## 2024-02-07 PROCEDURE — 84443 ASSAY THYROID STIM HORMONE: CPT

## 2024-02-07 PROCEDURE — 80053 COMPREHEN METABOLIC PANEL: CPT

## 2024-02-08 LAB
ALBUMIN SERPL BCG-MCNC: 4.6 G/DL (ref 3.5–5.2)
ALP SERPL-CCNC: 92 U/L (ref 40–150)
ALT SERPL W P-5'-P-CCNC: 75 U/L (ref 0–70)
ANION GAP SERPL CALCULATED.3IONS-SCNC: 11 MMOL/L (ref 7–15)
AST SERPL W P-5'-P-CCNC: 42 U/L (ref 0–45)
BILIRUB SERPL-MCNC: 0.8 MG/DL
BUN SERPL-MCNC: 15.9 MG/DL (ref 8–23)
CALCIUM SERPL-MCNC: 9.8 MG/DL (ref 8.6–10)
CHLORIDE SERPL-SCNC: 103 MMOL/L (ref 98–107)
CREAT SERPL-MCNC: 1.15 MG/DL (ref 0.67–1.17)
DEPRECATED HCO3 PLAS-SCNC: 27 MMOL/L (ref 22–29)
EGFRCR SERPLBLD CKD-EPI 2021: 73 ML/MIN/1.73M2
GLUCOSE SERPL-MCNC: 104 MG/DL (ref 70–99)
POTASSIUM SERPL-SCNC: 4.4 MMOL/L (ref 3.4–5.3)
PROT SERPL-MCNC: 7.3 G/DL (ref 6.4–8.3)
SODIUM SERPL-SCNC: 141 MMOL/L (ref 135–145)
TSH SERPL DL<=0.005 MIU/L-ACNC: 3.04 UIU/ML (ref 0.3–4.2)

## 2024-02-09 DIAGNOSIS — R79.89 ELEVATED LFTS: Primary | ICD-10-CM

## 2024-02-09 NOTE — RESULT ENCOUNTER NOTE
Syed,  It was a pleasure talking with you recently.  -Kidney function looks good.  Your electrolytes are also normal.  I am not sure if you were fasting for the blood work but if so your glucose was mildly elevated in the prediabetic range.  The A1c test which looks at your average blood glucose over the previous 3 months was also elevated in the prediabetic range.  Definitely, starting on the metformin will help these numbers.  Also, hopefully getting your weight down, improving your diet and increasing exercise will also help minimize your glucose levels.  -One of your liver enzyme tests, ALT, popped up again.  So many different things can cause liver test to go up and given this has recurred I would suggest we dive into a more formal evaluation.  this is usually done with an ultrasound of the liver and more extensive blood tests (will screen things such as hepatitis viruses, autoimmune disease, iron levels,  etc.).      The  will contact you to set up the ultrasound.  I will have to have you come in for another lab visit for the additional blood work.  You can get this scheduled at your convenience in the next month sometime.  You do not need to be fasting.    Please MyChart or call if you have any concerns or questions.   Sincerely,  Jaqui Koch MD

## 2024-02-13 ENCOUNTER — TELEPHONE (OUTPATIENT)
Dept: FAMILY MEDICINE | Facility: CLINIC | Age: 59
End: 2024-02-13
Payer: COMMERCIAL

## 2024-02-13 DIAGNOSIS — R73.01 ELEVATED FASTING GLUCOSE: ICD-10-CM

## 2024-02-13 DIAGNOSIS — E66.811 CLASS 1 OBESITY WITH SERIOUS COMORBIDITY AND BODY MASS INDEX (BMI) OF 33.0 TO 33.9 IN ADULT, UNSPECIFIED OBESITY TYPE: Chronic | ICD-10-CM

## 2024-02-13 RX ORDER — METFORMIN HCL 500 MG
TABLET, EXTENDED RELEASE 24 HR ORAL
Qty: 180 TABLET | Refills: 0 | Status: SHIPPED | OUTPATIENT
Start: 2024-02-13 | End: 2024-05-02

## 2024-02-13 NOTE — TELEPHONE ENCOUNTER
Message from Pharmacy: Please clarify the QUANTITY, sign and return. Most patient plans allow for a 90 day supply of medication.

## 2024-02-23 ENCOUNTER — ANCILLARY PROCEDURE (OUTPATIENT)
Dept: ULTRASOUND IMAGING | Facility: CLINIC | Age: 59
End: 2024-02-23
Attending: FAMILY MEDICINE
Payer: COMMERCIAL

## 2024-02-23 DIAGNOSIS — R79.89 ELEVATED LFTS: ICD-10-CM

## 2024-02-23 PROBLEM — K76.0 FATTY LIVER: Status: ACTIVE | Noted: 2024-02-23

## 2024-02-23 PROCEDURE — 76705 ECHO EXAM OF ABDOMEN: CPT | Performed by: STUDENT IN AN ORGANIZED HEALTH CARE EDUCATION/TRAINING PROGRAM

## 2024-02-23 NOTE — RESULT ENCOUNTER NOTE
Syed,  Thanks for getting the ultrasound completed.  The scan does confirm you have something called fatty liver.   This is a very common disease of the liver where fat deposits in the liver and can irritate it. It usually happens in adults who have obesity or diabetes issues. The treatment for fatty liver disease is weight loss and healthy diet.  Most adults with fatty liver we will not have symptoms or long-term issues.  However, in a small percentage of people, if left untreated, fatty liver disease can progress and cause scarring to the liver which can lead to cirrhosis. Thankfully, this only occurs in a small percentage of patients.      Will plan to monitor liver function tests at least twice per year.  I would still encourage you to get the additional blood work done to look for other causes of irritation to the liver.  We could also consider an additional scan of the liver in the future to look to see if there is been any signs of scarring thus far.  Lets plan to talk through this at your next appointment.    Please MyChart or call if you have any concerns or questions.   Sincerely,  Jaqui Koch MD    PS.  The ultrasound could also see an incidental small cyst on your right kidney.  Cysts are quite common and are not worrisome.  This does not require any further follow-up

## 2024-03-12 ENCOUNTER — VIRTUAL VISIT (OUTPATIENT)
Dept: FAMILY MEDICINE | Facility: CLINIC | Age: 59
End: 2024-03-12
Payer: COMMERCIAL

## 2024-03-12 DIAGNOSIS — K76.0 FATTY LIVER: ICD-10-CM

## 2024-03-12 DIAGNOSIS — R79.89 ELEVATED LFTS: ICD-10-CM

## 2024-03-12 DIAGNOSIS — E66.811 CLASS 1 OBESITY WITH SERIOUS COMORBIDITY AND BODY MASS INDEX (BMI) OF 33.0 TO 33.9 IN ADULT, UNSPECIFIED OBESITY TYPE: Primary | ICD-10-CM

## 2024-03-12 DIAGNOSIS — R73.01 ELEVATED FASTING GLUCOSE: ICD-10-CM

## 2024-03-12 PROCEDURE — 99213 OFFICE O/P EST LOW 20 MIN: CPT | Mod: 95 | Performed by: FAMILY MEDICINE

## 2024-03-12 NOTE — PROGRESS NOTES
"    Instructions Relayed to Patient by Virtual Roomer:     Patient is active on Vapps:   Relayed following to patient: \"It looks like you are active on Vapps, are you able to join the visit this way? If not, do you need us to send you a link now or would you like your provider to send a link via text or email when they are ready to initiate the visit?\"    Reminded patient to ensure they were logged on to virtual visit by arrival time listed. Documented in appointment notes if patient had flexibility to initiate visit sooner than arrival time. If pediatric virtual visit, ensured pediatric patient along with parent/guardian will be present for video visit.     Patient offered the website [email or web address] and/or phone number to Vapps Help line: [phone]    Syed is a 58 year old who is being evaluated via a billable video visit.      How would you like to obtain your AVS? [employer]  If the video visit is dropped, the invitation should be resent by: Text to cell phone: [phone]  Will anyone else be joining your video visit? No      Assessment & Plan     Class 1 obesity with serious comorbidity and body mass index (BMI) of 33.0 to 33.9 in adult, unspecified obesity type  Motivated for wt loss. Never started phentermine but considering the zepbound start through on-line compounding pharmacy. Reviewed med/benefits/AE    Elevated LFTs  Likely due to above but have additional labs to ro other etiology    Elevated fasting glucose  Started metformin last visit, tolerating. Ok to stop this once starting zepbound to minimize polypharmacy    Fatty liver  Reviewed us and dx, fib 4 monitoring and tx.                   Subjective   Syed is a 58 year old, presenting for the following health issues:  Weight Loss (Zepbound)  - Signed up to start taking it from a online website called [employer] - want to make sure there are no conflicts  - mentioned a fatty liver      3/12/2024     6:46 AM   Additional " Questions   Roomed by Logan ALCOCER   Accompanied by Self     History of Present Illness       Reason for visit:  Weight loss - Medication start up      1.28 fib 4 score from labs 5/3/23.    Nancy- has meeting with them (on-line wt loss/compounding pharmacy) at 10 am this am.   Considering zepbound.     Started metformin last visit. Doesn't seem to be helpful.   Never started phentermine          Objective           Vitals:  No vitals were obtained today due to virtual visit.    Physical Exam   GENERAL: alert and no distress  EYES: Eyes grossly normal to inspection.  No discharge or erythema, or obvious scleral/conjunctival abnormalities.  RESP: No audible wheeze, cough, or visible cyanosis.    SKIN: Visible skin clear. No significant rash, abnormal pigmentation or lesions.  NEURO: Cranial nerves grossly intact.  Mentation and speech appropriate for age.  PSYCH: Appropriate affect, tone, and pace of words  Component      Latest Ref Rng 6/6/2023  7:34 AM 9/6/2023  7:09 AM 2/7/2024  10:14 AM   Sodium      135 - 145 mmol/L 139  141  141    Potassium      3.4 - 5.3 mmol/L 3.8  4.4  4.4    Chloride      98 - 107 mmol/L 101  104  103    Carbon Dioxide (CO2)      22 - 29 mmol/L 24  26  27    Anion Gap      7 - 15 mmol/L 14  11  11    Urea Nitrogen      8.0 - 23.0 mg/dL 21.6  16.3  15.9    Creatinine      0.67 - 1.17 mg/dL 1.22 (H)  1.34 (H)  1.15    Calcium      8.6 - 10.0 mg/dL 10.4 (H)  9.6  9.8    Glucose      70 - 99 mg/dL 104 (H)  108 (H)  104 (H)    Alkaline Phosphatase      40 - 150 U/L 87  91  92    AST      0 - 45 U/L 36  29  42    ALT      0 - 70 U/L 67 (H)  61  75 (H)    Protein Total      6.4 - 8.3 g/dL 8.0  6.9  7.3    Albumin      3.5 - 5.2 g/dL 4.8  4.5  4.6    Bilirubin Total      <=1.2 mg/dL 0.6  0.5  0.8    GFR Estimate      >60 mL/min/1.73m2 68  61  73    TSH      0.30 - 4.20 uIU/mL 4.57 (H)  5.10 (H)  3.04    Thyroid Peroxidase Antibody      <35 IU/mL 21      Thyroglobulin Antibody      <40 IU/mL <20       T4 Free      0.90 - 1.70 ng/dL 1.13  1.16     Hemoglobin A1C      0.0 - 5.6 %   6.0 (H)       Legend:  (H) High  Recent Results (from the past 744 hour(s))   US Abdomen Limited    Narrative    EXAMINATION: Limited Abdominal Ultrasound, 2/23/2024 7:03 AM     COMPARISON: None.    History: Elevated LFTs.     FINDINGS:  Pancreas: Visualized portions of the head and body of the pancreas are  unremarkable.     Liver: The liver measures 16.5 cm and demonstrates diffusely echogenic  parenchyma. No evidence of a focal hepatic mass. The main portal vein  is patent with antegrade flow.    Gallbladder: There is no wall thickening, pericholecystic fluid,  positive sonographic Cuenca's sign or evidence for cholelithiasis.    Bile Ducts: Both the intra- and extrahepatic biliary system are of  normal caliber.  The common bile duct measures 5 mm in diameter.    Right Kidney: Measures 12.5 cm in length with normal parenchymal  echogenicity and cortical thickness. No hydronephrosis. Benign 2.2 cm  cyst laterally.    Fluid: None in the visualized abdomen.      Impression    IMPRESSION:   1. Hepatic steatosis.  2. Normal remaining right upper quadrant ultrasound.  Incidental  benign 2.2 cm right renal cyst.    I have personally reviewed the examination and initial interpretation  and I agree with the findings.    JACQUELINE MCCOLLUM MD         SYSTEM ID:  [id number]           Video-Visit Details    Type of service:  Video Visit     Originating Location (pt. Location): Home    Distant Location (provider location):  Off-site  Platform used for Video Visit: Miguel  Signed Electronically by: Jaqui Koch MD

## 2024-04-25 SDOH — HEALTH STABILITY: PHYSICAL HEALTH: ON AVERAGE, HOW MANY DAYS PER WEEK DO YOU ENGAGE IN MODERATE TO STRENUOUS EXERCISE (LIKE A BRISK WALK)?: 4 DAYS

## 2024-04-25 SDOH — HEALTH STABILITY: PHYSICAL HEALTH: ON AVERAGE, HOW MANY MINUTES DO YOU ENGAGE IN EXERCISE AT THIS LEVEL?: 40 MIN

## 2024-04-25 ASSESSMENT — SOCIAL DETERMINANTS OF HEALTH (SDOH): HOW OFTEN DO YOU GET TOGETHER WITH FRIENDS OR RELATIVES?: ONCE A WEEK

## 2024-05-02 ENCOUNTER — OFFICE VISIT (OUTPATIENT)
Dept: FAMILY MEDICINE | Facility: CLINIC | Age: 59
End: 2024-05-02
Attending: FAMILY MEDICINE
Payer: COMMERCIAL

## 2024-05-02 VITALS
SYSTOLIC BLOOD PRESSURE: 112 MMHG | RESPIRATION RATE: 12 BRPM | BODY MASS INDEX: 31.87 KG/M2 | TEMPERATURE: 98 F | WEIGHT: 240.44 LBS | DIASTOLIC BLOOD PRESSURE: 72 MMHG | HEIGHT: 73 IN | OXYGEN SATURATION: 100 % | HEART RATE: 50 BPM

## 2024-05-02 DIAGNOSIS — Z12.5 SCREENING FOR PROSTATE CANCER: ICD-10-CM

## 2024-05-02 DIAGNOSIS — N52.9 ERECTILE DYSFUNCTION, UNSPECIFIED ERECTILE DYSFUNCTION TYPE: ICD-10-CM

## 2024-05-02 DIAGNOSIS — R79.89 ELEVATED LFTS: ICD-10-CM

## 2024-05-02 DIAGNOSIS — R53.83 FATIGUE, UNSPECIFIED TYPE: ICD-10-CM

## 2024-05-02 DIAGNOSIS — G47.33 OSA ON CPAP: ICD-10-CM

## 2024-05-02 DIAGNOSIS — K76.0 FATTY LIVER: ICD-10-CM

## 2024-05-02 DIAGNOSIS — E66.811 CLASS 1 OBESITY WITH SERIOUS COMORBIDITY IN ADULT, UNSPECIFIED BMI, UNSPECIFIED OBESITY TYPE: ICD-10-CM

## 2024-05-02 DIAGNOSIS — R73.01 ELEVATED FASTING GLUCOSE: ICD-10-CM

## 2024-05-02 DIAGNOSIS — F33.1 MAJOR DEPRESSIVE DISORDER, RECURRENT EPISODE, MODERATE (H): ICD-10-CM

## 2024-05-02 DIAGNOSIS — Z00.00 ENCOUNTER FOR MEDICARE ANNUAL WELLNESS EXAM: Primary | ICD-10-CM

## 2024-05-02 DIAGNOSIS — I10 BENIGN HYPERTENSION: ICD-10-CM

## 2024-05-02 LAB
BASOPHILS # BLD AUTO: 0 10E3/UL (ref 0–0.2)
BASOPHILS NFR BLD AUTO: 1 %
EOSINOPHIL # BLD AUTO: 0.2 10E3/UL (ref 0–0.7)
EOSINOPHIL NFR BLD AUTO: 3 %
ERYTHROCYTE [DISTWIDTH] IN BLOOD BY AUTOMATED COUNT: 12 % (ref 10–15)
HBA1C MFR BLD: 5.5 % (ref 0–5.6)
HCT VFR BLD AUTO: 42 % (ref 40–53)
HGB BLD-MCNC: 14.8 G/DL (ref 13.3–17.7)
IGA SERPL-MCNC: 195 MG/DL (ref 84–499)
IMM GRANULOCYTES # BLD: 0 10E3/UL
IMM GRANULOCYTES NFR BLD: 0 %
LYMPHOCYTES # BLD AUTO: 1.5 10E3/UL (ref 0.8–5.3)
LYMPHOCYTES NFR BLD AUTO: 28 %
MCH RBC QN AUTO: 32.1 PG (ref 26.5–33)
MCHC RBC AUTO-ENTMCNC: 35.2 G/DL (ref 31.5–36.5)
MCV RBC AUTO: 91 FL (ref 78–100)
MONOCYTES # BLD AUTO: 0.5 10E3/UL (ref 0–1.3)
MONOCYTES NFR BLD AUTO: 9 %
NEUTROPHILS # BLD AUTO: 3.3 10E3/UL (ref 1.6–8.3)
NEUTROPHILS NFR BLD AUTO: 60 %
PLATELET # BLD AUTO: 251 10E3/UL (ref 150–450)
RBC # BLD AUTO: 4.61 10E6/UL (ref 4.4–5.9)
WBC # BLD AUTO: 5.5 10E3/UL (ref 4–11)

## 2024-05-02 PROCEDURE — 96127 BRIEF EMOTIONAL/BEHAV ASSMT: CPT | Performed by: FAMILY MEDICINE

## 2024-05-02 PROCEDURE — 83550 IRON BINDING TEST: CPT | Performed by: FAMILY MEDICINE

## 2024-05-02 PROCEDURE — 87340 HEPATITIS B SURFACE AG IA: CPT | Performed by: FAMILY MEDICINE

## 2024-05-02 PROCEDURE — 84165 PROTEIN E-PHORESIS SERUM: CPT | Performed by: STUDENT IN AN ORGANIZED HEALTH CARE EDUCATION/TRAINING PROGRAM

## 2024-05-02 PROCEDURE — 90471 IMMUNIZATION ADMIN: CPT | Performed by: FAMILY MEDICINE

## 2024-05-02 PROCEDURE — 85025 COMPLETE CBC W/AUTO DIFF WBC: CPT | Performed by: FAMILY MEDICINE

## 2024-05-02 PROCEDURE — 84439 ASSAY OF FREE THYROXINE: CPT | Performed by: FAMILY MEDICINE

## 2024-05-02 PROCEDURE — 83036 HEMOGLOBIN GLYCOSYLATED A1C: CPT | Performed by: FAMILY MEDICINE

## 2024-05-02 PROCEDURE — 36415 COLL VENOUS BLD VENIPUNCTURE: CPT | Performed by: FAMILY MEDICINE

## 2024-05-02 PROCEDURE — 90472 IMMUNIZATION ADMIN EACH ADD: CPT | Performed by: FAMILY MEDICINE

## 2024-05-02 PROCEDURE — 90677 PCV20 VACCINE IM: CPT | Performed by: FAMILY MEDICINE

## 2024-05-02 PROCEDURE — 90636 HEP A/HEP B VACC ADULT IM: CPT | Performed by: FAMILY MEDICINE

## 2024-05-02 PROCEDURE — 84155 ASSAY OF PROTEIN SERUM: CPT | Performed by: FAMILY MEDICINE

## 2024-05-02 PROCEDURE — 82728 ASSAY OF FERRITIN: CPT | Performed by: FAMILY MEDICINE

## 2024-05-02 PROCEDURE — 82784 ASSAY IGA/IGD/IGG/IGM EACH: CPT | Performed by: FAMILY MEDICINE

## 2024-05-02 PROCEDURE — 80053 COMPREHEN METABOLIC PANEL: CPT | Mod: 59 | Performed by: FAMILY MEDICINE

## 2024-05-02 PROCEDURE — 84443 ASSAY THYROID STIM HORMONE: CPT | Performed by: FAMILY MEDICINE

## 2024-05-02 PROCEDURE — 86364 TISS TRNSGLTMNASE EA IG CLAS: CPT | Performed by: FAMILY MEDICINE

## 2024-05-02 PROCEDURE — G0103 PSA SCREENING: HCPCS | Performed by: FAMILY MEDICINE

## 2024-05-02 PROCEDURE — 99396 PREV VISIT EST AGE 40-64: CPT | Mod: 25 | Performed by: FAMILY MEDICINE

## 2024-05-02 PROCEDURE — 99214 OFFICE O/P EST MOD 30 MIN: CPT | Mod: 25 | Performed by: FAMILY MEDICINE

## 2024-05-02 PROCEDURE — 83540 ASSAY OF IRON: CPT | Performed by: FAMILY MEDICINE

## 2024-05-02 PROCEDURE — 86803 HEPATITIS C AB TEST: CPT | Performed by: FAMILY MEDICINE

## 2024-05-02 ASSESSMENT — PATIENT HEALTH QUESTIONNAIRE - PHQ9
10. IF YOU CHECKED OFF ANY PROBLEMS, HOW DIFFICULT HAVE THESE PROBLEMS MADE IT FOR YOU TO DO YOUR WORK, TAKE CARE OF THINGS AT HOME, OR GET ALONG WITH OTHER PEOPLE: NOT DIFFICULT AT ALL
SUM OF ALL RESPONSES TO PHQ QUESTIONS 1-9: 0
SUM OF ALL RESPONSES TO PHQ QUESTIONS 1-9: 0

## 2024-05-02 ASSESSMENT — PAIN SCALES - GENERAL: PAINLEVEL: NO PAIN (0)

## 2024-05-02 NOTE — NURSING NOTE
Prior to immunization administration, verified patients identity using patient s name and date of birth. Please see Immunization Activity for additional information.     Screening Questionnaire for Adult Immunization    Are you sick today?   No   Do you have allergies to medications, food, a vaccine component or latex?   No   Have you ever had a serious reaction after receiving a vaccination?   No   Do you have a long-term health problem with heart, lung, kidney, or metabolic disease (e.g., diabetes), asthma, a blood disorder, no spleen, complement component deficiency, a cochlear implant, or a spinal fluid leak?  Are you on long-term aspirin therapy?   No   Do you have cancer, leukemia, HIV/AIDS, or any other immune system problem?   No   Do you have a parent, brother, or sister with an immune system problem?   No   In the past 3 months, have you taken medications that affect  your immune system, such as prednisone, other steroids, or anticancer drugs; drugs for the treatment of rheumatoid arthritis, Crohn s disease, or psoriasis; or have you had radiation treatments?   No   Have you had a seizure, or a brain or other nervous system problem?   No   During the past year, have you received a transfusion of blood or blood    products, or been given immune (gamma) globulin or antiviral drug?   No   For women: Are you pregnant or is there a chance you could become       pregnant during the next month?   No   Have you received any vaccinations in the past 4 weeks?   No     Immunization questionnaire answers were all negative.      Patient instructed to remain in clinic for 15 minutes afterwards, and to report any adverse reactions.     Screening performed by Prudence Castellano MA on 5/2/2024 at 7:59 AM.

## 2024-05-02 NOTE — PROGRESS NOTES
Preventive Care Visit  [facility]  Jaqui Esther Koch MD, Family Medicine  May 2, 2024      Assessment & Plan     Encounter for Medicare annual wellness exam  Declines covid 19 vaccine, first twinrix given and prevnar 20. Rsv vaccine later summer advised  - PRIMARY CARE FOLLOW-UP SCHEDULING    Class 1 obesity with serious comorbidity in adult, unspecified BMI, unspecified obesity type  Working with on-line clinic to get tirzepetide. Excellent wt loss and working on healthier habits. Tolerating medication without issue  - Comprehensive metabolic panel (BMP + Alb, Alk Phos, ALT, AST, Total. Bili, TP); Future    Elevated LFTs  Fatty liver  Finish work up for elevated lfts planned but fatty liver most likely etiology.. Will complete fib-4 scoring. Continue with wt loss/healthy diet/exercise  - Protein electrophoresis  - Hepatitis B surface antigen  - Hepatitis C antibody  - Iron and iron binding capacity  - Ferritin  - Tissue transglutaminase fariha IgA and IgG  - IgA  - Comprehensive metabolic panel (BMP + Alb, Alk Phos, ALT, AST, Total. Bili, TP); Future  - CBC with platelets and differential; Future    Major depressive disorder, recurrent episode, moderate (H)  Under excellent control on current regimin. Continue buproprioon    Benign hypertension  At goal on hydrochlorothiazide. Discussed watching for orthostatic sx's/low bp while losing wt as may be able to drop down on bp meds. He will message me if concerns.   - Comprehensive metabolic panel (BMP + Alb, Alk Phos, ALT, AST, Total. Bili, TP); Future    Erectile dysfunction, unspecified erectile dysfunction type  Not yet tried the sildenafil    VANESSA on CPAP  Modafanil for excessive fatigue    Elevated fasting glucose  - Hemoglobin A1c; Future  - Comprehensive metabolic panel (BMP + Alb, Alk Phos, ALT, AST, Total. Bili, TP); Future    Fatigue, unspecified type  Monitoring tsh which has been elevated at times  - TSH with free T4 reflex;  Future  - Comprehensive metabolic panel (BMP + Alb, Alk Phos, ALT, AST, Total. Bili, TP); Future    Screening for prostate cancer  - PSA, screen; Future              Counseling  Appropriate preventive services were discussed with this patient, including applicable screening as appropriate for fall prevention, nutrition, physical activity, Tobacco-use cessation, weight loss and cognition.  Checklist reviewing preventive services available has been given to the patient.  Reviewed patient's diet, addressing concerns and/or questions.   He is at risk for psychosocial distress and has been provided with information to reduce risk.       See Patient Instructions  Ok for yearly follow up if labs stable and continues to feel well    Subjective   Syed is a 58 year old, presenting for the following:  Physical        5/2/2024     6:54 AM   Additional Questions   Roomed by Janay ALMARAZ   Accompanied by self         5/2/2024     6:54 AM   Patient Reported Additional Medications   Patient reports taking the following new medications None        Health Care Directive  Patient does not have a Health Care Directive or Living Will: Discussed advance care planning with patient; however, patient declined at this time.    Healthy Habits:     Getting at least 3 servings of Calcium per day:  NO    Bi-annual eye exam:  NO    Dental care twice a year:  Yes    Sleep apnea or symptoms of sleep apnea:  Sleep apnea and Excessive snoring    Frequency of exercise:  4-5 days/week    Duration of exercise:  30-45 minutes    Taking medications regularly:  Yes    Barriers to taking medications:  None    Medication side effects:  None    Additional concerns today:  Yes ([employer]/Mariola Geotender)    Tirzepatide 5 mg  daily   Lost 20 lbs   About 1500 bryson per day  No n/v/abd pain/constipation/diarrhea   Also started exercising (hadn't been previously)  Trying to eat less breads/potatoes/processed.     Possibly less fatigue since wt loss.            4/25/2024   General Health   How would you rate your overall physical health? Good   Feel stress (tense, anxious, or unable to sleep) Only a little   (!) STRESS CONCERN      4/25/2024   Nutrition   Three or more servings of calcium each day? (!) NO   Diet: Carbohydrate counting    Other   If other, please elaborate: Counting Calories   How many servings of fruit and vegetables per day? (!) 2-3   How many sweetened beverages each day? 0-1         4/25/2024   Exercise   Days per week of moderate/strenous exercise 4 days   Average minutes spent exercising at this level 40 min         4/25/2024   Social Factors   Frequency of gathering with friends or relatives Once a week   Worry food won't last until get money to buy more No   Food not last or not have enough money for food? No   Do you have housing?  Yes   Are you worried about losing your housing? No   Lack of transportation? No   Unable to get utilities (heat,electricity)? No         4/25/2024   Fall Risk   Fallen 2 or more times in the past year? No   Trouble with walking or balance? No          4/25/2024   Dental   Dentist two times every year? Yes         4/25/2024   TB Screening   Were you born outside of the [place]? No       Today's PHQ-9 Score:       5/2/2024     6:49 AM   PHQ-9 SCORE   PHQ-9 Total Score MyChart 0   PHQ-9 Total Score 0         4/25/2024   Substance Use   Alcohol more than 3/day or more than 7/wk No   Do you use any other substances recreationally? No     Social History     Tobacco Use    Smoking status: Never     Passive exposure: Never    Smokeless tobacco: Never   Vaping Use    Vaping status: Never Used   Substance Use Topics    Alcohol use: Yes     Alcohol/week: 0.0 - 1.0 standard drinks of alcohol     Comment: 2 drinks per week    Drug use: No           4/25/2024   STI Screening   New sexual partner(s) since last STI/HIV test? No   Last PSA:   PSA   Date Value Ref Range Status   05/25/2021 0.65 0 - 4 ug/L Final     Comment:     Assay Method:   "Chemiluminescence using Siemens Vista analyzer     Prostate Specific Antigen Screen   Date Value Ref Range Status   05/03/2023 0.60 0.00 - 3.50 ng/mL Final   03/08/2022 0.88 0.00 - 4.00 ug/L Final     ASCVD Risk   The 10-year ASCVD risk score (Archie WILSON, et al., 2019) is: 8.9%    Values used to calculate the score:      Age: 58 years      Sex: Male      Is Non- : No      Diabetic: No      Tobacco smoker: No      Systolic Blood Pressure: 112 mmHg      Is BP treated: Yes      HDL Cholesterol: 30 mg/dL      Total Cholesterol: 163 mg/dL           Reviewed and updated as needed this visit by Provider                          Review of Systems  Constitutional, neuro, ENT, endocrine, pulmonary, cardiac, gastrointestinal, genitourinary, musculoskeletal, integument and psychiatric systems are negative, except as otherwise noted.     Objective    Exam  /72 (BP Location: Right arm, Patient Position: Sitting, Cuff Size: Adult Large)   Pulse 50   Temp 98  F (36.7  C) (Oral)   Resp 12   Ht 1.842 m (6' 0.5\")   Wt 109.1 kg (240 lb 7 oz)   SpO2 100%   BMI 32.16 kg/m     Estimated body mass index is 32.16 kg/m  as calculated from the following:    Height as of this encounter: 1.842 m (6' 0.5\").    Weight as of this encounter: 109.1 kg (240 lb 7 oz).    Physical Exam          Signed Electronically by: Jaqui Koch MD    Answers submitted by the patient for this visit:  Patient Health Questionnaire (Submitted on 5/2/2024)  If you checked off any problems, how difficult have these problems made it for you to do your work, take care of things at home, or get along with other people?: Not difficult at all  PHQ9 TOTAL SCORE: 0    "

## 2024-05-03 LAB
ALBUMIN SERPL BCG-MCNC: 4.9 G/DL (ref 3.5–5.2)
ALBUMIN SERPL ELPH-MCNC: 4.7 G/DL (ref 3.7–5.1)
ALP SERPL-CCNC: 82 U/L (ref 40–150)
ALPHA1 GLOB SERPL ELPH-MCNC: 0.2 G/DL (ref 0.2–0.4)
ALPHA2 GLOB SERPL ELPH-MCNC: 0.5 G/DL (ref 0.5–0.9)
ALT SERPL W P-5'-P-CCNC: 68 U/L (ref 0–70)
ANION GAP SERPL CALCULATED.3IONS-SCNC: 11 MMOL/L (ref 7–15)
AST SERPL W P-5'-P-CCNC: 35 U/L (ref 0–45)
B-GLOBULIN SERPL ELPH-MCNC: 0.8 G/DL (ref 0.6–1)
BILIRUB SERPL-MCNC: 0.9 MG/DL
BUN SERPL-MCNC: 19.2 MG/DL (ref 8–23)
CALCIUM SERPL-MCNC: 9.6 MG/DL (ref 8.6–10)
CHLORIDE SERPL-SCNC: 103 MMOL/L (ref 98–107)
CREAT SERPL-MCNC: 1.29 MG/DL (ref 0.67–1.17)
DEPRECATED HCO3 PLAS-SCNC: 27 MMOL/L (ref 22–29)
EGFRCR SERPLBLD CKD-EPI 2021: 64 ML/MIN/1.73M2
FERRITIN SERPL-MCNC: 388 NG/ML (ref 31–409)
GAMMA GLOB SERPL ELPH-MCNC: 0.8 G/DL (ref 0.7–1.6)
GLUCOSE SERPL-MCNC: 87 MG/DL (ref 70–99)
HBV SURFACE AG SERPL QL IA: NONREACTIVE
HCV AB SERPL QL IA: NONREACTIVE
IRON BINDING CAPACITY (ROCHE): 323 UG/DL (ref 240–430)
IRON SATN MFR SERPL: 25 % (ref 15–46)
IRON SERPL-MCNC: 82 UG/DL (ref 61–157)
M PROTEIN SERPL ELPH-MCNC: 0 G/DL
POTASSIUM SERPL-SCNC: 4.3 MMOL/L (ref 3.4–5.3)
PROT PATTERN SERPL ELPH-IMP: NORMAL
PROT SERPL-MCNC: 7.2 G/DL (ref 6.4–8.3)
PSA SERPL DL<=0.01 NG/ML-MCNC: 0.57 NG/ML (ref 0–3.5)
SODIUM SERPL-SCNC: 141 MMOL/L (ref 135–145)
T4 FREE SERPL-MCNC: 1.47 NG/DL (ref 0.9–1.7)
TOTAL PROTEIN SERUM FOR ELP: 7 G/DL (ref 6.4–8.3)
TSH SERPL DL<=0.005 MIU/L-ACNC: 4.24 UIU/ML (ref 0.3–4.2)

## 2024-05-04 DIAGNOSIS — R94.6 THYROID FUNCTION TEST ABNORMAL: Primary | ICD-10-CM

## 2024-05-04 NOTE — RESULT ENCOUNTER NOTE
FIB-4: 1.00, low risk    Syed,  It was a pleasure to see you in the office recently.   - kidney, liver and electrolyte panel looks good. Its great to see your liver tests back down!  - The fasting blood glucose (diabetes screening test) was negative/normal.  The A1C test which looks at glucose average over three months was also normal.   - Hepatitis B and C viral tests were negative. Protein levels were normal. Blood count panel and iron levels were good. All these tests were to check for additional things that can cause liver irritation so it's good all was well.   - The PSA level (prostate cancer screening test) was negative/normal.   - thyroid function test was again borderline elevated. Thyroid hormone levels (T4) are good still. No need for intervention with these numbers but let's continue to monitor. Plan to recheck thyroid tests in 3-6 months. I'll place future orders for this.   Please MyChart or call if you have any concerns or questions.   Sincerely,  Jaqui Koch MD

## 2024-05-06 LAB
TTG IGA SER-ACNC: 0.6 U/ML
TTG IGG SER-ACNC: <0.6 U/ML

## 2024-05-07 NOTE — RESULT ENCOUNTER NOTE
Ps.  Last result is back.  Celiac screening labs were negative (done to evaluate liver health).   Kind regards,  Jaqui Koch MD

## 2024-05-10 DIAGNOSIS — E78.5 HYPERLIPIDEMIA LDL GOAL <160: ICD-10-CM

## 2024-05-10 RX ORDER — ATORVASTATIN CALCIUM 40 MG/1
40 TABLET, FILM COATED ORAL DAILY
Qty: 90 TABLET | Refills: 1 | Status: SHIPPED | OUTPATIENT
Start: 2024-05-10

## 2024-05-26 DIAGNOSIS — R39.9 LOWER URINARY TRACT SYMPTOMS (LUTS): ICD-10-CM

## 2024-05-28 RX ORDER — TAMSULOSIN HYDROCHLORIDE 0.4 MG/1
0.4 CAPSULE ORAL DAILY
Qty: 90 CAPSULE | Refills: 3 | Status: SHIPPED | OUTPATIENT
Start: 2024-05-28

## 2024-06-10 ENCOUNTER — DOCUMENTATION ONLY (OUTPATIENT)
Dept: FAMILY MEDICINE | Facility: CLINIC | Age: 59
End: 2024-06-10

## 2024-06-10 ENCOUNTER — ALLIED HEALTH/NURSE VISIT (OUTPATIENT)
Dept: FAMILY MEDICINE | Facility: CLINIC | Age: 59
End: 2024-06-10
Payer: COMMERCIAL

## 2024-06-10 DIAGNOSIS — Z28.09: Primary | ICD-10-CM

## 2024-06-10 DIAGNOSIS — Z23 ENCOUNTER FOR IMMUNIZATION: Primary | ICD-10-CM

## 2024-06-10 PROCEDURE — 90636 HEP A/HEP B VACC ADULT IM: CPT

## 2024-06-10 PROCEDURE — 99207 PR NO CHARGE NURSE ONLY: CPT

## 2024-06-10 PROCEDURE — 90471 IMMUNIZATION ADMIN: CPT

## 2024-06-10 NOTE — PROGRESS NOTES
Prior to immunization administration, verified patients identity using patient s name and date of birth. Please see Immunization Activity for additional information.     Screening Questionnaire for Adult Immunization    Are you sick today?   No   Do you have allergies to medications, food, a vaccine component or latex?   No   Have you ever had a serious reaction after receiving a vaccination?   No   Do you have a long-term health problem with heart, lung, kidney, or metabolic disease (e.g., diabetes), asthma, a blood disorder, no spleen, complement component deficiency, a cochlear implant, or a spinal fluid leak?  Are you on long-term aspirin therapy?   No   Do you have cancer, leukemia, HIV/AIDS, or any other immune system problem?   No   Do you have a parent, brother, or sister with an immune system problem?   No   In the past 3 months, have you taken medications that affect  your immune system, such as prednisone, other steroids, or anticancer drugs; drugs for the treatment of rheumatoid arthritis, Crohn s disease, or psoriasis; or have you had radiation treatments?   No   Have you had a seizure, or a brain or other nervous system problem?   No   During the past year, have you received a transfusion of blood or blood    products, or been given immune (gamma) globulin or antiviral drug?   No   For women: Are you pregnant or is there a chance you could become       pregnant during the next month?   No   Have you received any vaccinations in the past 4 weeks?   No     Immunization questionnaire answers were all negative.    I have reviewed the following standing orders:     This patient is due and qualifies for the Hepatitis A & B vaccine.    Click here for HEP A & B STANDING ORDER    I have reviewed the vaccines inclusion and exclusion criteria; No concerns regarding eligibility.     Patient instructed to remain in clinic for 15 minutes afterwards, and to report any adverse reactions.     Screening performed by  Cherri Riojas MA on 6/10/2024 at 10:48 AM.

## 2024-06-13 ENCOUNTER — MYC MEDICAL ADVICE (OUTPATIENT)
Dept: FAMILY MEDICINE | Facility: CLINIC | Age: 59
End: 2024-06-13
Payer: COMMERCIAL

## 2024-06-13 DIAGNOSIS — R79.89 ELEVATED LFTS: Primary | ICD-10-CM

## 2024-06-14 DIAGNOSIS — I10 BENIGN HYPERTENSION: ICD-10-CM

## 2024-06-14 RX ORDER — HYDROCHLOROTHIAZIDE 25 MG/1
TABLET ORAL
Qty: 90 TABLET | Refills: 0 | Status: SHIPPED | OUTPATIENT
Start: 2024-06-14 | End: 2024-09-12

## 2024-07-02 DIAGNOSIS — F33.1 MAJOR DEPRESSIVE DISORDER, RECURRENT EPISODE, MODERATE (H): ICD-10-CM

## 2024-07-02 RX ORDER — BUPROPION HYDROCHLORIDE 300 MG/1
TABLET ORAL
Qty: 90 TABLET | Refills: 2 | Status: SHIPPED | OUTPATIENT
Start: 2024-07-02

## 2024-08-08 DIAGNOSIS — G47.33 OSA ON CPAP: ICD-10-CM

## 2024-08-08 DIAGNOSIS — R53.83 FATIGUE, UNSPECIFIED TYPE: ICD-10-CM

## 2024-08-08 RX ORDER — MODAFINIL 200 MG/1
200 TABLET ORAL DAILY
Qty: 90 TABLET | Refills: 1 | Status: SHIPPED | OUTPATIENT
Start: 2024-08-08 | End: 2024-08-13

## 2024-08-08 NOTE — TELEPHONE ENCOUNTER
Medication Question or Refill    Contacts       Contact Date/Time Type Contact Phone/Fax    08/08/2024 10:04 AM CDT Phone (Incoming) [facility] - [address] (Pharmacy) [phone]            What medication are you calling about (include dose and sig)?: Modafinil 200 MG     Preferred Pharmacy:  [facility] - [address] [address]  Phone: [phone] Fax: [phone]      Controlled Substance Agreement on file:   CSA -- Patient Level:    CSA: None found at the patient level.       Who prescribed the medication?: Jaqui Koch     Do you need a refill? Yes

## 2024-08-09 ENCOUNTER — LAB (OUTPATIENT)
Dept: LAB | Facility: CLINIC | Age: 59
End: 2024-08-09
Payer: COMMERCIAL

## 2024-08-09 DIAGNOSIS — R79.89 ELEVATED LFTS: ICD-10-CM

## 2024-08-09 DIAGNOSIS — E78.5 HYPERLIPIDEMIA LDL GOAL <160: ICD-10-CM

## 2024-08-09 LAB
CHOLEST SERPL-MCNC: 145 MG/DL
FASTING STATUS PATIENT QL REPORTED: YES
HDLC SERPL-MCNC: 35 MG/DL
LDLC SERPL CALC-MCNC: 89 MG/DL
LIPASE SERPL-CCNC: 22 U/L (ref 13–60)
NONHDLC SERPL-MCNC: 110 MG/DL
TRIGL SERPL-MCNC: 107 MG/DL

## 2024-08-09 PROCEDURE — 83690 ASSAY OF LIPASE: CPT

## 2024-08-09 PROCEDURE — 80061 LIPID PANEL: CPT

## 2024-08-09 PROCEDURE — 36415 COLL VENOUS BLD VENIPUNCTURE: CPT

## 2024-08-09 NOTE — RESULT ENCOUNTER NOTE
Syed,  Cholesterol panel looks pretty good with improvement in the HDL (good cholesterol) somewhat. LDL (bad cholesterol) remains quite good.   The pancreas test was negative.   Please MyChart or call if you have any concerns or questions.   Sincerely,  Jaqui Koch MD

## 2024-08-12 ENCOUNTER — MYC MEDICAL ADVICE (OUTPATIENT)
Dept: FAMILY MEDICINE | Facility: CLINIC | Age: 59
End: 2024-08-12
Payer: COMMERCIAL

## 2024-08-12 ENCOUNTER — TELEPHONE (OUTPATIENT)
Dept: FAMILY MEDICINE | Facility: CLINIC | Age: 59
End: 2024-08-12
Payer: COMMERCIAL

## 2024-08-12 DIAGNOSIS — G47.10 HYPERSOMNOLENCE: Primary | ICD-10-CM

## 2024-08-13 RX ORDER — MODAFINIL 200 MG/1
200 TABLET ORAL DAILY
Qty: 90 TABLET | Refills: 1 | Status: SHIPPED | OUTPATIENT
Start: 2024-08-13

## 2024-08-13 NOTE — TELEPHONE ENCOUNTER
PA came back denied for Modafinil on 8/13/24. Please see denial letter below.           Routing to provider to review and advise.     Kajal Barreto, KYLEIGHN, RN   [facility]

## 2024-08-13 NOTE — TELEPHONE ENCOUNTER
PRIOR AUTHORIZATION DENIED    Medication: MODAFINIL 200 MG PO TABS  Insurance Company: [employer] - Phone [phone] Fax [phone]  Denial Date: 8/10/2024  Denial Reason(s): Medical criteria not met      Appeal Information:   Patient Notified: No

## 2024-09-04 ENCOUNTER — LAB (OUTPATIENT)
Dept: LAB | Facility: CLINIC | Age: 59
End: 2024-09-04
Payer: COMMERCIAL

## 2024-09-04 DIAGNOSIS — R94.6 THYROID FUNCTION TEST ABNORMAL: ICD-10-CM

## 2024-09-04 LAB
HOLD SPECIMEN: NORMAL
TSH SERPL DL<=0.005 MIU/L-ACNC: 3.53 UIU/ML (ref 0.3–4.2)

## 2024-09-04 PROCEDURE — 36415 COLL VENOUS BLD VENIPUNCTURE: CPT

## 2024-09-04 PROCEDURE — 84443 ASSAY THYROID STIM HORMONE: CPT

## 2024-09-05 NOTE — RESULT ENCOUNTER NOTE
Syed,  Repeat thyroid test is back to the normal range again.  We will check this again at your next annual visit.  Kind regards,  Jaqui Koch MD

## 2024-09-12 DIAGNOSIS — I10 BENIGN HYPERTENSION: ICD-10-CM

## 2024-09-12 RX ORDER — HYDROCHLOROTHIAZIDE 25 MG/1
TABLET ORAL
Qty: 90 TABLET | Refills: 1 | Status: SHIPPED | OUTPATIENT
Start: 2024-09-12

## 2024-11-06 DIAGNOSIS — E78.5 HYPERLIPIDEMIA LDL GOAL <160: ICD-10-CM

## 2024-11-06 RX ORDER — ATORVASTATIN CALCIUM 40 MG/1
40 TABLET, FILM COATED ORAL DAILY
Qty: 90 TABLET | Refills: 0 | Status: SHIPPED | OUTPATIENT
Start: 2024-11-06

## 2025-02-04 DIAGNOSIS — E78.5 HYPERLIPIDEMIA LDL GOAL <160: ICD-10-CM

## 2025-02-04 RX ORDER — ATORVASTATIN CALCIUM 40 MG/1
40 TABLET, FILM COATED ORAL DAILY
Qty: 90 TABLET | Refills: 0 | Status: SHIPPED | OUTPATIENT
Start: 2025-02-04

## 2025-03-10 DIAGNOSIS — I10 BENIGN HYPERTENSION: ICD-10-CM

## 2025-03-11 RX ORDER — HYDROCHLOROTHIAZIDE 25 MG/1
TABLET ORAL
Qty: 90 TABLET | Refills: 0 | Status: SHIPPED | OUTPATIENT
Start: 2025-03-11

## 2025-03-31 DIAGNOSIS — F33.1 MAJOR DEPRESSIVE DISORDER, RECURRENT EPISODE, MODERATE (H): ICD-10-CM

## 2025-03-31 RX ORDER — BUPROPION HYDROCHLORIDE 300 MG/1
TABLET ORAL
Qty: 90 TABLET | Refills: 0 | Status: SHIPPED | OUTPATIENT
Start: 2025-03-31

## 2025-04-30 ENCOUNTER — TELEPHONE (OUTPATIENT)
Dept: FAMILY MEDICINE | Facility: CLINIC | Age: 60
End: 2025-04-30
Payer: COMMERCIAL

## 2025-04-30 NOTE — TELEPHONE ENCOUNTER
Reason for Call:  Appointment Request    Patient requesting this type of appt:  Preventive     Requested provider: Jaqui Koch    Reason patient unable to be scheduled: Not within requested timeframe    When does patient want to be seen/preferred time:  anything    Comments: PCP does not have anything until November is there a way to get in sooner?    Could we send this information to you in [facility] or would you prefer to receive a phone call?:   Patient would prefer a phone call   Okay to leave a detailed message?: Yes at Home number on file [phone] (home)    Call taken on 4/30/2025 at 10:13 AM by Kelly Terrell

## 2025-05-01 NOTE — TELEPHONE ENCOUNTER
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   I would recommend he get on the wait list as I have had patients recently have some success with getting in earlier that way.    Happy to have virtual visit  for med check/review of  health.   If more urgent medical concern present please have him triaged.

## 2025-05-05 DIAGNOSIS — E78.5 HYPERLIPIDEMIA LDL GOAL <160: ICD-10-CM

## 2025-05-05 RX ORDER — ATORVASTATIN CALCIUM 40 MG/1
40 TABLET, FILM COATED ORAL DAILY
Qty: 90 TABLET | Refills: 1 | Status: SHIPPED | OUTPATIENT
Start: 2025-05-05

## 2025-05-22 DIAGNOSIS — R39.9 LOWER URINARY TRACT SYMPTOMS (LUTS): ICD-10-CM

## 2025-05-22 RX ORDER — TAMSULOSIN HYDROCHLORIDE 0.4 MG/1
0.4 CAPSULE ORAL DAILY
Qty: 90 CAPSULE | Refills: 1 | Status: SHIPPED | OUTPATIENT
Start: 2025-05-22

## 2025-06-08 DIAGNOSIS — I10 BENIGN HYPERTENSION: ICD-10-CM

## 2025-06-09 RX ORDER — HYDROCHLOROTHIAZIDE 25 MG/1
TABLET ORAL
Qty: 90 TABLET | Refills: 0 | Status: SHIPPED | OUTPATIENT
Start: 2025-06-09

## 2025-07-14 ENCOUNTER — OFFICE VISIT (OUTPATIENT)
Dept: FAMILY MEDICINE | Facility: CLINIC | Age: 60
End: 2025-07-14
Payer: COMMERCIAL

## 2025-07-14 VITALS
DIASTOLIC BLOOD PRESSURE: 69 MMHG | OXYGEN SATURATION: 98 % | SYSTOLIC BLOOD PRESSURE: 112 MMHG | WEIGHT: 191.3 LBS | TEMPERATURE: 98 F | HEIGHT: 73 IN | RESPIRATION RATE: 16 BRPM | HEART RATE: 51 BPM | BODY MASS INDEX: 25.35 KG/M2

## 2025-07-14 DIAGNOSIS — H02.403 PTOSIS OF BOTH UPPER EYELIDS: ICD-10-CM

## 2025-07-14 DIAGNOSIS — M79.601: ICD-10-CM

## 2025-07-14 DIAGNOSIS — G47.33 OSA ON CPAP: ICD-10-CM

## 2025-07-14 DIAGNOSIS — I10 HYPERTENSION GOAL BP (BLOOD PRESSURE) < 140/90: ICD-10-CM

## 2025-07-14 DIAGNOSIS — E66.811 CLASS 1 OBESITY WITH SERIOUS COMORBIDITY IN ADULT, UNSPECIFIED BMI, UNSPECIFIED OBESITY TYPE: ICD-10-CM

## 2025-07-14 DIAGNOSIS — E78.5 HYPERLIPIDEMIA LDL GOAL <130: ICD-10-CM

## 2025-07-14 DIAGNOSIS — G47.10 HYPERSOMNOLENCE: ICD-10-CM

## 2025-07-14 DIAGNOSIS — Z01.818 PREOP GENERAL PHYSICAL EXAM: Primary | ICD-10-CM

## 2025-07-14 LAB
ALBUMIN SERPL BCG-MCNC: 4.3 G/DL (ref 3.5–5.2)
ALP SERPL-CCNC: 81 U/L (ref 40–150)
ALT SERPL W P-5'-P-CCNC: 58 U/L (ref 0–70)
ANION GAP SERPL CALCULATED.3IONS-SCNC: 9 MMOL/L (ref 7–15)
AST SERPL W P-5'-P-CCNC: 35 U/L (ref 0–45)
BASOPHILS # BLD AUTO: 0 10E3/UL (ref 0–0.2)
BASOPHILS NFR BLD AUTO: 1 %
BILIRUB SERPL-MCNC: 0.7 MG/DL
BUN SERPL-MCNC: 14.8 MG/DL (ref 8–23)
CALCIUM SERPL-MCNC: 9.7 MG/DL (ref 8.8–10.4)
CHLORIDE SERPL-SCNC: 105 MMOL/L (ref 98–107)
CREAT SERPL-MCNC: 1.08 MG/DL (ref 0.67–1.17)
EGFRCR SERPLBLD CKD-EPI 2021: 78 ML/MIN/1.73M2
EOSINOPHIL # BLD AUTO: 0.3 10E3/UL (ref 0–0.7)
EOSINOPHIL NFR BLD AUTO: 5 %
ERYTHROCYTE [DISTWIDTH] IN BLOOD BY AUTOMATED COUNT: 12.2 % (ref 10–15)
GLUCOSE SERPL-MCNC: 84 MG/DL (ref 70–99)
HCO3 SERPL-SCNC: 27 MMOL/L (ref 22–29)
HCT VFR BLD AUTO: 40.5 % (ref 40–53)
HGB BLD-MCNC: 13.9 G/DL (ref 13.3–17.7)
IMM GRANULOCYTES # BLD: 0 10E3/UL
IMM GRANULOCYTES NFR BLD: 0 %
LYMPHOCYTES # BLD AUTO: 1.7 10E3/UL (ref 0.8–5.3)
LYMPHOCYTES NFR BLD AUTO: 30 %
MCH RBC QN AUTO: 31.6 PG (ref 26.5–33)
MCHC RBC AUTO-ENTMCNC: 34.3 G/DL (ref 31.5–36.5)
MCV RBC AUTO: 92 FL (ref 78–100)
MONOCYTES # BLD AUTO: 0.5 10E3/UL (ref 0–1.3)
MONOCYTES NFR BLD AUTO: 9 %
NEUTROPHILS # BLD AUTO: 3.2 10E3/UL (ref 1.6–8.3)
NEUTROPHILS NFR BLD AUTO: 56 %
PLATELET # BLD AUTO: 231 10E3/UL (ref 150–450)
POTASSIUM SERPL-SCNC: 4.3 MMOL/L (ref 3.4–5.3)
PROT SERPL-MCNC: 6.8 G/DL (ref 6.4–8.3)
RBC # BLD AUTO: 4.4 10E6/UL (ref 4.4–5.9)
SODIUM SERPL-SCNC: 141 MMOL/L (ref 135–145)
WBC # BLD AUTO: 5.6 10E3/UL (ref 4–11)

## 2025-07-14 PROCEDURE — 1125F AMNT PAIN NOTED PAIN PRSNT: CPT | Performed by: FAMILY MEDICINE

## 2025-07-14 PROCEDURE — 36415 COLL VENOUS BLD VENIPUNCTURE: CPT | Performed by: FAMILY MEDICINE

## 2025-07-14 PROCEDURE — 3074F SYST BP LT 130 MM HG: CPT | Performed by: FAMILY MEDICINE

## 2025-07-14 PROCEDURE — 3078F DIAST BP <80 MM HG: CPT | Performed by: FAMILY MEDICINE

## 2025-07-14 PROCEDURE — 80053 COMPREHEN METABOLIC PANEL: CPT | Performed by: FAMILY MEDICINE

## 2025-07-14 PROCEDURE — 85025 COMPLETE CBC W/AUTO DIFF WBC: CPT | Performed by: FAMILY MEDICINE

## 2025-07-14 PROCEDURE — 99214 OFFICE O/P EST MOD 30 MIN: CPT | Performed by: FAMILY MEDICINE

## 2025-07-14 RX ORDER — MODAFINIL 200 MG/1
200 TABLET ORAL DAILY
Qty: 90 TABLET | Refills: 0 | Status: SHIPPED | OUTPATIENT
Start: 2025-07-14

## 2025-07-14 ASSESSMENT — PATIENT HEALTH QUESTIONNAIRE - PHQ9
SUM OF ALL RESPONSES TO PHQ QUESTIONS 1-9: 2
SUM OF ALL RESPONSES TO PHQ QUESTIONS 1-9: 2
10. IF YOU CHECKED OFF ANY PROBLEMS, HOW DIFFICULT HAVE THESE PROBLEMS MADE IT FOR YOU TO DO YOUR WORK, TAKE CARE OF THINGS AT HOME, OR GET ALONG WITH OTHER PEOPLE: NOT DIFFICULT AT ALL

## 2025-07-14 ASSESSMENT — PAIN SCALES - GENERAL: PAINLEVEL_OUTOF10: MILD PAIN (1)

## 2025-07-14 NOTE — PATIENT INSTRUCTIONS
How to Take Your Medication Before Surgery  Preoperative Medication Instructions   Antiplatelet or Anticoagulation Medication Instructions   - aspirin: Discontinue aspirin 7 days prior to procedure to reduce bleeding risk. It should be resumed postoperatively.     Additional Medication Instructions  Take medication as prescribed with exceptions as noted below.     - Diuretics (furosemide, hydrochlorothiazide, chlorothalidone): DO NOT TAKE on the day of surgery.  Take after surgery completed.   - Statins (atorvastatin, simvastatin, pravastatin) : Continue taking on the day of surgery.    - GLP-1 Injectable (Tirzepatide): DO NOT TAKE 7 days before surgery        Patient Education   Preparing for Your Surgery  For Adults  Getting started  In most cases, a nurse will call to review your health history and instructions. They will give you an arrival time based on your scheduled surgery time. Please be ready to share:  Your doctor's clinic name and phone number  Your medical, surgical, and anesthesia history  A list of allergies and sensitivities  A list of medicines, including herbal treatments and over-the-counter drugs  Whether the patient has a legal guardian (ask how to send us the papers in advance)  Note: You may not receive a call if you were seen at our PAC (Preoperative Assessment Center).  Please tell us if you're pregnant--or if there's any chance you might be pregnant. Some surgeries may injure a fetus (unborn baby), so they require a pregnancy test. Surgeries that are safe for a fetus don't always need a test, and you can choose whether to have one.   Preparing for surgery  Within 10 to 30 days of surgery: Have a pre-op exam (sometimes called an H&P, or History and Physical). This can be done at a clinic or pre-operative center.  If you're having a , you may not need this exam. Talk to your care team.  At your pre-op exam, talk to your care team about all medicines you take. (This includes CBD oil  and any drugs, such as THC, marijuana, and other forms of cannabis.) If you need to stop any medicine before surgery, ask when to start taking it again.  This is for your safety. Many medicines and drugs can make you bleed too much during surgery. Some change how well surgery (anesthesia) drugs work.  Call your insurance company to let them know you're having surgery. (If you don't have insurance, call [phone].)  Call your clinic if there's any change in your health. This includes a scrape or scratch near the surgery site, or any signs of a cold (sore throat, runny nose, cough, rash, fever).  Eating and drinking guidelines  For your safety: Unless your surgeon tells you otherwise, follow the guidelines below.  Eat and drink as normal until 8 hours before you arrive for surgery. After that, no food or milk. You can spit out gum when you arrive.  Drink clear liquids until 2 hours before you arrive. These are liquids you can see through, like water, Gatorade, and Propel Water. They also include plain black coffee and tea (no cream or milk).  No alcohol for 24 hours before you arrive. The night before surgery, stop any drinks that contain THC.  If your care team tells you to take medicine on the morning of surgery, it's okay to take it with a sip of water. No other medicines or drugs are allowed (including CBD oil)--follow your care team's instructions.  If you have questions the day of surgery, call your hospital or surgery center.   Preventing infection  Shower or bathe the night before and the morning of surgery. Follow the instructions your clinic gave you. (If no instructions, use regular soap.)  Don't shave or clip hair near your surgery site. We'll remove the hair if needed.  Don't smoke or vape the morning of surgery. No chewing tobacco for 6 hours before you arrive. A nicotine patch is okay. You may spit out nicotine gum when you arrive.  For some surgeries, the surgeon will tell you to fully quit smoking  and nicotine.  We will make every effort to keep you safe from infection. We will:  Clean our hands often with soap and water (or an alcohol-based hand rub).  Clean the skin at your surgery site with a special soap that kills germs.  Give you a special gown to keep you warm. (Cold raises the risk of infection.)  Wear hair covers, masks, gowns, and gloves during surgery.  Give antibiotic medicine, if prescribed. Not all surgeries need this medicine.  What to bring on the day of surgery  Photo ID and insurance card  Copy of your health care directive, if you have one  Glasses and hearing aids (bring cases)  You can't wear contacts during surgery  Inhaler and eye drops, if you use them (tell us about these when you arrive)  CPAP machine or breathing device, if you use them  A few personal items, if spending the night  If you have . . .  A pacemaker, ICD (cardiac defibrillator), or other implant: Bring the ID card.  An implanted stimulator: Bring the remote control.  A legal guardian: Bring a copy of the certified (court-stamped) guardianship papers.  Please remove any jewelry, including body piercings. Leave jewelry and other valuables at home.  If you're going home the day of surgery  You must have a support person drive you home. They should stay with you overnight, and they may need to help with your self-care.  If you don't have a support person, please tells us as soon as possible. We can help.  After surgery  If it's hard to control your pain or you need more pain medicine, please call your surgeon's office.  Questions?   If you have any questions for your care team, list them here:   ____________________________________________________________________________________________________________________________________________________________________________________________________________________________________________________________  For informational purposes only. Not to replace the advice of your health care  provider. Copyright   2003, 2019 [facility]. All rights reserved. Clinically reviewed by Casper Borjas MD. Pre[employer] [id number] - REV 02/25.         For the nasal drainage symptoms you can use claritin (loratidine) 10 mg OR zyrtec (cetrizine) 10 mg OR allegra (Fexofenadine) 180 mg. Each of these are dosed once daily.  Alternatively you can use the Flonase type of medication consistently and the antihistamine type above on an as needed basis.  If these are not effective, allergy evaluation is recommended.    Resume the Modafanil - follow up with Dr. Koch for additional adjustment and refills.     Referral to PHYSICAL THERAPY for right arm symptoms.  Someone will contact you to schedule that appointment.

## 2025-07-14 NOTE — PROGRESS NOTES
Preoperative Evaluation  [address] [address]  Phone: [phone]  Primary Provider: Jaqui Koch MD  Pre-op Performing Provider: Marzena Weiss MD  Jul 14, 2025             7/10/2025   Surgical Information   What procedure is being done? Eyelid reduction   Facility or Hospital where procedure/surgery will be performed: [facility] location, [place].   Who is doing the procedure / surgery? Bruno Manley MD   Date of surgery / procedure: August 1   Time of surgery / procedure: 8:00   Where do you plan to recover after surgery? at home with family     Fax number for surgical facility: [phone]    Assessment & Plan     The proposed surgical procedure is considered LOW risk.    Preop general physical exam  For treatment of  Ptosis of both upper eyelids  Proceed with surgery as planned.  He is optimized for the proposed surgical procedure.  - CBC with Platelets & Differential; Future  - CBC with Platelets & Differential      Hyperlipidemia LDL goal <130  Cholesterol has been under good control with current treatment.  He will continue atorvastatin as previous.    Hypertension goal BP (blood pressure) < 140/90  Blood pressure under good control with current hydrochlorothiazide use.  He will hold this on the morning of his surgery and resume later that day.  - COMPREHENSIVE METABOLIC PANEL; Future  - COMPREHENSIVE METABOLIC PANEL    Hypersomnolence  Struggling with alertness and hypersomnolence throughout the day.  Previously was treated with modafinil with good results.  He has been off of that for a few months.  I am restarting this and he will follow-up with his PCP at his previously scheduled appointment in August.  He will not take this on the morning of surgery.  - modafinil (PROVIGIL) 200 MG tablet; Take 1 tablet (200 mg) by mouth daily.    Pain in posterior right upper extremity  Right upper arm pain without  particular injury.  This appears muscular rather than joint related.  Physical therapy referral was given.  He will follow-up with PCP at upcoming visit in August.  - Physical Therapy  Referral; Future    VANESSA on CPAP  Continue CPAP as recommended.  He will bring the CPAP machine with him for his surgery.    Class I obesity with serious comorbidity  Has gotten care over the Internet for obesity and is currently at a dose of tirzepatide 7.5 mg weekly.  He has lost 70 pounds with this treatment.  His dose was to be given today but he did not take that and will not take it until following surgery now.      Risks and Recommendations  The patient has the following additional risks and recommendations for perioperative complications:  Obstructive Sleep Apnea:   Monitor patient perioperatively with regard to his oxygenation and respiratory status.  He will bring his CPAP machine with him on the day of surgery.    Antiplatelet or Anticoagulation Medication Instructions   - aspirin: Discontinue aspirin 7 days prior to procedure to reduce bleeding risk. It should be resumed postoperatively.     Additional Medication Instructions  Take all scheduled medications on the day of surgery EXCEPT for modifications listed below:   - Diuretics (furosemide, hydrochlorothiazide, chlorothalidone): DO NOT TAKE on the day of surgery.   - GLP-1 Injectable (exenitide, liraglutide, semaglutide, dulaglutide, etc.): DO NOT TAKE 7 days before surgery   Modafinil: Do not take on the morning of surgery.      Recommendation  Approval given to proceed with proposed procedure, without further diagnostic evaluation.    Follow-up  Return in about 16 days (around 7/30/2025) for as needed prior to surgery.    Rachel Lynch is a 60 year old, presenting for the following:  Pre-Op Exam          7/14/2025     7:51 AM   Additional Questions   Roomed by Oneil   Accompanied by Self     HPI: 60-year-old with known history of well-controlled  hypertension and hyperlipidemia presents for preoperative examination for eyelid reduction surgery for ptosis of both upper lids.          7/10/2025   Pre-Op Questionnaire   Have you ever had a heart attack or stroke? No   Have you ever had surgery on your heart or blood vessels, such as a stent placement, a coronary artery bypass, or surgery on an artery in your head, neck, heart, or legs? No   Do you have chest pain with activity? No   Do you have a history of heart failure? No   Do you currently have a cold, bronchitis or symptoms of other infection? No   Do you have a cough, shortness of breath, or wheezing? No   Do you or anyone in your family have previous history of blood clots? No   Do you or does anyone in your family have a serious bleeding problem such as prolonged bleeding following surgeries or cuts? No   Have you ever had problems with anemia or been told to take iron pills? No   Have you had any abnormal blood loss such as black, tarry or bloody stools? No   Have you ever had a blood transfusion? No   Are you willing to have a blood transfusion if it is medically needed before, during, or after your surgery? Yes   Have you or any of your relatives ever had problems with anesthesia? No   Do you have sleep apnea, excessive snoring or daytime drowsiness? (!) YES     Do you have a CPAP machine? Yes   Do you have any artifical heart valves or other implanted medical devices like a pacemaker, defibrillator, or continuous glucose monitor? No   Do you have artificial joints? No   Are you allergic to latex? No     Advance Care Planning    Discussed advance care planning with patient; informed AVS has link to [employer].    Preoperative Review of    reviewed - no record of controlled substances prescribed.      Status of Chronic Conditions:  HYPERLIPIDEMIA - Patient has a long history of significant Hyperlipidemia requiring medication for treatment with recent good control. Patient reports no  problems or side effects with the medication.     HYPERTENSION - Patient has longstanding history of HTN , currently denies any symptoms referable to elevated blood pressure. Specifically denies chest pain, palpitations, dyspnea, orthopnea, PND or peripheral edema. Blood pressure readings have been in normal range. Current medication regimen is as listed below. Patient denies any side effects of medication.     HYPERSOMNOLENCE -was previously treated with modafinil with benefit.  He discontinued this when he started to reduce appetite because he thought there was going to be some benefit for the symptoms which was after use.  He is struggling to remain alert and noted even during the course of our visit today.        Patient Active Problem List    Diagnosis Date Noted    Fatty liver 02/23/2024     Priority: Medium    Fatigue, unspecified type 09/30/2022     Priority: Medium    Class 1 obesity with serious comorbidity and body mass index (BMI) of 33.0 to 33.9 in adult, unspecified obesity type 09/30/2022     Priority: Medium    Abnormal stress test 01/24/2020     Priority: Medium    Status post coronary angiogram 01/17/2020     Priority: Medium    Atypical chest pain 01/10/2020     Priority: Medium     Added automatically from request for surgery [id number]      Hypertension goal BP (blood pressure) < 140/90 12/27/2019     Priority: Medium    Needle phobia      Priority: Medium    Psoriasis 09/24/2015     Priority: Medium    Hyperlipidemia LDL goal <130      Priority: Medium    VANESSA on CPAP      Priority: Medium     PSG on 8/12/2010 showed an AHI of 5.3 and RDI of 19. PLM index was 10.9. REM latency was 75.5 minutes and TST was 384.5 mins.       Moderate episode of recurrent major depressive disorder (H) 06/12/2013     Priority: Medium    GERD (gastroesophageal reflux disease) 07/05/2012     Priority: Medium    CARDIOVASCULAR SCREENING; LDL GOAL LESS THAN 160 06/27/2012     Priority: Medium    Colon polyp      Priority:  Medium      Past Medical History:   Diagnosis Date    Colon polyp 2010    Repeat colonoscopy 2015    Depressive disorder Long Term    FH: colon cancer     Hyperlipidemia LDL goal <160     Hypertension     Moderate major depression (H) 6/12/2013    Needle phobia     VANESSA on CPAP     could not tolerate CPAP     Past Surgical History:   Procedure Laterality Date    COLONOSCOPY  2020    CV HEART CATHETERIZATION WITH POSSIBLE INTERVENTION N/A 1/17/2020    Procedure: Coronary Angiogram;  Surgeon: Patricia Gomez MD;  Location:  HEART CARDIAC CATH LAB     Current Outpatient Medications   Medication Sig Dispense Refill    aspirin (ASA) 81 MG chewable tablet Take 81 mg by mouth daily      atorvastatin (LIPITOR) 40 MG tablet Take 1 tablet (40 mg) by mouth daily. 90 tablet 1    buPROPion (WELLBUTRIN XL) 300 MG 24 hr tablet TAKE 1 TABLET EVERY MORNING 90 tablet 1    hydrochlorothiazide (HYDRODIURIL) 25 MG tablet TAKE 1 TABLET DAILY FOR HIGH BLOOD PRESSURE (DUE FOR APPOINTMENT WITH PROVIDER FOR FURTHER REFILLS) 90 tablet 0    modafinil (PROVIGIL) 200 MG tablet Take 1 tablet (200 mg) by mouth daily. 90 tablet 0    Multiple Vitamin (MULTIVITAMIN PO)       sildenafil (VIAGRA) 50 MG tablet Take 1 tablet (50 mg) by mouth daily as needed 6 tablet 11    tamsulosin (FLOMAX) 0.4 MG capsule TAKE 1 CAPSULE DAILY 90 capsule 1    Tirzepatide (MOUNJARO) 7.5 MG/0.5ML SOAJ auto-injector pen Inject 0.5 mLs (7.5 mg) subcutaneously once a week.         Allergies   Allergen Reactions    No Known Drug Allergy         Social History     Tobacco Use    Smoking status: Never     Passive exposure: Never    Smokeless tobacco: Never   Substance Use Topics    Alcohol use: Yes     Alcohol/week: 0.0 - 1.0 standard drinks of alcohol     Comment: 2 drinks per week     Family History   Problem Relation Age of Onset    Gastrointestinal Disease Mother         colon polyps    Depression Mother         Struss    Prostate Cancer Father 78    Other Cancer  "Father         Bladder    Cancer - colorectal Maternal Grandfather 70    C.A.D. Paternal Grandfather         MI age 65    Anesthesia Reaction No family hx of      History   Drug Use No             Review of Systems  Constitutional, HEENT, cardiovascular, pulmonary, GI, , musculoskeletal, neuro, skin, endocrine and psych systems are negative, except as otherwise noted.  Right upper posterior arm pain present for the last 3 to 4 months.  No known injury.  Pain primarily when he is reaching behind himself when laying in bed.  No weakness noted.  Full range of motion as described.    Objective    /69 (BP Location: Right arm, Patient Position: Sitting, Cuff Size: Adult Regular)   Pulse 51   Temp 98  F (36.7  C) (Tympanic)   Resp 16   Ht 1.854 m (6' 1\")   Wt 86.8 kg (191 lb 4.8 oz)   SpO2 98%   BMI 25.24 kg/m     Estimated body mass index is 25.24 kg/m  as calculated from the following:    Height as of this encounter: 1.854 m (6' 1\").    Weight as of this encounter: 86.8 kg (191 lb 4.8 oz).  Physical Exam  GENERAL: alert and no distress  EYES: Eyes grossly normal to inspection, PERRL and conjunctivae and sclerae normal  HENT: ear canals and TM's normal, nose and mouth without ulcers or lesions  NECK: no adenopathy, no asymmetry, masses, or scars  RESP: lungs clear to auscultation - no rales, rhonchi or wheezes  CV: regular rate and rhythm, normal S1 S2, no S3 or S4, no murmur, click or rub, no peripheral edema  ABDOMEN: soft, nontender, no hepatosplenomegaly, no masses and bowel sounds normal  MS: extremities normal- no gross deformities noted.  Tenderness posterior lateral right upper arm.  Full range of motion is noted.  Pain on abduction and external rotation primarily.  SKIN: no suspicious lesions or rashes  NEURO: Normal strength and tone, mentation intact and speech normal  PSYCH: mentation appears normal, affect normal/bright, fatigued, judgement and insight intact, and appearance well groomed    No " "results for input(s): \"HGB\", \"PLT\", \"INR\", \"NA\", \"POTASSIUM\", \"CR\", \"A1C\" in the last 8760 hours.     Diagnostics  Recent Results (from the past 48 hours)   COMPREHENSIVE METABOLIC PANEL    Collection Time: 07/14/25  9:04 AM   Result Value Ref Range    Sodium 141 135 - 145 mmol/L    Potassium 4.3 3.4 - 5.3 mmol/L    Carbon Dioxide (CO2) 27 22 - 29 mmol/L    Anion Gap 9 7 - 15 mmol/L    Urea Nitrogen 14.8 8.0 - 23.0 mg/dL    Creatinine 1.08 0.67 - 1.17 mg/dL    GFR Estimate 78 >60 mL/min/1.73m2    Calcium 9.7 8.8 - 10.4 mg/dL    Chloride 105 98 - 107 mmol/L    Glucose 84 70 - 99 mg/dL    Alkaline Phosphatase 81 40 - 150 U/L    AST 35 0 - 45 U/L    ALT 58 0 - 70 U/L    Protein Total 6.8 6.4 - 8.3 g/dL    Albumin 4.3 3.5 - 5.2 g/dL    Bilirubin Total 0.7 <=1.2 mg/dL   CBC with platelets and differential    Collection Time: 07/14/25  9:04 AM   Result Value Ref Range    WBC Count 5.6 4.0 - 11.0 10e3/uL    RBC Count 4.40 4.40 - 5.90 10e6/uL    Hemoglobin 13.9 13.3 - 17.7 g/dL    Hematocrit 40.5 40.0 - 53.0 %    MCV 92 78 - 100 fL    MCH 31.6 26.5 - 33.0 pg    MCHC 34.3 31.5 - 36.5 g/dL    RDW 12.2 10.0 - 15.0 %    Platelet Count 231 150 - 450 10e3/uL    % Neutrophils 56 %    % Lymphocytes 30 %    % Monocytes 9 %    % Eosinophils 5 %    % Basophils 1 %    % Immature Granulocytes 0 %    Absolute Neutrophils 3.2 1.6 - 8.3 10e3/uL    Absolute Lymphocytes 1.7 0.8 - 5.3 10e3/uL    Absolute Monocytes 0.5 0.0 - 1.3 10e3/uL    Absolute Eosinophils 0.3 0.0 - 0.7 10e3/uL    Absolute Basophils 0.0 0.0 - 0.2 10e3/uL    Absolute Immature Granulocytes 0.0 <=0.4 10e3/uL      No EKG required, no history of coronary heart disease, significant arrhythmia, peripheral arterial disease or other structural heart disease.    Revised Cardiac Risk Index (RCRI)  The patient has the following serious cardiovascular risks for perioperative complications:   - No serious cardiac risks = 0 points     RCRI Interpretation: 0 points: Class I (very low risk " - 0.4% complication rate)         Signed Electronically by: Marzena Weiss MD  A copy of this evaluation report is provided to the requesting physician.             This chart was documented by provider using a voice activated software called Dragon in addition to manual typing. There may be vocabulary errors or other grammatical errors due to this.

## 2025-07-16 ENCOUNTER — TELEPHONE (OUTPATIENT)
Dept: FAMILY MEDICINE | Facility: CLINIC | Age: 60
End: 2025-07-16
Payer: COMMERCIAL

## 2025-07-16 NOTE — TELEPHONE ENCOUNTER
PRIOR AUTHORIZATION DENIED    Medication: MODAFINIL 200 MG PO TABS  Insurance Company: [employer] - Phone [phone] Fax [phone]  Denial Date: 7/15/2025  Denial Reason(s): Pt did not meet criteria          Appeal Information: Request ID# [id number]      Patient Notified: No

## 2025-07-17 ENCOUNTER — MYC MEDICAL ADVICE (OUTPATIENT)
Dept: FAMILY MEDICINE | Facility: CLINIC | Age: 60
End: 2025-07-17

## 2025-07-17 ENCOUNTER — THERAPY VISIT (OUTPATIENT)
Dept: PHYSICAL THERAPY | Facility: CLINIC | Age: 60
End: 2025-07-17
Attending: FAMILY MEDICINE
Payer: COMMERCIAL

## 2025-07-17 DIAGNOSIS — M79.601: ICD-10-CM

## 2025-07-17 ASSESSMENT — ACTIVITIES OF DAILY LIVING (ADL)
AT_ITS_WORST?: 2
WHEN_LYING_ON_THE_INVOLVED_SIDE: 2
PLACING_AN_OBJECT_ON_A_HIGH_SHELF: 1
PUTTING_ON_AN_UNDERSHIRT_OR_A_PULLOVER_SWEATER: 1
TOUCHING_THE_BACK_OF_YOUR_NECK: 1
REMOVING_SOMETHING_FROM_YOUR_BACK_POCKET: 1
WASHING_YOUR_BACK: 2
PUTTING_ON_A_SHIRT_THAT_BUTTONS_DOWN_THE_FRONT: 1
PLEASE_INDICATE_YOR_PRIMARY_REASON_FOR_REFERRAL_TO_THERAPY:: SHOULDER
REACHING_FOR_SOMETHING_ON_A_HIGH_SHELF: 1
PUTTING_ON_YOUR_PANTS: 0
WASHING_YOUR_HAIR?: 1
CARRYING_A_HEAVY_OBJECT_OF_10_POUNDS: 0
PUSHING_WITH_THE_INVOLVED_ARM: 0

## 2025-07-17 NOTE — PROGRESS NOTES
PHYSICAL THERAPY EVALUATION  Type of Visit: Evaluation       Fall Risk Screen:  Have you fallen 2 or more times in the past year?: No  Have you fallen and had an injury in the past year?: No  Is patient receiving Physical Therapy Services?: No    Subjective         Presenting condition or subjective complaint: Patient reports a 4 month insidious onset of R upper arm pain. He c/o pain only at night, when reaching behind him, extending his UE  to turn on his clock radio. The pain quickly goes away and is not present with any other activity or movement.  Date of onset: 07/14/25 (MD order for PT)    Relevant medical history:     Dates & types of surgery:      Prior diagnostic imaging/testing results: Other I wanr to understand issue   find an execise to help   Prior therapy history for the same diagnosis, illness or injury: No      Prior Level of Function  Transfers:   Ambulation:   ADL:   IADL:     Living Environment  Social support: With a significant other or spouse   Type of home: House   Stairs to enter the home: Yes 12 Is there a railing: Yes     Ramp: No   Stairs inside the home: Yes 12 Is there a railing: Yes     Help at home: None  Equipment owned:       Employment: Yes acounting  Hobbies/Interests:      Patient goals for therapy:      Pain assessment: none     Objective   SHOULDER EVALUATION  PAIN: Pain Level at Rest: 0/10  Pain Level with Use: 1/10  intermittent  Sharp pain  Worse with reaching out to side and behind back  No pain if avoids the movement    INTEGUMENTARY (edema, incisions):   POSTURE: Sitting Posture: Rounded shoulders, Forward head, slouched  GAIT:   Weightbearing Status:   Assistive Device(s):   Gait Deviations:   BALANCE/PROPRIOCEPTION:   WEIGHTBEARING ALIGNMENT:   ROM: AROM: FLX R 170 pain free, ABD painful arc , IR/EXT WFL pain free, ER 85 pain free  PROM: B shld all motions WFL pain free    STRENGTH: all R UE strength is WFL pain free except R ABD 5/5 mild discomfort  FLEXIBILITY:    SPECIAL TESTS:   PALPATION: WFL    JOINT MOBILITY: WFL  CERVICAL SCREEN: FLX min loss- NE, EXT mod loss- NE, B rotation min loss -NE, B SB mod loss - NE    Assessment & Plan   CLINICAL IMPRESSIONS  Medical Diagnosis: Pain in posterior right upper extremity    Treatment Diagnosis: Pain in posterior right upper extremity   Impression/Assessment: Patient is a 60 year old male with pain R posterior upper arm complaints.  The following significant findings have been identified: Pain, Decreased ROM/flexibility, and Impaired posture. These impairments interfere with their ability to perform self care tasks and household chores as compared to previous level of function.     Clinical Decision Making (Complexity):  Clinical Presentation: Stable/Uncomplicated  Clinical Presentation Rationale: based on medical and personal factors listed in PT evaluation  Clinical Decision Making (Complexity): Low complexity    PLAN OF CARE  Treatment Interventions:  Interventions: Neuromuscular Re-education, Therapeutic Activity, Therapeutic Exercise    Long Term Goals     PT Goal 1  Goal Identifier: reaching  Goal Description: Pt will reach his clock while lying in bed pain free  Rationale: to maximize safety and independence with self cares;to maximize safety and independence with performance of ADLs and functional tasks  Target Date: 09/11/25  PT Goal 2  Goal Identifier: sitting posture  Goal Description: Pt will sit in neutral posture 75% of the work day  Rationale: to maximize safety and independence with performance of ADLs and functional tasks  Target Date: 09/11/25      Frequency of Treatment: 1x week  Duration of Treatment: 8 weeks    Recommended Referrals to Other Professionals: Physical Therapy  Education Assessment:   Learner/Method: Patient;Listening;Pictures/Video    Risks and benefits of evaluation/treatment have been explained.   Patient/Family/caregiver agrees with Plan of Care.     Evaluation Time:     PT Eval, Low  Complexity Minutes (86488): 17       Signing Clinician: Catrachita Nugent PT

## 2025-07-29 ENCOUNTER — MYC MEDICAL ADVICE (OUTPATIENT)
Dept: FAMILY MEDICINE | Facility: CLINIC | Age: 60
End: 2025-07-29
Payer: COMMERCIAL

## 2025-07-30 NOTE — TELEPHONE ENCOUNTER
Patient is sending in response to [facility] Medical Advice with Jaqui Koch MD (07/17/2025)  Amanda Fitch RN, BSN, PHN

## 2025-09-02 ENCOUNTER — THERAPY VISIT (OUTPATIENT)
Dept: PHYSICAL THERAPY | Facility: CLINIC | Age: 60
End: 2025-09-02
Payer: COMMERCIAL

## 2025-09-02 DIAGNOSIS — M79.601: Primary | ICD-10-CM

## 2025-09-02 PROCEDURE — 97110 THERAPEUTIC EXERCISES: CPT | Mod: GP | Performed by: PHYSICAL THERAPIST

## (undated) DEVICE — TOTE ANGIO CORP PC15AT SAN32CC83O

## (undated) DEVICE — INTRO SHEATH 6FRX10CM PINNACLE RSS602

## (undated) DEVICE — KIT HAND CONTROL ANGIOTOUCH ACIST 65CM AT-P65

## (undated) DEVICE — CATH ANGIO INFINITI 3DRC 6FRX100CM 534676T

## (undated) DEVICE — CATH ANGIO INFINITI PIGTAIL 145 6 SH 6FRX110CM  534-652S

## (undated) DEVICE — CLOSURE ANGIOSEAL 6FR 610130

## (undated) DEVICE — MANIFOLD KIT ANGIO AUTOMATED 014613

## (undated) DEVICE — CATH ANGIO JUDKINS JL4 6FRX100CM INFINITI 534620T

## (undated) DEVICE — DEFIB PRO-PADZ LVP LQD GEL ADULT 8900-2105-01

## (undated) DEVICE — INTRODUCER CATH VASC 5FRX10CM  MPIS-501-NT-U-SST

## (undated) RX ORDER — FENTANYL CITRATE 50 UG/ML
INJECTION, SOLUTION INTRAMUSCULAR; INTRAVENOUS
Status: DISPENSED
Start: 2020-01-17

## (undated) RX ORDER — HEPARIN SODIUM 1000 [USP'U]/ML
INJECTION, SOLUTION INTRAVENOUS; SUBCUTANEOUS
Status: DISPENSED
Start: 2020-01-17

## (undated) RX ORDER — LIDOCAINE HYDROCHLORIDE 10 MG/ML
INJECTION, SOLUTION EPIDURAL; INFILTRATION; INTRACAUDAL; PERINEURAL
Status: DISPENSED
Start: 2020-01-17

## (undated) RX ORDER — POTASSIUM CHLORIDE 1500 MG/1
TABLET, EXTENDED RELEASE ORAL
Status: DISPENSED
Start: 2020-01-17

## (undated) RX ORDER — ONDANSETRON 2 MG/ML
INJECTION INTRAMUSCULAR; INTRAVENOUS
Status: DISPENSED
Start: 2020-01-17

## (undated) RX ORDER — HEPARIN SODIUM 200 [USP'U]/100ML
INJECTION, SOLUTION INTRAVENOUS
Status: DISPENSED
Start: 2020-01-17